# Patient Record
Sex: MALE | Race: WHITE | Employment: FULL TIME | URBAN - METROPOLITAN AREA
[De-identification: names, ages, dates, MRNs, and addresses within clinical notes are randomized per-mention and may not be internally consistent; named-entity substitution may affect disease eponyms.]

---

## 2019-06-12 ENCOUNTER — HOSPITAL ENCOUNTER (INPATIENT)
Age: 64
LOS: 2 days | Discharge: HOME | DRG: 310 | End: 2019-06-14
Attending: EMERGENCY MEDICINE | Admitting: INTERNAL MEDICINE
Payer: COMMERCIAL

## 2019-06-12 ENCOUNTER — APPOINTMENT (OUTPATIENT)
Dept: GENERAL RADIOLOGY | Age: 64
DRG: 310 | End: 2019-06-12
Payer: COMMERCIAL

## 2019-06-12 DIAGNOSIS — I48.91 ATRIAL FIBRILLATION WITH RAPID VENTRICULAR RESPONSE (HCC): Primary | ICD-10-CM

## 2019-06-12 DIAGNOSIS — I10 HYPERTENSION, UNSPECIFIED TYPE: ICD-10-CM

## 2019-06-12 LAB
A/G RATIO: 1.3 (ref 1.1–2.2)
ALBUMIN SERPL-MCNC: 4.1 G/DL (ref 3.4–5)
ALP BLD-CCNC: 66 U/L (ref 40–129)
ALT SERPL-CCNC: 11 U/L (ref 10–40)
ANION GAP SERPL CALCULATED.3IONS-SCNC: 14 MMOL/L (ref 3–16)
APTT: 30.3 SEC (ref 26–36)
AST SERPL-CCNC: 13 U/L (ref 15–37)
BASOPHILS ABSOLUTE: 0.1 K/UL (ref 0–0.2)
BASOPHILS RELATIVE PERCENT: 0.8 %
BILIRUB SERPL-MCNC: 0.7 MG/DL (ref 0–1)
BILIRUBIN URINE: NEGATIVE
BLOOD, URINE: NEGATIVE
BUN BLDV-MCNC: 11 MG/DL (ref 7–20)
CALCIUM SERPL-MCNC: 9.4 MG/DL (ref 8.3–10.6)
CHLORIDE BLD-SCNC: 102 MMOL/L (ref 99–110)
CLARITY: CLEAR
CO2: 23 MMOL/L (ref 21–32)
COLOR: ABNORMAL
CREAT SERPL-MCNC: 0.9 MG/DL (ref 0.8–1.3)
EOSINOPHILS ABSOLUTE: 0.2 K/UL (ref 0–0.6)
EOSINOPHILS RELATIVE PERCENT: 1.5 %
EPITHELIAL CELLS, UA: 0 /HPF (ref 0–5)
GFR AFRICAN AMERICAN: >60
GFR NON-AFRICAN AMERICAN: >60
GLOBULIN: 3.1 G/DL
GLUCOSE BLD-MCNC: 104 MG/DL (ref 70–99)
GLUCOSE URINE: NEGATIVE MG/DL
HCT VFR BLD CALC: 53.5 % (ref 40.5–52.5)
HEMOGLOBIN: 17.8 G/DL (ref 13.5–17.5)
HYALINE CASTS: 1 /LPF (ref 0–8)
INR BLD: 0.94 (ref 0.86–1.14)
KETONES, URINE: NEGATIVE MG/DL
LEUKOCYTE ESTERASE, URINE: NEGATIVE
LYMPHOCYTES ABSOLUTE: 2.3 K/UL (ref 1–5.1)
LYMPHOCYTES RELATIVE PERCENT: 20.9 %
MAGNESIUM: 1.6 MG/DL (ref 1.8–2.4)
MCH RBC QN AUTO: 33.1 PG (ref 26–34)
MCHC RBC AUTO-ENTMCNC: 33.3 G/DL (ref 31–36)
MCV RBC AUTO: 99.3 FL (ref 80–100)
MICROSCOPIC EXAMINATION: YES
MONOCYTES ABSOLUTE: 0.8 K/UL (ref 0–1.3)
MONOCYTES RELATIVE PERCENT: 7.2 %
NEUTROPHILS ABSOLUTE: 7.6 K/UL (ref 1.7–7.7)
NEUTROPHILS RELATIVE PERCENT: 69.6 %
NITRITE, URINE: NEGATIVE
PDW BLD-RTO: 14.3 % (ref 12.4–15.4)
PH UA: 6 (ref 5–8)
PLATELET # BLD: 253 K/UL (ref 135–450)
PMV BLD AUTO: 10.3 FL (ref 5–10.5)
POTASSIUM REFLEX MAGNESIUM: 3.4 MMOL/L (ref 3.5–5.1)
POTASSIUM SERPL-SCNC: 3.4 MMOL/L (ref 3.5–5.1)
PRO-BNP: 1772 PG/ML (ref 0–124)
PROTEIN UA: >=300 MG/DL
PROTHROMBIN TIME: 10.7 SEC (ref 9.8–13)
RBC # BLD: 5.39 M/UL (ref 4.2–5.9)
RBC UA: 2 /HPF (ref 0–4)
REJECTED TEST: NORMAL
SODIUM BLD-SCNC: 139 MMOL/L (ref 136–145)
SPECIFIC GRAVITY UA: 1.02 (ref 1–1.03)
TOTAL PROTEIN: 7.2 G/DL (ref 6.4–8.2)
TROPONIN: <0.01 NG/ML
URINE REFLEX TO CULTURE: ABNORMAL
URINE TYPE: ABNORMAL
UROBILINOGEN, URINE: 1 E.U./DL
WBC # BLD: 10.9 K/UL (ref 4–11)
WBC UA: 1 /HPF (ref 0–5)

## 2019-06-12 PROCEDURE — 71045 X-RAY EXAM CHEST 1 VIEW: CPT

## 2019-06-12 PROCEDURE — 36415 COLL VENOUS BLD VENIPUNCTURE: CPT

## 2019-06-12 PROCEDURE — 85610 PROTHROMBIN TIME: CPT

## 2019-06-12 PROCEDURE — 85025 COMPLETE CBC W/AUTO DIFF WBC: CPT

## 2019-06-12 PROCEDURE — 96376 TX/PRO/DX INJ SAME DRUG ADON: CPT

## 2019-06-12 PROCEDURE — 96366 THER/PROPH/DIAG IV INF ADDON: CPT

## 2019-06-12 PROCEDURE — 6370000000 HC RX 637 (ALT 250 FOR IP): Performed by: INTERNAL MEDICINE

## 2019-06-12 PROCEDURE — 81001 URINALYSIS AUTO W/SCOPE: CPT

## 2019-06-12 PROCEDURE — 94760 N-INVAS EAR/PLS OXIMETRY 1: CPT

## 2019-06-12 PROCEDURE — 83880 ASSAY OF NATRIURETIC PEPTIDE: CPT

## 2019-06-12 PROCEDURE — 2580000003 HC RX 258: Performed by: INTERNAL MEDICINE

## 2019-06-12 PROCEDURE — 99285 EMERGENCY DEPT VISIT HI MDM: CPT

## 2019-06-12 PROCEDURE — 83735 ASSAY OF MAGNESIUM: CPT

## 2019-06-12 PROCEDURE — 93005 ELECTROCARDIOGRAM TRACING: CPT | Performed by: EMERGENCY MEDICINE

## 2019-06-12 PROCEDURE — 96365 THER/PROPH/DIAG IV INF INIT: CPT

## 2019-06-12 PROCEDURE — 2060000000 HC ICU INTERMEDIATE R&B

## 2019-06-12 PROCEDURE — 2580000003 HC RX 258: Performed by: EMERGENCY MEDICINE

## 2019-06-12 PROCEDURE — 84484 ASSAY OF TROPONIN QUANT: CPT

## 2019-06-12 PROCEDURE — 2500000003 HC RX 250 WO HCPCS: Performed by: EMERGENCY MEDICINE

## 2019-06-12 PROCEDURE — 80053 COMPREHEN METABOLIC PANEL: CPT

## 2019-06-12 PROCEDURE — 94150 VITAL CAPACITY TEST: CPT

## 2019-06-12 PROCEDURE — 85730 THROMBOPLASTIN TIME PARTIAL: CPT

## 2019-06-12 PROCEDURE — 6370000000 HC RX 637 (ALT 250 FOR IP): Performed by: EMERGENCY MEDICINE

## 2019-06-12 RX ORDER — HYDROCODONE BITARTRATE AND ACETAMINOPHEN 5; 325 MG/1; MG/1
1 TABLET ORAL EVERY 6 HOURS PRN
Status: DISCONTINUED | OUTPATIENT
Start: 2019-06-12 | End: 2019-06-14 | Stop reason: HOSPADM

## 2019-06-12 RX ORDER — ONDANSETRON 2 MG/ML
4 INJECTION INTRAMUSCULAR; INTRAVENOUS EVERY 6 HOURS PRN
Status: DISCONTINUED | OUTPATIENT
Start: 2019-06-12 | End: 2019-06-14 | Stop reason: HOSPADM

## 2019-06-12 RX ORDER — HYDROCODONE BITARTRATE AND ACETAMINOPHEN 5; 325 MG/1; MG/1
1 TABLET ORAL ONCE
Status: COMPLETED | OUTPATIENT
Start: 2019-06-12 | End: 2019-06-12

## 2019-06-12 RX ORDER — PANTOPRAZOLE SODIUM 40 MG/1
40 TABLET, DELAYED RELEASE ORAL
Status: DISCONTINUED | OUTPATIENT
Start: 2019-06-13 | End: 2019-06-14 | Stop reason: HOSPADM

## 2019-06-12 RX ORDER — DILTIAZEM HYDROCHLORIDE 5 MG/ML
10 INJECTION INTRAVENOUS ONCE
Status: COMPLETED | OUTPATIENT
Start: 2019-06-12 | End: 2019-06-12

## 2019-06-12 RX ORDER — ATENOLOL 50 MG/1
50 TABLET ORAL DAILY
Status: DISCONTINUED | OUTPATIENT
Start: 2019-06-12 | End: 2019-06-14

## 2019-06-12 RX ORDER — SODIUM CHLORIDE 0.9 % (FLUSH) 0.9 %
10 SYRINGE (ML) INJECTION EVERY 12 HOURS SCHEDULED
Status: DISCONTINUED | OUTPATIENT
Start: 2019-06-12 | End: 2019-06-14 | Stop reason: HOSPADM

## 2019-06-12 RX ORDER — HYDROCHLOROTHIAZIDE 25 MG/1
25 TABLET ORAL DAILY
Status: DISCONTINUED | OUTPATIENT
Start: 2019-06-12 | End: 2019-06-14 | Stop reason: HOSPADM

## 2019-06-12 RX ORDER — LISINOPRIL 20 MG/1
20 TABLET ORAL ONCE
Status: COMPLETED | OUTPATIENT
Start: 2019-06-12 | End: 2019-06-12

## 2019-06-12 RX ORDER — SODIUM CHLORIDE 0.9 % (FLUSH) 0.9 %
10 SYRINGE (ML) INJECTION PRN
Status: DISCONTINUED | OUTPATIENT
Start: 2019-06-12 | End: 2019-06-14 | Stop reason: HOSPADM

## 2019-06-12 RX ORDER — FLUTICASONE PROPIONATE 50 MCG
1 SPRAY, SUSPENSION (ML) NASAL DAILY PRN
Status: DISCONTINUED | OUTPATIENT
Start: 2019-06-12 | End: 2019-06-14 | Stop reason: HOSPADM

## 2019-06-12 RX ADMIN — APIXABAN 5 MG: 5 TABLET, FILM COATED ORAL at 18:25

## 2019-06-12 RX ADMIN — Medication 10 ML: at 20:27

## 2019-06-12 RX ADMIN — ATENOLOL 50 MG: 50 TABLET ORAL at 18:21

## 2019-06-12 RX ADMIN — HYDROCODONE BITARTRATE AND ACETAMINOPHEN 1 TABLET: 5; 325 TABLET ORAL at 13:11

## 2019-06-12 RX ADMIN — DILTIAZEM HYDROCHLORIDE 5 MG/HR: 5 INJECTION INTRAVENOUS at 12:39

## 2019-06-12 RX ADMIN — MOMETASONE FUROATE AND FORMOTEROL FUMARATE DIHYDRATE 2 PUFF: 100; 5 AEROSOL RESPIRATORY (INHALATION) at 21:25

## 2019-06-12 RX ADMIN — HYDROCHLOROTHIAZIDE 25 MG: 25 TABLET ORAL at 18:20

## 2019-06-12 RX ADMIN — DILTIAZEM HYDROCHLORIDE 10 MG: 5 INJECTION INTRAVENOUS at 12:23

## 2019-06-12 RX ADMIN — HYDROCODONE BITARTRATE AND ACETAMINOPHEN 1 TABLET: 5; 325 TABLET ORAL at 18:22

## 2019-06-12 RX ADMIN — LISINOPRIL 20 MG: 20 TABLET ORAL at 21:21

## 2019-06-12 ASSESSMENT — PAIN SCALES - GENERAL
PAINLEVEL_OUTOF10: 7
PAINLEVEL_OUTOF10: 2
PAINLEVEL_OUTOF10: 7
PAINLEVEL_OUTOF10: 3
PAINLEVEL_OUTOF10: 4
PAINLEVEL_OUTOF10: 4
PAINLEVEL_OUTOF10: 3

## 2019-06-12 ASSESSMENT — PAIN DESCRIPTION - ONSET: ONSET: ON-GOING

## 2019-06-12 ASSESSMENT — PAIN DESCRIPTION - FREQUENCY: FREQUENCY: INTERMITTENT

## 2019-06-12 ASSESSMENT — PAIN DESCRIPTION - DESCRIPTORS: DESCRIPTORS: ACHING

## 2019-06-12 ASSESSMENT — PAIN - FUNCTIONAL ASSESSMENT: PAIN_FUNCTIONAL_ASSESSMENT: PREVENTS OR INTERFERES SOME ACTIVE ACTIVITIES AND ADLS

## 2019-06-12 ASSESSMENT — PAIN DESCRIPTION - PROGRESSION: CLINICAL_PROGRESSION: NOT CHANGED

## 2019-06-12 ASSESSMENT — PAIN DESCRIPTION - ORIENTATION: ORIENTATION: ANTERIOR

## 2019-06-12 ASSESSMENT — PAIN DESCRIPTION - LOCATION: LOCATION: HEAD

## 2019-06-12 ASSESSMENT — PAIN DESCRIPTION - PAIN TYPE: TYPE: ACUTE PAIN

## 2019-06-12 NOTE — ED NOTES
Return phone call received from cardiology. Order obtained per Dr. Gerardo Mauricio to stop Cardiazem gtt and stated that someone will be in to see pt tomorrow.       Kimber Titus RN  06/12/19 1660

## 2019-06-12 NOTE — ED TRIAGE NOTES
Pt sent to ED by his PCP Dr. Cristina Muro d/t high b/p, weakness, lethargy and dizziness that started Friday. Pt states he went to PCP today to get lab work done today, states Dr. Cristina Muro wanted to r/o leukemia, and Dr. Cristina Muro sent him to the ED d/t his b/p. Pt states Dr. Cristina Muro gave him b/p medication while in her office but does not know what the name of the medication was.

## 2019-06-12 NOTE — ED PROVIDER NOTES
[facility]  eMERGENCY dEPARTMENT eNCOUnter      Pt Name: Miya Sanabria  MRN: [id number]  [place] 1955  Date of evaluation: 6/12/2019  Provider: Demetri Rae DO    CHIEF COMPLAINT       Chief Complaint   Patient presents with    Hypertension     Pt sent to ED by his PCP Dr. Sumi Ko d/t high b/p, weakness, lethargy and dizziness that started Friday.  Fatigue         HISTORY OF PRESENT ILLNESS   (Location/Symptom, Timing/Onset, Context/Setting, Quality, Duration, Modifying Factors, Severity)  Note limiting factors. Miya Sanabria is a 62 y.o. male who presents to the emergency department with complaint of fatigue, lightheadedness for the last 2 to 3 days. He went to see his primary care physician Dr. Sumi Ko today in the office and was noted to have elevated blood pressure and was sent to the hospital to be admitted. He reports that his blood tests in the past have shown an elevated white blood cell count of 18 and he was to have this checked today. He is known to have a history of hypertension. He reports that his medication was changed to a generic medication approximately 6 months ago but he has not been taking his medication. He denies any chest pain heaviness pressure tightness. He denies any syncope. He denies any palpitations. He does have some mild shortness of breath which is consistent with his COPD but is unchanged from baseline. He denies any dyspnea on exertion. He denies any focal weakness or numbness. He denies any vomiting or diarrhea. No melena or hematochezia. No abdominal pain. No headache. HPI    Nursing Notes were reviewed. REVIEW OF SYSTEMS    (2-9 systems for level 4, 10 or more for level 5)       Constitutional: Negative for fever or chills. HENT: Negative for rhinorrhea and sore throat. Eyes: Negative for redness or drainage. Respiratory: Negative for shortness of breath or dyspnea on exertion. (HYDRODIURIL) 25 MG TABLET    Take 1 tablet by mouth daily    HYDROCODONE-ACETAMINOPHEN (NORCO) 5-325 MG PER TABLET    Take 1 tablet by mouth every 8 hours as needed for Pain for up to 30 days. LOSARTAN (COZAAR) 100 MG TABLET    Take 1 tablet by mouth daily    OMEPRAZOLE (PRILOSEC) 40 MG DELAYED RELEASE CAPSULE    TAKE 1 CAPSULE BY MOUTH  DAILY       ALLERGIES     Lodine [etodolac]; Neurontin [gabapentin]; Pregabalin; Vioxx; and Wellbutrin [bupropion hcl]    FAMILY HISTORY       Family History   Problem Relation Age of Onset    Arthritis Father     Other Father         GERD    Heart Disease Father         had heart attack          SOCIAL HISTORY       Social History     Socioeconomic History    Marital status:      Spouse name: Viviane Montague Number of children: 2    Years of education: None    Highest education level: None   Occupational History    Occupation: Maintenance     Employer: [employer]   Social Needs    Financial resource strain: None    Food insecurity:     Worry: None     Inability: None    Transportation needs:     Medical: None     Non-medical: None   Tobacco Use    Smoking status: Current Every Day Smoker     Packs/day: 2.00     Years: 50.00     Pack years: 100.00     Types: Cigarettes    Smokeless tobacco: Never Used    Tobacco comment: pt has tried to quit in the past to quit with use of chantix and didn't work. has tried other means of quitting and has been unsuccsessful.    Substance and Sexual Activity    Alcohol use: No     Comment: no longer drinks    Drug use: Yes     Types: Marijuana     Comment: daily use, \"like a cocktail\"    Sexual activity: Yes     Partners: Female   Lifestyle    Physical activity:     Days per week: None     Minutes per session: None    Stress: None   Relationships    Social connections:     Talks on phone: None     Gets together: None     Attends Jewish service: None     Active member of club or organization: None     Attends meetings of clubs or organizations: None     Relationship status: None    Intimate partner violence:     Fear of current or ex partner: None     Emotionally abused: None     Physically abused: None     Forced sexual activity: None   Other Topics Concern    None   Social History Narrative    None       SCREENINGS             PHYSICAL EXAM    (up to 7 for level 4, 8 or more for level 5)     ED Triage Vitals [06/12/19 1106]   BP Temp Temp Source Pulse Resp SpO2 Height Weight   (!) 163/103 97.9 °F (36.6 °C) Oral 62 17 98 % 5' 8\" (1.727 m) 183 lb 6.8 oz (83.2 kg)       Physical Exam   Constitutional: Awake and alert and oriented to person place and time. No apparent distress. Head: No visible evidence of trauma. Normocephalic. Eyes: Pupils equal and reactive. No photophobia. Conjunctiva normal.    HENT: Oral mucosa moist.  Airway patent. Neck:  Soft and supple. Heart: Rapid and irregular. No murmur. Occasional PVCs on the monitor. Atrial fibrillation on the monitor. Lungs:  Clear to auscultation. No wheezes, rales, or ronchi. No conversational dyspnea or accessory muscle use. Chest: Chest wall non-tender. No evidence of trauma. Abdomen:  Soft, nondistended, bowel sounds present. Nontender. No guarding rigidity or rebound. No masses. Musculoskeletal: Extremities non-tender with full range of motion. Radial and dorsalis pedis pulses are equal bilaterally. No calf tenderness erythema or edema. Neurological: Alert and oriented x 3. Speech clear. Cranial nerves II-XII intact. No facial droop. No acute focal motor or sensory deficits. Skin: Skin is warm and dry. No rash. Lymphatic:  No lympadenopathy. Psychiatric: Normal mood and affect. Behavior is normal.         DIAGNOSTIC RESULTS     EKG: All EKG's are interpreted by the Emergency Department Physician who either signs or Co-signs this chart in the absence of a cardiologist.    Atrial fibrillation with rapid ventricular response.   Rate 122.  QRS duration 98 ms. QTc 513 ms. R axis -30 7 degrees. There is no ST elevation. Occasional PVCs were noted. RADIOLOGY:   Non-plain film images such as CT, Ultrasound and MRI are read by the radiologist. Plain radiographic images are visualized and preliminarily interpreted by the emergency physician with the below findings:        Interpretation per the Radiologist below, if available at the time of this note:    XR CHEST PORTABLE   Final Result   Borderline cardiomegaly. No evidence for acute cardiopulmonary pathology.                ED BEDSIDE ULTRASOUND:   Performed by ED Physician - none    LABS:  Labs Reviewed   COMPREHENSIVE METABOLIC PANEL W/ REFLEX TO MG FOR LOW K - Abnormal; Notable for the following components:       Result Value    Glucose 104 (*)     AST 13 (*)     All other components within normal limits    Narrative:     Performed at:  [address] [place] [address]   Phone [phone]   URINE RT REFLEX TO CULTURE - Abnormal; Notable for the following components:    Protein, UA >=300 (*)     All other components within normal limits    Narrative:     Performed at:  [address] [place] [address]   Phone [phone]   BRAIN NATRIURETIC PEPTIDE - Abnormal; Notable for the following components:    Pro-BNP 1,772 (*)     All other components within normal limits    Narrative:     Performed at:  [address] [place] [address]   Phone [phone]   CBC WITH AUTO DIFFERENTIAL - Abnormal; Notable for the following components:    Hemoglobin 17.8 (*)     Hematocrit 53.5 (*)     All other components within normal limits    Narrative:     Performed at:  [address] [place] [address]   Phone [phone]   BASIC METABOLIC PANEL - Abnormal; Notable for the following components:    Potassium 3.4 (*)     All other components within normal limits    Narrative:     Performed at:  [facility]  [address] [facility] [place] [address]   Phone [phone]   TROPONIN    Narrative:     Performed at:  [facility]  [address] [facility] [place] [address]   Phone [phone]   PROTIME-INR    Narrative:     Performed at:  [facility]  [address] [facility] [place] [address]   Phone [phone]   APTT    Narrative:     Performed at:  [facility]  [address] [facility] [place] [address]   Phone [phone]   MICROSCOPIC URINALYSIS    Narrative:     Performed at:  [facility]  [address] [facility] [place] [address]   Phone [phone]   SPECIMEN REJECTION    Narrative:     Performed at:  [facility]  [address] [facility] [place] [address]   Phone [phone]       All other labs were within normal range or not returned as of this dictation. EMERGENCY DEPARTMENT COURSE and DIFFERENTIAL DIAGNOSIS/MDM:   Vitals:    Vitals:    06/12/19 1326 06/12/19 1347 06/12/19 1402 06/12/19 1417   BP:  (!) 155/97 (!) 129/95 (!) 147/93   Pulse: 81 68 74 80   Resp: 16 16 13 15   Temp:       TempSrc:       SpO2: 95% 98% 94% 96%   Weight:       Height:           Patient presented with fatigue, lethargy, no energy, and increased blood pressure noted at the physician's office. Patient is noted to have atrial fibrillation with rapid ventricular response. Heart rate is 122 on the monitor. It does appear that there occasionally some periods where he converts briefly to sinus rhythm but returns to a rapid atrial fib. There is also some occasional ectopy with PVCs. No associated chest pain. No evidence of hypoxia.   He was given a bolus of Cardizem 10 mg IV followed by Cardizem drip at 10 mg/h.  We will titrate to a decreased heart rate of less than 184 and systolic blood pressure of less than 150. MDM      REASSESSMENT          The patient was reexamined at the time of disposition. Heart rate is much better. Rate is in the 90s. He still has atrial fibrillation with controlled ventricular response. He has had some intermittent periods where it appears that he is trying to convert to sinus rhythm but overall is still in atrial fib. Blood pressure is much better controlled. He will be admitted for further treatment and evaluation. A call was placed to Dr. Sheba Morrell, his primary care physician. She agrees to admit the patient and is in agreement with the treatment plan. CRITICAL CARE TIME   Total Critical Care time was 30 minutes, excluding separately reportable procedures. There was a high probability of clinically significant/life threatening deterioration in the patient's condition which required my urgent intervention. CONSULTS:  IP CONSULT TO PRIMARY CARE PROVIDER    PROCEDURES:  Unless otherwise noted below, none     Procedures        FINAL IMPRESSION      1. Atrial fibrillation with rapid ventricular response ([address]    2. Hypertension, unspecified type          DISPOSITION/PLAN   DISPOSITION        PATIENT REFERRED TO:  No follow-up provider specified. DISCHARGE MEDICATIONS:  New Prescriptions    No medications on file     Controlled Substances Monitoring:     RX Monitoring 5/22/2019   Attestation The Prescription Monitoring Report for this patient was reviewed today. Periodic Controlled Substance Monitoring Possible medication side effects, risk of tolerance/dependence & alternative treatments discussed. Chronic Pain > 80 MEDD -       (Please note that portions of this note were completed with a voice recognition program.  Efforts were made to edit the dictations but occasionally words are mis-transcribed. )    Nelsy Chin DO (electronically signed)  Attending Emergency Physician          Rufino Duff DO  06/12/19 8289

## 2019-06-12 NOTE — ED NOTES
Pharmacy Medication Reconciliation Note     List of medications patient is currently taking is complete. Allergies:  Lodine [etodolac]; Neurontin [gabapentin]; Pregabalin; Vioxx; and Wellbutrin [bupropion hcl]    Source of information:   1. EMR  2. patient    Notes regarding home medications:   1. Reports he took his AM medications today  2. States one of his BP medications was recalled, but he states he just stopped taking \"all of them. \"  3. Currently has a Trelegy sample inhaler he is using in place of Symbicort.      Denies taking any other OTC or herbal medications    Regan Montoya PharmD, BCPS  6/12/2019  4:24 PM

## 2019-06-12 NOTE — ED NOTES
ED SBAR report provider to [facility]. Patient to be transported to Room 5120 via stretcher by transport tech. Patient transported with bedside cardiac monitor and with IV medications infusing. IV site clean, dry, and intact. MEWS score and pain assessed and documented. Updated patient on plan of care.      Emilie Medina RN  06/12/19 9949

## 2019-06-12 NOTE — ED NOTES
Pt requesting pain medication for his chronic back pain. States he takes Norco 5/325mg at home prn. Dr. Yon Higgins made aware, new orders placed.       Og Evans RN  06/12/19 6627

## 2019-06-13 ENCOUNTER — APPOINTMENT (OUTPATIENT)
Dept: CT IMAGING | Age: 64
DRG: 310 | End: 2019-06-13
Payer: COMMERCIAL

## 2019-06-13 LAB
BASOPHILS ABSOLUTE: 0.1 K/UL (ref 0–0.2)
BASOPHILS RELATIVE PERCENT: 0.9 %
EKG ATRIAL RATE: 53 BPM
EKG DIAGNOSIS: NORMAL
EKG DIAGNOSIS: NORMAL
EKG P AXIS: 34 DEGREES
EKG P-R INTERVAL: 134 MS
EKG Q-T INTERVAL: 360 MS
EKG Q-T INTERVAL: 444 MS
EKG QRS DURATION: 84 MS
EKG QRS DURATION: 98 MS
EKG QTC CALCULATION (BAZETT): 416 MS
EKG QTC CALCULATION (BAZETT): 513 MS
EKG R AXIS: -25 DEGREES
EKG R AXIS: -37 DEGREES
EKG T AXIS: 19 DEGREES
EKG T AXIS: 5 DEGREES
EKG VENTRICULAR RATE: 122 BPM
EKG VENTRICULAR RATE: 53 BPM
EOSINOPHILS ABSOLUTE: 0.2 K/UL (ref 0–0.6)
EOSINOPHILS RELATIVE PERCENT: 2.1 %
HCT VFR BLD CALC: 52.8 % (ref 40.5–52.5)
HEMOGLOBIN: 18 G/DL (ref 13.5–17.5)
LYMPHOCYTES ABSOLUTE: 2.3 K/UL (ref 1–5.1)
LYMPHOCYTES RELATIVE PERCENT: 21.3 %
MCH RBC QN AUTO: 34 PG (ref 26–34)
MCHC RBC AUTO-ENTMCNC: 34.1 G/DL (ref 31–36)
MCV RBC AUTO: 99.6 FL (ref 80–100)
MONOCYTES ABSOLUTE: 0.8 K/UL (ref 0–1.3)
MONOCYTES RELATIVE PERCENT: 7.2 %
NEUTROPHILS ABSOLUTE: 7.5 K/UL (ref 1.7–7.7)
NEUTROPHILS RELATIVE PERCENT: 68.5 %
PDW BLD-RTO: 13.9 % (ref 12.4–15.4)
PLATELET # BLD: 240 K/UL (ref 135–450)
PMV BLD AUTO: 10.3 FL (ref 5–10.5)
RBC # BLD: 5.31 M/UL (ref 4.2–5.9)
WBC # BLD: 10.9 K/UL (ref 4–11)

## 2019-06-13 PROCEDURE — 2580000003 HC RX 258: Performed by: INTERNAL MEDICINE

## 2019-06-13 PROCEDURE — 94640 AIRWAY INHALATION TREATMENT: CPT

## 2019-06-13 PROCEDURE — 2060000000 HC ICU INTERMEDIATE R&B

## 2019-06-13 PROCEDURE — 94761 N-INVAS EAR/PLS OXIMETRY MLT: CPT

## 2019-06-13 PROCEDURE — 6370000000 HC RX 637 (ALT 250 FOR IP): Performed by: INTERNAL MEDICINE

## 2019-06-13 PROCEDURE — 85025 COMPLETE CBC W/AUTO DIFF WBC: CPT

## 2019-06-13 PROCEDURE — 99223 1ST HOSP IP/OBS HIGH 75: CPT | Performed by: INTERNAL MEDICINE

## 2019-06-13 PROCEDURE — 36415 COLL VENOUS BLD VENIPUNCTURE: CPT

## 2019-06-13 PROCEDURE — 93010 ELECTROCARDIOGRAM REPORT: CPT | Performed by: INTERNAL MEDICINE

## 2019-06-13 PROCEDURE — 71250 CT THORAX DX C-: CPT

## 2019-06-13 RX ORDER — NICOTINE 21 MG/24HR
1 PATCH, TRANSDERMAL 24 HOURS TRANSDERMAL DAILY
Status: DISCONTINUED | OUTPATIENT
Start: 2019-06-13 | End: 2019-06-14 | Stop reason: HOSPADM

## 2019-06-13 RX ADMIN — Medication 10 ML: at 21:43

## 2019-06-13 RX ADMIN — APIXABAN 5 MG: 5 TABLET, FILM COATED ORAL at 08:17

## 2019-06-13 RX ADMIN — MOMETASONE FUROATE AND FORMOTEROL FUMARATE DIHYDRATE 2 PUFF: 100; 5 AEROSOL RESPIRATORY (INHALATION) at 21:28

## 2019-06-13 RX ADMIN — HYDROCODONE BITARTRATE AND ACETAMINOPHEN 1 TABLET: 5; 325 TABLET ORAL at 10:45

## 2019-06-13 RX ADMIN — HYDROCHLOROTHIAZIDE 25 MG: 25 TABLET ORAL at 08:17

## 2019-06-13 RX ADMIN — ATENOLOL 50 MG: 50 TABLET ORAL at 08:18

## 2019-06-13 RX ADMIN — PANTOPRAZOLE SODIUM 40 MG: 40 TABLET, DELAYED RELEASE ORAL at 07:44

## 2019-06-13 RX ADMIN — Medication 10 ML: at 08:18

## 2019-06-13 RX ADMIN — MOMETASONE FUROATE AND FORMOTEROL FUMARATE DIHYDRATE 2 PUFF: 100; 5 AEROSOL RESPIRATORY (INHALATION) at 08:36

## 2019-06-13 RX ADMIN — APIXABAN 5 MG: 5 TABLET, FILM COATED ORAL at 21:43

## 2019-06-13 ASSESSMENT — PAIN SCALES - WONG BAKER
WONGBAKER_NUMERICALRESPONSE: 0
WONGBAKER_NUMERICALRESPONSE: 0

## 2019-06-13 ASSESSMENT — PAIN - FUNCTIONAL ASSESSMENT: PAIN_FUNCTIONAL_ASSESSMENT: ACTIVITIES ARE NOT PREVENTED

## 2019-06-13 ASSESSMENT — PAIN DESCRIPTION - LOCATION: LOCATION: BACK

## 2019-06-13 ASSESSMENT — PAIN SCALES - GENERAL
PAINLEVEL_OUTOF10: 0
PAINLEVEL_OUTOF10: 0
PAINLEVEL_OUTOF10: 7

## 2019-06-13 ASSESSMENT — PAIN DESCRIPTION - PAIN TYPE: TYPE: CHRONIC PAIN

## 2019-06-13 ASSESSMENT — PAIN DESCRIPTION - DESCRIPTORS: DESCRIPTORS: ACHING

## 2019-06-13 ASSESSMENT — PAIN DESCRIPTION - PROGRESSION: CLINICAL_PROGRESSION: GRADUALLY WORSENING

## 2019-06-13 ASSESSMENT — PAIN DESCRIPTION - FREQUENCY: FREQUENCY: INTERMITTENT

## 2019-06-13 ASSESSMENT — PAIN DESCRIPTION - ONSET: ONSET: GRADUAL

## 2019-06-13 ASSESSMENT — PAIN DESCRIPTION - ORIENTATION: ORIENTATION: MID;LOWER

## 2019-06-13 NOTE — PLAN OF CARE
Problem: Pain:  Goal: Pain level will decrease  Description  Pain level will decrease  Outcome: Ongoing  Goal: Control of acute pain  Description  Control of acute pain  Outcome: Ongoing  Goal: Control of chronic pain  Description  Control of chronic pain  Outcome: Ongoing     Problem:  Activity:  Goal: Ability to tolerate increased activity will improve  Description  Ability to tolerate increased activity will improve  Outcome: Ongoing  Goal: Expression of feelings of increased energy will increase  Description  Expression of feelings of increased energy will increase  Outcome: Ongoing     Problem: Cardiac:  Goal: Ability to maintain an adequate cardiac output will improve  Description  Ability to maintain an adequate cardiac output will improve  Outcome: Ongoing  Goal: Complications related to the disease process, condition or treatment will be avoided or minimized  Description  Complications related to the disease process, condition or treatment will be avoided or minimized  Outcome: Ongoing  Goal: Ability to maintain vital signs within normal range will improve  Description  Ability to maintain vital signs within normal range will improve  Outcome: Ongoing  Goal: Cardiovascular alteration will improve  Description  Cardiovascular alteration will improve  Outcome: Ongoing     Problem: Coping:  Goal: Level of anxiety will decrease  Description  Level of anxiety will decrease  Outcome: Ongoing  Goal: General experience of comfort will improve  Description  General experience of comfort will improve  Outcome: Ongoing     Problem: Health Behavior:  Goal: Ability to manage health-related needs will improve  Description  Ability to manage health-related needs will improve  Outcome: Ongoing  Goal: Will modify at least one risk factor affecting health status  Description  Will modify at least one risk factor affecting health status  Outcome: Ongoing  Goal: Identification of resources available to assist in meeting health care needs will improve  Description  Identification of resources available to assist in meeting health care needs will improve  Outcome: Ongoing     Problem: Safety:  Goal: Ability to remain free from injury will improve  Description  Ability to remain free from injury will improve  Outcome: Ongoing  Goal: Will show no signs and symptoms of excessive bleeding  Description  Will show no signs and symptoms of excessive bleeding  Outcome: Ongoing     Problem: Physical Regulation:  Goal: Complications related to the disease process, condition or treatment will be avoided or minimized  Description  Complications related to the disease process, condition or treatment will be avoided or minimized  Outcome: Ongoing   Patient has converted to NSR and remains in NSR at this time. Pain/discomfort being managed with PRN analgesics per MD orders. Pt able to express presence and absence of pain and rate pain appropriately using numerical scale. Patient's ability assessed to perform self care and independent activity encouraged according to that ability. Assisted with ADL's as needed. Risk for skin breakdown assessed. Potential discharge needs assessed. Patient and family included in daily care decisions. Fall risk precautions in place. Bed in lowest position with wheels locked,bed alarm in place and activated,non-skid socks on pt, fall risk ID on pt, call light in reach, will continue to monitor.

## 2019-06-13 NOTE — CARE COORDINATION
INITIAL CASE MANAGEMENT ASSESSMENT    Reviewed chart, met with patient to assess possible discharge needs. Explained Case Management role/services. Living Situation:  Lives in own home with wife . ADLs:  Independent with all aspect of care. DME:  N/A     PT/OT Recs:  Not ordered     Active Services:  N/A      Transportation: Self per care or wife      Medications:  No issues getting , taking or affording medication. PCP:  Dr. Hassan Copping       HD/PD:  N/A     PLAN/COMMENTS:  Plans to return home with wife with no anticipated needs. SW/CM provided contact information for patient or family to call with any questions. SW/CM will follow and assist as needed.

## 2019-06-13 NOTE — H&P
Hospital Medicine History & Physical    Patient:  Zelda Moser  YOB: 1955  Date of Service: 6/13/19  MRN: [id number]    PCP: Kell Devries MD    Date of Admission: 6/12/2019      Chief Complaint:    Chief Complaint   Patient presents with    Hypertension     Pt sent to ED by his PCP Dr. Cisco Nyhan d/t high b/p, weakness, lethargy and dizziness that started Friday.  Fatigue         History Of Present Illness: The patient is a 62 y.o. male who presents to [facility] with uncontrolled HTN  Stopped bp meds  Has dizzyness, headaches and not feeling well  Looks weak  Feels unsteady  meds are not helping  In er he was diagnosed with afib  On cardizem drip  Now is better    Past Medical History:        Diagnosis Date    Chronic pain syndrome     Chronic pain syndrome     COPD (chronic obstructive pulmonary disease) ([facility] [address] 6/5/2012    DDD (degenerative disc disease), lumbar     Depression 6/5/2012    Diverticulitis 6/5/2012    DJD (degenerative joint disease) of knee     Foraminal stenosis of lumbar region     LORIE (generalized anxiety disorder) 6/5/2012    GERD (gastroesophageal reflux disease) 6/5/2012    HTN (hypertension) 6/5/2012    Incidental lung nodule     Insomnia     Lumbar radiculitis     Neuropathy 6/5/2012    Nontraumatic rupture of tendons of biceps (long head) 6/5/2012    Numbness and tingling of both legs     Pernicious anemia 6/5/2012    Sciatica     Sprain and strain of unspecified site of elbow and forearm 6/5/2012       Past Surgical History:        Procedure Laterality Date    MUSCLE REPAIR Bilateral 8-10 years ago    Pulled bilateral biceps off bone from lifting weights.        Family History:  Family History   Problem Relation Age of Onset    Arthritis Father     Other Father         GERD    Heart Disease Father         had heart attack hearing difficulty, no tinnitus  Mouth/throat: no ulceration, dental caries, dysphagia  Lungs: wheeze  CVS: no palpitation, no chest pain,  shortness of breath  GI: no abdominal pain, no nausea , no vomiting, no constipation  ANTONIO: no dysuria, frequency and urgency, no hematuria, no kidney stones  Musculoskeletal:back pain  Endocrine: no polyuria, polydypsia, no cold or heat intolerence  Hematology: no anemia, no easy brusing or bleeding, no hx of clotting disorder  Dermatology: no skin rash, no eczema, no prurities,  Psychiatry: no depression, no anxiety,no panic attacks, no suicide ideation  Neurology: no syncope, no seizures, no numbness or tingling of hands, no numbness or tingling of feet, no paresis      PHYSICAL EXAM:  General:  Awake, alert, not in distress. Appears to be stated age. HEENT: Atraumatic, normocephalic. PERRLA. EOM intact. Pink conjunctiva, anicteric sclera. Pink and moist oral mucosa. No lymphadenopathy. Trachea midline. Thyroid non palpable. Neck supple. No carotid bruit. No JVD. Chest: Bilateal air entry, Clear to auscultation, no wheezing, rhonchi or rales. Cardiovascular: RRR, U2J6, no murmur, click, rub or gallop. No lower extremity edema. Pedal and posterior tibialis 2+. Abdomen: Soft, non tender to palpation. Active bowel sounds x 4 quadrants. Musculoskeletal: Limitation of the rom of the joints and LSS  SLR is positive on the affected side  No gross weak ness appreciated  CNS: Oriented to person, place and time. CXR:   XR CHEST PORTABLE   Final Result   Borderline cardiomegaly. No evidence for acute cardiopulmonary pathology. EKG:  I have reviewed the EKG.     CBC   Recent Labs     06/12/19  1249 06/13/19  0531   WBC 10.9 10.9   HGB 17.8* 18.0*   HCT 53.5* 52.8*    240      RENAL  Recent Labs     06/12/19  1249      K 3.4*  3.4*      CO2 23   BUN 11   CREATININE 0.9     LFT'S  Recent Labs     06/12/19  1249   AST 13*   ALT 11   BILITOT 0.7 ALKPHOS 66     COAG  Recent Labs     06/12/19  1249   INR 0.94     CARDIAC ENZYMES  Recent Labs     06/12/19  1249   TROPONINI <0.01       U/A:    Lab Results   Component Value Date    COLORU DK YELLOW 06/12/2019    WBCUA 1 06/12/2019    RBCUA 2 06/12/2019    MUCUS Trace 03/07/2011    CLARITYU Clear 06/12/2019    SPECGRAV 1.021 06/12/2019    LEUKOCYTESUR Negative 06/12/2019    BLOODU Negative 06/12/2019    GLUCOSEU Negative 06/12/2019    GLUCOSEU NEGATIVE 01/20/2012       ABG  No results found for: AJS8RSA, BEART, L8GJGDDG, PHART, THGBART, TBM6LYC, PO2ART, TLB4JHN        Active Hospital Problems    Diagnosis Date Noted   [facility] [I48.91] 06/12/2019           ASSESSMENT/PLAN:  Paroxysmal Afib-to be seen by the cardiology  Uncontrolled HTN=monitor bp  Erythrocytosis=oncology consult  Smoking cessation- counselled   Pt is stable. Discussed with staff and pt about the plan. See the orders for further plan. Will proceed further acc to pt's progress.     Electronically signed by Charlie Damian MD on 6/13/2019 at 12:00 PM

## 2019-06-13 NOTE — CONSULTS
[address] [place] [address]                                  CONSULTATION    PATIENT NAME: Tamera Williamson                :        1955  MED REC NO:   [id number]                          ROOM:       [id number]  ACCOUNT NO:   [de-identified]                           ADMIT DATE: 2019  PROVIDER:     Cristina Mauro MD    CONSULT DATE:  2019    REASON FOR CONSULTATION:  Polycythemia. CONSULTING PHYSICIAN:  Nohemy Mcdonald MD    HISTORY OF PRESENT ILLNESS:  The patient is a 71-year-old gentleman with  a history of COPD and hypertension who presented to the hospital with  severe hypertension and dizziness and dyspnea on exertion. His blood  pressure was greater than 160/100 and he was sent to the ER. His blood  pressures have been brought down and he does feel better. His  hemoglobin and hematocrit are elevated at 18 and 52.8 respectively. Oncology was then consulted for further workup and management. He has  had an intermittently high hemoglobin and hematocrit in the past as well  as an intermittent elevated white blood cell count. PAST MEDICAL HISTORY:  1. Hypertension. 2.  COPD. 3.  AFib. ALLERGIES:  He has no known drug allergies. MEDICINES:  1.  Eliquis 5 mg p.o. b.i.d.  2.  Atenolol 50 mg p.o. daily. 3.  Diltiazem. 4.  Hydrochlorothiazide 25 mg p.o. daily. 5.  Lisinopril 20 mg p.o. daily. SOCIAL HISTORY:  He is . He smokes two packs per day and has  done so for 40 to 50 years. He drinks occasionally. He is retired. FAMILY HISTORY:  Noncontributory.     REVIEW OF SYSTEMS:  He denies any recent fever, chills, sweats, nausea,  vomiting, abdominal pain, chest pain, shortness of breath, headaches,  any new bone aches, dysphagia, odynophagia, diarrhea, constipation,  hemoptysis, hematemesis, change in vision/hearing/smell/taste, weakness,  neuropathy, skin rashes, productive cough, urinary or bowel prolapse or  incontinence, pruritus, hallucinations, nasal congestion or drainage,  depression, anxiety, suicidal ideations, melena, or hematochezia. He  has mild to moderate fatigue and mild to moderate dyspnea on exertion. His 10-system review of systems is otherwise negative. PHYSICAL EXAMINATION:  VITAL SIGNS:  He is afebrile with normal vital signs. GENERAL:  He is in no acute distress. HEENT:  His pupils are round and reactive to light and accommodation. Extraocular muscles are intact. NECK:  He has no jugular venous distention. No thyromegaly. His  oropharynx is clear. He has no carotid bruits. He has no palpable  lymphadenopathy. HEART:  Regular rate and rhythm. LUNGS:  Clear to auscultation bilaterally. ABDOMEN:  Nondistended, nontender with bowel sounds x4. No  hepatosplenomegaly. EXTREMITIES:  He has no peripheral clubbing, cyanosis, or edema. NEUROLOGIC:  Exam is nonfocal.    LABORATORY DATA:  His white blood cell count is 10.9, hemoglobin 18,  platelets of 295. ASSESSMENT AND PLAN:  Erythrocytosis. I will go ahead and send a JAK2  mutation to help rule in or rule out polycythemia vera, which is  unlikely. It is greater than 90% _____ specific for that disorder. I  will also check an erythropoietin level which is usually low in  polycythemia vera and elevated in secondary polycythemia as well as  erythropoietin-secreting tumors. If he does have polycythemia vera, I  will initiate a phlebotomy program with a goal hematocrit of less than  45. I went into great detail about the natural history, prognosis, and  treatment options for polycythemia vera in case he has it. 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 For  reasons that we do not understands, patients with secondary polycythemia  are not at any increased risk of strokes, heart attacks, blood clots and  do not need to be treated.   It will take one to two weeks for this blood  work to come back and he should see me as an outpatient. I have  stressed the importance of compliance with followups. I will get a  baseline chest CT for his lung cancer screening, especially since he has  dyspnea on exertion. He needs this yearly. He will try to cut down on  smoking. He declined any pharmacologic help from me. Thank you for the consultation. I will follow closely.         Bonnie Martines MD    D: 06/13/2019 16:27:40       T: 06/13/2019 17:25:05     [email or web address]  Job#: [id number]     Doc#: [id number]    CC:  Justin Holliday MD

## 2019-06-14 VITALS
OXYGEN SATURATION: 95 % | SYSTOLIC BLOOD PRESSURE: 148 MMHG | RESPIRATION RATE: 16 BRPM | DIASTOLIC BLOOD PRESSURE: 89 MMHG | BODY MASS INDEX: 27.33 KG/M2 | HEART RATE: 61 BPM | TEMPERATURE: 97.7 F | HEIGHT: 68 IN | WEIGHT: 180.34 LBS

## 2019-06-14 LAB
ANION GAP SERPL CALCULATED.3IONS-SCNC: 15 MMOL/L (ref 3–16)
BASOPHILS ABSOLUTE: 0.1 K/UL (ref 0–0.2)
BASOPHILS RELATIVE PERCENT: 0.7 %
BUN BLDV-MCNC: 19 MG/DL (ref 7–20)
CALCIUM SERPL-MCNC: 9.9 MG/DL (ref 8.3–10.6)
CHLORIDE BLD-SCNC: 98 MMOL/L (ref 99–110)
CO2: 23 MMOL/L (ref 21–32)
CREAT SERPL-MCNC: 1.2 MG/DL (ref 0.8–1.3)
EOSINOPHILS ABSOLUTE: 0.2 K/UL (ref 0–0.6)
EOSINOPHILS RELATIVE PERCENT: 1.6 %
GFR AFRICAN AMERICAN: >60
GFR NON-AFRICAN AMERICAN: >60
GLUCOSE BLD-MCNC: 128 MG/DL (ref 70–99)
HCT VFR BLD CALC: 51.1 % (ref 40.5–52.5)
HEMOGLOBIN: 17.2 G/DL (ref 13.5–17.5)
LYMPHOCYTES ABSOLUTE: 2.5 K/UL (ref 1–5.1)
LYMPHOCYTES RELATIVE PERCENT: 20.2 %
MAGNESIUM: 2.1 MG/DL (ref 1.8–2.4)
MCH RBC QN AUTO: 33.3 PG (ref 26–34)
MCHC RBC AUTO-ENTMCNC: 33.6 G/DL (ref 31–36)
MCV RBC AUTO: 98.9 FL (ref 80–100)
MONOCYTES ABSOLUTE: 1 K/UL (ref 0–1.3)
MONOCYTES RELATIVE PERCENT: 8.4 %
NEUTROPHILS ABSOLUTE: 8.5 K/UL (ref 1.7–7.7)
NEUTROPHILS RELATIVE PERCENT: 69.1 %
PDW BLD-RTO: 13.9 % (ref 12.4–15.4)
PLATELET # BLD: 250 K/UL (ref 135–450)
PMV BLD AUTO: 10.4 FL (ref 5–10.5)
POTASSIUM SERPL-SCNC: 3.8 MMOL/L (ref 3.5–5.1)
RBC # BLD: 5.17 M/UL (ref 4.2–5.9)
SODIUM BLD-SCNC: 136 MMOL/L (ref 136–145)
WBC # BLD: 12.4 K/UL (ref 4–11)

## 2019-06-14 PROCEDURE — 80048 BASIC METABOLIC PNL TOTAL CA: CPT

## 2019-06-14 PROCEDURE — 83735 ASSAY OF MAGNESIUM: CPT

## 2019-06-14 PROCEDURE — 82668 ASSAY OF ERYTHROPOIETIN: CPT

## 2019-06-14 PROCEDURE — 36415 COLL VENOUS BLD VENIPUNCTURE: CPT

## 2019-06-14 PROCEDURE — 6360000002 HC RX W HCPCS: Performed by: INTERNAL MEDICINE

## 2019-06-14 PROCEDURE — 99238 HOSP IP/OBS DSCHRG MGMT 30/<: CPT | Performed by: INTERNAL MEDICINE

## 2019-06-14 PROCEDURE — 81270 JAK2 GENE: CPT

## 2019-06-14 PROCEDURE — 2580000003 HC RX 258: Performed by: INTERNAL MEDICINE

## 2019-06-14 PROCEDURE — 85025 COMPLETE CBC W/AUTO DIFF WBC: CPT

## 2019-06-14 PROCEDURE — 6370000000 HC RX 637 (ALT 250 FOR IP): Performed by: INTERNAL MEDICINE

## 2019-06-14 PROCEDURE — 99253 IP/OBS CNSLTJ NEW/EST LOW 45: CPT | Performed by: NURSE PRACTITIONER

## 2019-06-14 RX ORDER — HYDROCHLOROTHIAZIDE 25 MG/1
25 TABLET ORAL EVERY MORNING
Qty: 30 TABLET | Refills: 5 | Status: SHIPPED | OUTPATIENT
Start: 2019-06-14 | End: 2019-12-13 | Stop reason: SDUPTHER

## 2019-06-14 RX ORDER — METOPROLOL SUCCINATE 50 MG/1
100 TABLET, EXTENDED RELEASE ORAL DAILY
Status: DISCONTINUED | OUTPATIENT
Start: 2019-06-15 | End: 2019-06-14 | Stop reason: HOSPADM

## 2019-06-14 RX ORDER — METOPROLOL SUCCINATE 100 MG/1
100 TABLET, EXTENDED RELEASE ORAL DAILY
Qty: 30 TABLET | Refills: 3 | Status: SHIPPED | OUTPATIENT
Start: 2019-06-15 | End: 2019-10-09 | Stop reason: SDUPTHER

## 2019-06-14 RX ORDER — NICOTINE 21 MG/24HR
1 PATCH, TRANSDERMAL 24 HOURS TRANSDERMAL DAILY
Qty: 30 PATCH | Refills: 0 | Status: SHIPPED | OUTPATIENT
Start: 2019-06-15 | End: 2020-05-06

## 2019-06-14 RX ORDER — ACETAMINOPHEN 325 MG/1
650 TABLET ORAL EVERY 4 HOURS PRN
Status: DISCONTINUED | OUTPATIENT
Start: 2019-06-14 | End: 2019-06-14 | Stop reason: HOSPADM

## 2019-06-14 RX ORDER — DILTIAZEM HYDROCHLORIDE 240 MG/1
240 CAPSULE, COATED, EXTENDED RELEASE ORAL DAILY
Qty: 30 CAPSULE | Refills: 3 | Status: SHIPPED | OUTPATIENT
Start: 2019-06-14 | End: 2019-10-09 | Stop reason: SDUPTHER

## 2019-06-14 RX ORDER — POTASSIUM CHLORIDE 20 MEQ/1
40 TABLET, EXTENDED RELEASE ORAL ONCE
Status: COMPLETED | OUTPATIENT
Start: 2019-06-14 | End: 2019-06-14

## 2019-06-14 RX ORDER — MAGNESIUM SULFATE 1 G/100ML
1 INJECTION INTRAVENOUS ONCE
Status: COMPLETED | OUTPATIENT
Start: 2019-06-14 | End: 2019-06-14

## 2019-06-14 RX ADMIN — Medication 10 ML: at 08:26

## 2019-06-14 RX ADMIN — MAGNESIUM SULFATE HEPTAHYDRATE 1 G: 1 INJECTION, SOLUTION INTRAVENOUS at 06:37

## 2019-06-14 RX ADMIN — POTASSIUM CHLORIDE 40 MEQ: 20 TABLET, EXTENDED RELEASE ORAL at 06:36

## 2019-06-14 RX ADMIN — MOMETASONE FUROATE AND FORMOTEROL FUMARATE DIHYDRATE 2 PUFF: 100; 5 AEROSOL RESPIRATORY (INHALATION) at 09:00

## 2019-06-14 RX ADMIN — HYDROCHLOROTHIAZIDE 25 MG: 25 TABLET ORAL at 08:25

## 2019-06-14 RX ADMIN — PANTOPRAZOLE SODIUM 40 MG: 40 TABLET, DELAYED RELEASE ORAL at 06:02

## 2019-06-14 RX ADMIN — ACETAMINOPHEN 650 MG: 325 TABLET ORAL at 06:37

## 2019-06-14 RX ADMIN — APIXABAN 5 MG: 5 TABLET, FILM COATED ORAL at 08:25

## 2019-06-14 RX ADMIN — ATENOLOL 50 MG: 50 TABLET ORAL at 08:25

## 2019-06-14 ASSESSMENT — PAIN DESCRIPTION - ONSET: ONSET: GRADUAL

## 2019-06-14 ASSESSMENT — PAIN SCALES - GENERAL
PAINLEVEL_OUTOF10: 6
PAINLEVEL_OUTOF10: 0
PAINLEVEL_OUTOF10: 0

## 2019-06-14 ASSESSMENT — PAIN DESCRIPTION - FREQUENCY: FREQUENCY: CONTINUOUS

## 2019-06-14 ASSESSMENT — PAIN DESCRIPTION - PROGRESSION: CLINICAL_PROGRESSION: GRADUALLY WORSENING

## 2019-06-14 ASSESSMENT — PAIN - FUNCTIONAL ASSESSMENT: PAIN_FUNCTIONAL_ASSESSMENT: ACTIVITIES ARE NOT PREVENTED

## 2019-06-14 ASSESSMENT — PAIN DESCRIPTION - LOCATION: LOCATION: HEAD

## 2019-06-14 ASSESSMENT — PAIN DESCRIPTION - PAIN TYPE: TYPE: ACUTE PAIN

## 2019-06-14 ASSESSMENT — PAIN DESCRIPTION - DESCRIPTORS: DESCRIPTORS: HEADACHE

## 2019-06-14 NOTE — PLAN OF CARE
Problem: Pain:  Description  Pain management should include both nonpharmacologic and pharmacologic interventions. Goal: Pain level will decrease  Description  Pain level will decrease  6/14/2019 1058 by Robson Pham RN  Outcome: Ongoing  6/14/2019 0439 by Keagan Moody RN  Outcome: Ongoing  Goal: Control of acute pain  Description  Control of acute pain  6/14/2019 1058 by Robson Pham RN  Outcome: Ongoing  6/14/2019 0439 by Keagan Moody RN  Outcome: Ongoing  Goal: Control of chronic pain  Description  Control of chronic pain  6/14/2019 1058 by Robson Pham RN  Outcome: Ongoing  6/14/2019 0439 by Keagan Moody RN  Outcome: Ongoing     Problem:  Activity:  Goal: Ability to tolerate increased activity will improve  Description  Ability to tolerate increased activity will improve  6/14/2019 1058 by Robson Pham RN  Outcome: Ongoing  6/14/2019 0439 by Keagan Moody RN  Outcome: Ongoing  Goal: Expression of feelings of increased energy will increase  Description  Expression of feelings of increased energy will increase  6/14/2019 1058 by Robson Pham RN  Outcome: Ongoing  6/14/2019 0439 by Keagan Moody RN  Outcome: Ongoing     Problem: Cardiac:  Goal: Ability to maintain an adequate cardiac output will improve  Description  Ability to maintain an adequate cardiac output will improve  6/14/2019 1058 by Robson Pham RN  Outcome: Ongoing  6/14/2019 0439 by Keagan Moody RN  Outcome: Ongoing  Goal: Complications related to the disease process, condition or treatment will be avoided or minimized  Description  Complications related to the disease process, condition or treatment will be avoided or minimized  6/14/2019 1058 by Robson Pham RN  Outcome: Ongoing  6/14/2019 0439 by Keagan Moody RN  Outcome: Ongoing  Goal: Ability to maintain vital signs within normal range will improve  Description  Ability to maintain vital signs within normal range will improve  6/14/2019 1058 by Noemy Weinberg RN  Outcome: Ongoing  6/14/2019 0439 by Mariah Cunningham RN  Outcome: Ongoing  Goal: Cardiovascular alteration will improve  Description  Cardiovascular alteration will improve  6/14/2019 1058 by Noemy Weinberg RN  Outcome: Ongoing  6/14/2019 0439 by Mariah Cunningham RN  Outcome: Ongoing     Problem: Coping:  Goal: Level of anxiety will decrease  Description  Level of anxiety will decrease  6/14/2019 1058 by Noemy Weinberg RN  Outcome: Ongoing  6/14/2019 0439 by Mariah Cunningham RN  Outcome: Ongoing  Goal: General experience of comfort will improve  Description  General experience of comfort will improve  6/14/2019 1058 by Noemy Weinberg RN  Outcome: Ongoing  6/14/2019 0439 by Mariah Cunningham RN  Outcome: Ongoing     Problem: Health Behavior:  Goal: Ability to manage health-related needs will improve  Description  Ability to manage health-related needs will improve  6/14/2019 1058 by Noemy Weinberg RN  Outcome: Ongoing  6/14/2019 0439 by Mariah Cunningham RN  Outcome: Ongoing  Goal: Will modify at least one risk factor affecting health status  Description  Will modify at least one risk factor affecting health status  6/14/2019 1058 by Noemy Weinberg RN  Outcome: Ongoing  6/14/2019 0439 by Mariah Cunningham RN  Outcome: Ongoing  Goal: Identification of resources available to assist in meeting health care needs will improve  Description  Identification of resources available to assist in meeting health care needs will improve  6/14/2019 1058 by Noemy Weinberg RN  Outcome: Ongoing  6/14/2019 0439 by Mariah Cunningham RN  Outcome: Ongoing     Problem: Safety:  Goal: Ability to remain free from injury will improve  Description  Ability to remain free from injury will improve  6/14/2019 1058 by Noemy Weinberg RN  Outcome: Ongoing  6/14/2019 0439 by Mariah Cunningham RN  Outcome: Ongoing  Goal: Will show no signs and symptoms of excessive bleeding  Description  Will show no signs and symptoms of excessive bleeding  6/14/2019 1058 by Lawrence Blount RN  Outcome: Ongoing  6/14/2019 0439 by Ronaldo Devlin RN  Outcome: Ongoing     Problem: Physical Regulation:  Goal: Complications related to the disease process, condition or treatment will be avoided or minimized  Description  Complications related to the disease process, condition or treatment will be avoided or minimized  6/14/2019 1058 by Lawrence Blount RN  Outcome: Ongoing  6/14/2019 0439 by Ronaldo Devlin RN  Outcome: Ongoing

## 2019-06-14 NOTE — PROGRESS NOTES
4 Eyes Skin Assessment     The patient is being assess for  Admission    I agree that 2 RN's have performed a thorough Head to Toe Skin Assessment on the patient. ALL assessment sites listed below have been assessed. Areas assessed by both nurses:   [x]   Head, Face, and Ears   [x]   Shoulders, Back, and Chest  [x]   Arms, Elbows, and Hands   [x]   Coccyx, Sacrum, and IschIum  [x]   Legs, Feet, and Heels        Does the Patient have Skin Breakdown? No         Bart Prevention initiated:  Yes   Wound Care Orders initiated:  NA      [facility] nurse consulted for Pressure Injury (Stage 3,4, Unstageable, DTI, NWPT, and Complex wounds), New and Established Ostomies:  NA      Nurse 1 eSignature: Electronically signed by Lawyer Leonard RN on 6/12/19 at 7:07 PM    **SHARE this note so that the co-signing nurse is able to place an eSignature**    Nurse 2 eSignature: JIGAR Ren
Clinical Pharmacy Note  Medication Counseling    Reviewed new medications started during hospital admission: Apixaban. Indications and side effects were emphasized during counseling. All medication-related questions addressed. Patient verbalized understanding of education. Should the patient express any additional questions or concerns regarding their medications, please do not hesitate to contact the pharmacy department. Patient/caregiver aware they may refuse medications during hospital stay.    Magaly [place], [place] 6/14/2019 12:54 PM
PCA reported that patient had been smoking in the bathroom. RN spoke with patient and told him that he cannot smoke in the bathroom and that he is not allowed to leave the floor to smoke either.   Explained safety concerns to patient and he states that he understands and will not smoke
Patient continues to be is NSR this morning- currently 52. Overnight it was reported that his heart rate drops down to the 30's at times while he asleep.
Pt caught smoking in bathroom near elevator. Confronted pt and pt denied smoking. Educated pt on safety concerns and the danger of smoking with a nicotine patch on. Asked pt if he had any cigarettes and if I could lock them in the safe to remove temptation. Pt agreeable to RN locking up cigarettes and pt turned cigarettes over to RN. Pt advised that his cigarettes would be returned when he is discharged.   Later in night, pt apologized for smoking in hospital.
RN called [facility] heart two times during shift to see if cardiology was going to see patient today (at [phone]). Called on-call cardiologist at [phone]- Dr. Xenia King said that they will see him tomorrow and to make sure that the consult was in. Will re-consult cardiology. Patient remains in NSR at this time and is on eliquis.   Electronically signed by You Hussein RN on 6/13/2019 at 6:49 PM
Active Problems:    Atrial fibrillation with rapid ventricular response (HCC)    Erythrocytosis    Encounter for smoking cessation counseling  Resolved Problems:    * No resolved hospital problems. *      Plan:     1. Erythrocytosis. A Guille 2 mutation analysis was sent. This is greater than 90% sensitive and specific for polycythemia vera. I also checked an erythropoietin level which is usually low in polycythemia vera and elevated in secondary polycythemia. 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 For reasons we do not understand, these patients are not at an increased risk of strokes, heart attacks, blood clots and do not need to be treated. He should see me in 2 weeks after discharge. 2.  Tobacco abuse. I got a yearly screening CT which was negative. I talked to him about smoking cessation. He declined pharmacologic intervention. It is okay to discharge from my standpoint.         Electronically signed by Meir Martinez MD on 6/14/2019 at 8:37 AM

## 2019-06-14 NOTE — PLAN OF CARE
Problem: Pain:  Goal: Pain level will decrease  Description  Pain level will decrease  Outcome: Ongoing  Goal: Control of acute pain  Description  Control of acute pain  Outcome: Ongoing  Goal: Control of chronic pain  Description  Control of chronic pain  Outcome: Ongoing     Problem:  Activity:  Goal: Ability to tolerate increased activity will improve  Description  Ability to tolerate increased activity will improve  Outcome: Ongoing  Goal: Expression of feelings of increased energy will increase  Description  Expression of feelings of increased energy will increase  Outcome: Ongoing     Problem: Cardiac:  Goal: Ability to maintain an adequate cardiac output will improve  Description  Ability to maintain an adequate cardiac output will improve  Outcome: Ongoing  Goal: Complications related to the disease process, condition or treatment will be avoided or minimized  Description  Complications related to the disease process, condition or treatment will be avoided or minimized  Outcome: Ongoing  Goal: Ability to maintain vital signs within normal range will improve  Description  Ability to maintain vital signs within normal range will improve  Outcome: Ongoing  Goal: Cardiovascular alteration will improve  Description  Cardiovascular alteration will improve  Outcome: Ongoing     Problem: Coping:  Goal: Level of anxiety will decrease  Description  Level of anxiety will decrease  Outcome: Ongoing  Goal: General experience of comfort will improve  Description  General experience of comfort will improve  Outcome: Ongoing     Problem: Health Behavior:  Goal: Ability to manage health-related needs will improve  Description  Ability to manage health-related needs will improve  Outcome: Ongoing  Goal: Will modify at least one risk factor affecting health status  Description  Will modify at least one risk factor affecting health status  Outcome: Ongoing  Goal: Identification of resources available to assist in meeting health care needs will improve  Description  Identification of resources available to assist in meeting health care needs will improve  Outcome: Ongoing     Problem: Safety:  Goal: Ability to remain free from injury will improve  Description  Ability to remain free from injury will improve  Outcome: Ongoing  Goal: Will show no signs and symptoms of excessive bleeding  Description  Will show no signs and symptoms of excessive bleeding  Outcome: Ongoing     Problem: Physical Regulation:  Goal: Complications related to the disease process, condition or treatment will be avoided or minimized  Description  Complications related to the disease process, condition or treatment will be avoided or minimized  Outcome: Ongoing

## 2019-06-14 NOTE — CONSULTS
[address]   Electrophysiology Nurse Practitioner      Date: 6/14/2019    Reason for Consultation/ Chief Complaint: paroxysmal AF/RVR    History Obtained From: Patient and medical record. Cardiac Hx: Josephine Cuellar is a 62 y.o. man with PMH of COPD, chronic pain syndrome, depression, polycythemia, and HTN who presents to [facility] with weakness, lethargy, dizziness, and uncontrolled HTN (160/100) at PCP visit. He was also found to be in AF/RVR and started on cardizem gtt. Today: patient has converted back to NSR on telemetry. His BP slightly elevated today. He mentions fatigue and weakness which as slightly improved. He denies CP, palpitations, syncope, SOB and tightness. This is his first time ever with Atrial fibrillation. Past Medical History:   Diagnosis Date    Chronic pain syndrome     Chronic pain syndrome     COPD (chronic obstructive pulmonary disease) ([facility] 6/5/2012    DDD (degenerative disc disease), lumbar     Depression 6/5/2012    Diverticulitis 6/5/2012    DJD (degenerative joint disease) of knee     Foraminal stenosis of lumbar region     LORIE (generalized anxiety disorder) 6/5/2012    GERD (gastroesophageal reflux disease) 6/5/2012    HTN (hypertension) 6/5/2012    Incidental lung nodule     Insomnia     Lumbar radiculitis     Neuropathy 6/5/2012    Nontraumatic rupture of tendons of biceps (long head) 6/5/2012    Numbness and tingling of both legs     Pernicious anemia 6/5/2012    Sciatica     Sprain and strain of unspecified site of elbow and forearm 6/5/2012        Past Surgical History:   Procedure Laterality Date    MUSCLE REPAIR Bilateral 8-10 years ago    Pulled bilateral biceps off bone from lifting weights.        Allergies   Allergen Reactions    Lodine [Etodolac]     Neurontin [Gabapentin]      edema    Pregabalin Swelling    Vioxx Diarrhea    Wellbutrin [Bupropion Hcl]        Current Medications:   nicotine  1 patch Transdermal Daily    atraumatic. · Mouth/Throat: Oropharynx is clear and moist.   · Eyes: Conjunctivae clear without jaunduice. PERRL. · Neck: Neck supple. No rigidity. No JVD present. · Cardiovascular: Normal rate, regular rhythm, S1&S2. · Pulmonary/Chest: Bilateral respiratory sounds clear. No wheezes, no rales, no rhonchi. · Abdominal: Soft. Bowel sounds present. No distension, No tenderness. · Musculoskeletal: No tenderness. No edema    · Lymphadenopathy: Has no cervical adenopathy. · Neurological: Alert and oriented. Cranial nerve appears intact, No Gross deficit   · Skin: Skin is warm and dry. No rash noted. · Psychiatric: Has a normal mood, affect and behavior     Labs:  Reviewed. Recent Labs     06/12/19  1249 06/14/19  0921    136   K 3.4*  3.4* 3.8    98*   CO2 23 23   BUN 11 19   CREATININE 0.9 1.2     Recent Labs     06/12/19  1249 06/13/19  0531   WBC 10.9 10.9   HGB 17.8* 18.0*   HCT 53.5* 52.8*   MCV 99.3 99.6    240     Lab Results   Component Value Date    TROPONINI <0.01 06/12/2019     No results found for: BNP  Lab Results   Component Value Date    PROTIME 10.7 06/12/2019    PROTIME 10.9 02/08/2018    PROTIME 11.7 08/15/2012    INR 0.94 06/12/2019    INR 0.96 02/08/2018    INR 1.03 08/15/2012     Lab Results   Component Value Date    CHOL 253 02/20/2019    HDL 64 02/20/2019    TRIG 182 02/20/2019       Telemetry: Personally reviewed: Sinus Tom w/ PACs and PVCs 50-60s     Radiography: Personally reviewed: 6/12/19  Borderline cardiomegaly.  No evidence for acute cardiopulmonary pathology.      ECG: Personally reviewed: atrial fibrillation; , QRS 98 QTc 513    ECHO:  2/9/2018   Summary   Left ventricular size is mildly increased.   Normal left ventricular wall thickness.   Left ventricular function is normal with ejection fraction estimated at   55-60 %.   There is reversal of E/A inflow velocities across the mitral valve.   Mild thickening of anterior leaflet of mitral valve.   Trivial mitral regurgitation is present.   Trivial tricuspid regurgitation.   Trivial pulmonic regurgitation present. Stress Test: 2/12/2015  1. Technically a satisfactory study.    2. Normal pharmacological stress portion of the study.    3. Small area of reversibility representing ischemia involving the distal    anterior wall and apex (distal LAD territory) seen    4. Gated Study shows normal LV size and Systolic function; EF is 47%     Cardiac Angiography: 5/5/2015  OVERALL IMPRESSION:    1. Patent, nondominant right coronary artery. 2.  Patent left main trunk. 3.  Patent left anterior descending artery and its branches. 4.  Patent dominant circumflex artery and its branches. 5.  Normal left ventricular systolic function. 6.  Patent right femoral, common femoral artery. Assessment/Plan:   Cardiac Hx: Welby Galeazzi is a 62 y.o. man with PMH of COPD, chronic pain syndrome, depression, polycythemia, and HTN who presents to [facility] with weakness, lethargy, dizziness, and uncontrolled HTN (160/100) at PCP visit. He was also found to be in AF/RVR and started on cardizem gtt. [id number] 1 TSH 1.9 (2/20/19)    Paroxysmal AF  -SR on tele  -likely triggered by unconctrolled HTN  -will hold off on starting AAD  -Eliquis 5mg bid  -switch to metoprolol   -f/u with Dr. Manas Delgado in clinic to discuss management of AF    HTN  -stop atenolol   -start toprol xl 100mg daily  -if still uncontrolled outpatient, would add ACE    Patient is OK to be d/c home from cardiac standpoint. Would stop atenolol and start Toprol XL 100mg QD tomorrow at home. Please call with any questions or concerns. He will follow up with Dr. Manas Delgado to discuss management of AF. EF of 55-60%  Anticoagulation for AF-Eliquis 5mg BID  No tobacco use.      [facility]  Electrophysiology

## 2019-06-15 LAB — ERYTHROPOIETIN: 4 MU/ML (ref 4–27)

## 2019-06-18 NOTE — DISCHARGE SUMMARY
Occurrences:   1    CBC Auto Differential     Standing Status:   Standing     Number of Occurrences:   1    Basic Metabolic Panel     Standing Status:   Standing     Number of Occurrences:   1    CBC auto differential     Standing Status:   Standing     Number of Occurrences:   1    Magnesium     Standing Status:   Standing     Number of Occurrences:   1    Basic Metabolic Panel     Standing Status:   Standing     Number of Occurrences:   1    Magnesium     Standing Status:   Standing     Number of Occurrences:   1    JAK2 Gene Mutation Quant     Standing Status:   Standing     Number of Occurrences:   1     Order Specific Question:   Clinical History     Answer:   erythrocytosis     Order Specific Question:   Diagnosis     Answer:   N/A     Order Specific Question:   Special Instructions/Surgery Date     Answer:   N/A    Erythropoietin     Standing Status:   Standing     Number of Occurrences:   1    CBC auto differential     Standing Status:   Standing     Number of Occurrences:   1    Inpatient consult to Primary Care Provider     Dr. Evie Diaz     Standing Status:   Standing     Number of Occurrences:   1     Order Specific Question:   Reason for Consult? Answer:   admission     Order Specific Question:   Provider Contacted? Answer:   No    Consult to Cardiology     Standing Status:   Standing     Number of Occurrences:   1     Order Specific Question:   Reason for Consult? Answer:   afib    Inpatient consult to Oncology     Standing Status:   Standing     Number of Occurrences:   1     Order Specific Question:   Reason for Consult? Answer:   erythrocytosis    Inpatient consult to Cardiology     Standing Status:   Standing     Number of Occurrences:   1     Order Specific Question:   Reason for Consult?      Answer:   afib rvr    Pulse Oximetry Spot Check     Standing Status:   Standing     Number of Occurrences:   1    EKG 12 Lead     Standing Status:   Standing     Number of Occurrences:   1     Order Specific Question:   Reason for Exam?     Answer: Tachycardia    EKG 12 Lead     Standing Status:   Standing     Number of Occurrences:   1     Order Specific Question:   Reason for Exam?     Answer:   Irregular heart rate    Saline lock IV     Standing Status:   Standing     Number of Occurrences:   1    PATIENT STATUS (FROM ED OR OR/PROCEDURAL) Inpatient     Standing Status:   Standing     Number of Occurrences:   1     Order Specific Question:   Patient Class     Answer:   Inpatient [[id number]]     Order Specific Question:   REQUIRED: Diagnosis     Answer:   A-fib [facility]) [[id number]]     Order Specific Question:   Estimated Length of Stay     Answer:   Estimated stay of more than 2 midnights     Order Specific Question:   Future Attending Provider     Answer:   Esther Dyer [[id number]]     Order Specific Question:   Telemetry Bed Required? Answer: Yes    Discharge patient     Standing Status:   Standing     Number of Occurrences:   1     Order Specific Question:   Discharge Disposition     Answer:   Home       No current facility-administered medications for this encounter. Current Outpatient Medications   Medication Sig Dispense Refill    apixaban (ELIQUIS) 5 MG TABS tablet Take 1 tablet by mouth 2 times daily 60 tablet 2    nicotine (NICODERM CQ) 21 MG/24HR Place 1 patch onto the skin daily 30 patch 0    diltiazem (CARTIA XT) 240 MG extended release capsule Take 1 capsule by mouth daily 30 capsule 3    hydrochlorothiazide (HYDRODIURIL) 25 MG tablet Take 1 tablet by mouth every morning 30 tablet 5    metoprolol succinate (TOPROL XL) 100 MG extended release tablet Take 1 tablet by mouth daily 30 tablet 3    Fluticasone-Umeclidin-Vilant (TRELEGY ELLIPTA) 100-62.5-25 MCG/INH AEPB Inhale 1 puff into the lungs daily      HYDROcodone-acetaminophen (NORCO) 5-325 MG per tablet Take 1 tablet by mouth every 8 hours as needed for Pain for up to 30 days.  90 tablet 0    omeprazole daily     Dispense:  30 capsule     Refill:  3    hydrochlorothiazide (HYDRODIURIL) 25 MG tablet     Sig: Take 1 tablet by mouth every morning     Dispense:  30 tablet     Refill:  5    DISCONTD: metoprolol succinate (TOPROL XL) extended release tablet 100 mg    metoprolol succinate (TOPROL XL) 100 MG extended release tablet     Sig: Take 1 tablet by mouth daily     Dispense:  30 tablet     Refill:  3           Objective:   Vitals: BP (!) 148/89   Pulse 61   Temp 97.7 °F (36.5 °C) (Oral)   Resp 16   Ht 5' 8\" (1.727 m)   Wt 180 lb 5.4 oz (81.8 kg)   SpO2 95%   BMI 27.42 kg/m²   General appearance: alert,awake,oriented x 3 and cooperative with exam  HEENT: Head: Normal, normocephalic, atraumatic, anicteric, pupils are reactive to light. Dry mucous membrane.   Neck: no adenopathy, no carotid bruit, supple, symmetrical, trachea midline and thyroid not enlarged, symmetric, no tenderness/mass/nodules  Lungs: clear  Heart: regular rate and rhythm, S1, S2 normal, no murmur, click, rub or gallop  Abdomen: soft, non-tender; bowel sounds normal; no masses,  no organomegaly  Extremities: extremities normal, Neurologic: Mental status: Alert, oriented, thought content appropriate    Assessment and Plan:   Uncontrolled HTN  Paroxysmal afib  Patient Active Problem List:     GERD (gastroesophageal reflux disease)     Dysthymic disorder     Neuropathy     Nontraumatic rupture of tendons of biceps (long head)     Sprain and strain of unspecified site of elbow and forearm     LORIE (generalized anxiety disorder)     Diverticulitis     Hypertension     Pernicious anemia     Chronic obstructive pulmonary disease (HCC)     Tobacco abuse     Chronic pain syndrome     Lung nodule     Sciatica     Numbness and tingling of both legs     Neuropathy, peroneal nerve     DDD (degenerative disc disease), lumbar     Lumbar radiculitis     Insomnia     DJD (degenerative joint disease) of knee     Foraminal stenosis of lumbar region     Back pain, chronic     Need for prophylactic vaccination and inoculation against influenza     Acute gouty arthropathy     Knee arthropathy     Edema     Cervical paraspinal muscle spasm     Chest pain     Pleurisy     Chest pain     HLD (hyperlipidemia)     COPD exacerbation (HCC)     Chest pain     Chest pain     Hypokalemia     Hypokalemia     Abnormal nuclear stress test     Uncontrolled hypertension     Atrial fibrillation ([address]     Erythrocytosis     Encounter for smoking cessation counseling     Paroxysmal atrial fibrillation with rapid ventricular response ([address]    Pt is stable. Discussed with staff and pt about the plan. See the orders for further plan. Will proceed further acc to pt's progress. Time spent with management of the pt is- 20 mts  Follow up in office in 1 wk. See the [address] and Med rec forms  Follow up with specialists as instructed by them. Advised the pt to call me in case of questions or worsening of symptoms. Pt voiced understanding of the instructions.   Condition and prognosis is guarded      Electronically signed by: Eva Nation MD, on 6/18/2019, at 12:10 AM

## 2019-06-19 LAB — JAK2 V617F MUTATION: 0 %

## 2019-06-21 ENCOUNTER — TELEPHONE (OUTPATIENT)
Dept: CARDIOLOGY CLINIC | Age: 64
End: 2019-06-21

## 2019-06-21 NOTE — TELEPHONE ENCOUNTER
Patient discharged 6/14, needs follow up with Dr. Manas Delgado to discuss a fib ablation. Next available appointment is okay. Please call patient to schedule.  Thanks

## 2019-10-10 RX ORDER — METOPROLOL SUCCINATE 100 MG/1
100 TABLET, EXTENDED RELEASE ORAL DAILY
Qty: 30 TABLET | Refills: 0 | OUTPATIENT
Start: 2019-10-10

## 2021-12-23 ENCOUNTER — HOSPITAL ENCOUNTER (OUTPATIENT)
Dept: CT IMAGING | Age: 66
Discharge: HOME | End: 2021-12-23
Payer: COMMERCIAL

## 2021-12-23 DIAGNOSIS — F17.200 TOBACCO USE DISORDER: ICD-10-CM

## 2021-12-23 PROCEDURE — 71271 CT THORAX LUNG CANCER SCR C-: CPT

## 2022-01-19 NOTE — PROGRESS NOTES
Cardiology Consultation     Garciashira Garcia  1955    PCP: Danial Danielle MD  Referring Physician: LONDON FLORES  Reason for Referral: coronary calcification CT scan 12/23/21      Chief Complaint:   Chief Complaint   Patient presents with   [facility] Cardiologist     no cc       Subjective:     History of Present Illness: The patient is 73 y.o. male with a past medical history significant for GERD, DDD, COPD, polycythemia, macrocytosis, mild thrombocytopenia, paroxsymal atrial fibrillation, HTN, HLD, smoking addiction, cardiac workup with abnormal stress test, normal echo and LHC 2015 revealing  normal coronary arteries, normal left ventricular systolic function with an estimated EF of 50% to 55%. Last echo 2018 LVEF 55-60%. [facility] coronary artery calcification per CT lung screrening on 12/23/21. Today, he reports that he does not currently have any new or recurrent cardiac sounding complaints today. He walks with random, occasional right upper chest discomfort, brief in nature and resolves on own with continued walking. He is a current smoker and reports DDD with severe back pain. He does not have any complaints of leg fatigue, cramping or pain with exertion. The patient admits to medical therapy compliance and tolerating. Patient denies exertional chest pain/pressure, dyspnea at rest,  PND, orthopnea, palpitations, lightheadedness, weight changes, changes in LE edema, and syncope.         Past Medical History:   Diagnosis Date    Chronic pain syndrome     Chronic pain syndrome     COPD (chronic obstructive pulmonary disease) (HCC) 6/5/2012    DDD (degenerative disc disease), lumbar     Depression 6/5/2012    Diverticulitis 6/5/2012    DJD (degenerative joint disease) of knee     Foraminal stenosis of lumbar region     LORIE (generalized anxiety disorder) 6/5/2012    GERD (gastroesophageal reflux disease) 6/5/2012    HTN (hypertension) Take 1 tablet by mouth daily (Patient not taking: Reported on 1/21/2022) 90 tablet 3     No current facility-administered medications for this visit. Review of Systems:  · Constitutional: No unanticipated weight loss. There's been no change in energy level, sleep pattern, or activity level. No fevers, chills. · Eyes: No visual changes or diplopia. No scleral icterus. · ENT: No Headaches, hearing loss or vertigo. No mouth sores or sore throat. · Cardiovascular: as reviewed in HPI  · Respiratory: No cough or wheezing, no sputum production. No hemoptysis. · Gastrointestinal: No abdominal pain, appetite loss, blood in stools. No change in bowel or bladder habits. · Genitourinary: No dysuria, trouble voiding, or hematuria. · Musculoskeletal:  No gait disturbance, no joint complaints. · Integumentary: No rash or pruritis. · Neurological: No headache, diplopia, change in muscle strength, numbness or tingling. · Psychiatric: No anxiety or depression. · Endocrine: No temperature intolerance. No excessive thirst, fluid intake, or urination. No tremor. · Hematologic/Lymphatic: No abnormal bruising or bleeding, blood clots or swollen lymph nodes. · Allergic/Immunologic: No nasal congestion or hives. Physical Exam:   /88   Pulse 72   Ht 5' 8\" (1.727 m)   Wt 196 lb (88.9 kg)   SpO2 97%   BMI 29.80 kg/m²   Wt Readings from Last 3 Encounters:   01/21/22 196 lb (88.9 kg)   11/30/21 188 lb (85.3 kg)   09/29/21 181 lb (82.1 kg)     Constitutional: He is oriented to person, place, and time. He appears well-developed and well-nourished. In no acute distress. Head: Normocephalic and atraumatic. Pupils equal and round. Neck: Neck supple. No JVP or carotid bruit appreciated. No mass and no thyromegaly present. No lymphadenopathy present. Cardiovascular: Normal rate. Normal heart sounds. Exam reveals no gallop and no friction rub. No murmur heard.   Pulmonary/Chest: Effort normal and breath sounds normal. No respiratory distress. He has no wheezes, rhonchi or rales. Abdominal: Soft, non-tender. Bowel sounds are normal. He exhibits no organomegaly, mass or bruit. Extremities: No edema. No cyanosis or clubbing. Pulses are 2+ radial/carotid bilaterally. Neurological: No gross cranial nerve deficit. Coordination normal.   Skin: Skin is warm and dry. There is no rash or diaphoresis. Psychiatric: He has a normal mood and affect. His speech is normal and behavior is normal.     Lab Review:   Lab Results   Component Value Date    TRIG 249 08/25/2021    HDL 44 08/25/2021    LDLCALC 193 08/25/2021    LABVLDL 48 08/25/2021      Lab Results   Component Value Date    BUN 18 08/25/2021    CREATININE 1.41 08/25/2021     Lab Results   Component Value Date/Time    TROPONINI <0.01 06/12/2019 12:49 PM    LABA1C 5.9 (H) 08/25/2021 12:00 AM      No results found for: CBCAUTODIF    EKG Interpretation: 1/21/22: controlled AFIB     Echo: 2/9/2018   Left ventricular size is mildly increased. Normal left ventricular wall thickness. Left ventricular function is normal with ejection fraction estimated at   55-60 %. There is reversal of E/A inflow velocities across the mitral valve. Mild thickening of anterior leaflet of mitral valve. Trivial mitral regurgitation is present. Trivial tricuspid regurgitation. Trivial pulmonic regurgitation present. Stress test: 2/9/2018   Baseline ECG makes stress test difficult to interpret but no clear ischemic   ECG changes were seen   frequent PVC developing during the exercise phase   Poor exercise capacity     Echo: 2/12/2015   Summary   Normal LV size and systolic function. Estimated ejection fraction is 55-60%. There is trivial tricuspid regurgitation with RVSP estimated at 32 mmHg. Stress Test: 2/12/2015   Summary    Pharmacological Stress/MPI Results:        1.  Technically a satisfactory study.    2. Normal pharmacological stress portion of the study.    3. Small area of reversibility representing ischemia involving the distal    anterior wall and apex (distal LAD territory) seen    4. Gated Study shows normal LV size and Systolic function; EF is 57%     Cat: 2/14/2015  LEFT VENTRICULOGRAM:  Reveals a normal left ventricular systolic function  with an estimated EF of 50% to 55%. OVERALL IMPRESSION:    1. Patent, nondominant right coronary artery. 2.  Patent left main trunk. 3.  Patent left anterior descending artery and its branches. 4.  Patent dominant circumflex artery and its branches. 5.  Normal left ventricular systolic function. 6.  Patent right femoral, common femoral artery. In view of the above findings will look for other etiologies for the patients chest pain. CT chest wo contrast: 6/13/19     FINDINGS:   Mediastinum: Coronary artery calcifications are a marker of atherosclerosis. The heart is enlarged.  There are no enlarged thoracic lymph nodes. Calcified granulomatous disease is noted.       Lungs/pleura: The tracheobronchial tree is patent. Suan Bunkers is no pneumothorax   or pleural effusion.  There is bibasilar atelectasis.  Mild emphysema   involves the bilateral upper lungs.       Upper Abdomen: Images of the upper abdomen are unremarkable.       Soft Tissues/Bones: Degenerative changes involve the thoracic spine. CT: lung screen 12/23/21  FINDINGS:   Mediastinum:  Thyroid gland is unremarkable.  Atherosclerotic change seen in   aorta.  Small calcified and noncalcified mediastinal nodes are noted. Moderate coronary artery calcification is seen.  Small hiatal hernia seen. There is nonspecific thickening at the GE junction       Lungs/Pleura:  Linear and curvilinear opacities are seen in the apices,   compatible with scarring.       Mild underlying emphysema is seen.  No large blebs or bulla.  No pneumonia.    No edema.  No pleural effusion       Punctate calcified granuloma seen in the right upper lobe.       A small noncalcified pulmonary nodule superiorly medially in the right upper   lobe measures 3 mm, similar       Upper Abdomen:  Right adrenal gland is normal.  Left adrenal gland is normal.   Atherosclerotic change seen in abdominal aorta       Soft Tissues/Bones: Spurring is seen the spine.  Spurring is seen in the   shoulder joints           Doppler:     All above diagnostic testing and laboratory data was independently visualized and reviewed by me (not simply review of report)       Assessment and Plan   1) Coronary artery calcification  -per CT lung screrening on 12/23/21.   -cardiac workup with stress test, echo and C 2015 normal coronary arteries, normal left ventricular systolic function with an estimated EF of 50% to 55%. -Last echo 2018 LVEF 55-60%. -denies s/s of angina  -will repeat Lexiscan myoview to further assess  -on ASA, did not tolerate Lipitor  -will trial on Zetia     2) Paroxsymal atrial fibrillation  -[id number] is 2 for age and HTN. -Risks and benefits discussed   -He is currently on diltiazem  -Will start him on Eliquis 5 mg  -30 day Event monitor    3) HTN, essential   -BP stable today   -Continue current medical therapy     4) HLD, unspecified  -currently not taking Lipitor 20 mg daily with nausea, uncontrollable diarrhea  -, LDLc 193, , A1c 5.9  -Zetia 10 mg daily     5) Smoking addiction  -stress importance of smoking cessation and spent 3-5 mg daily   -abdominal aorta imaging to r/o AAA    6) COPD  -Encouraged smoking cessation     7) Polycythemia, macrocytosis, mild thrombocytopenia,  -Managed per Dr. Cheng Drivers     We will plan to f/u in 7-8 months     Thank you very much for allowing me to participate in the care of your patient. Please do not hesitate to contact me if you have any questions.       Luis Thomson MD [address], Interventional Cardiology, and Peripheral Vascular Disease   [facility]   Ph: [phone]  Fax: [phone]      This note was scribed in the presence of Dr. Abdiel Gates MD by Hamida Headley RN. Physician Attestation:  The scribes documentation has been prepared under my direction and personally reviewed by me in its entirety. I confirm the note above accurately reflects all work, treatment, procedures, and medical decision making performed by me.     Electronically signed by Jeancarlos Newton MD on 2/10/2022 at 9:20 PM

## 2022-01-21 ENCOUNTER — OFFICE VISIT (OUTPATIENT)
Dept: CARDIOLOGY CLINIC | Age: 67
End: 2022-01-21
Payer: COMMERCIAL

## 2022-01-21 VITALS
BODY MASS INDEX: 29.7 KG/M2 | HEART RATE: 72 BPM | DIASTOLIC BLOOD PRESSURE: 88 MMHG | HEIGHT: 68 IN | SYSTOLIC BLOOD PRESSURE: 124 MMHG | OXYGEN SATURATION: 97 % | WEIGHT: 196 LBS

## 2022-01-21 DIAGNOSIS — I48.0 PAROXYSMAL ATRIAL FIBRILLATION (HCC): ICD-10-CM

## 2022-01-21 DIAGNOSIS — F17.200 SMOKING ADDICTION: ICD-10-CM

## 2022-01-21 DIAGNOSIS — I25.84 CORONARY ARTERY CALCIFICATION: Primary | ICD-10-CM

## 2022-01-21 DIAGNOSIS — I10 PRIMARY HYPERTENSION: ICD-10-CM

## 2022-01-21 DIAGNOSIS — E78.5 HYPERLIPIDEMIA, UNSPECIFIED HYPERLIPIDEMIA TYPE: ICD-10-CM

## 2022-01-21 DIAGNOSIS — I25.10 CORONARY ARTERY CALCIFICATION: Primary | ICD-10-CM

## 2022-01-21 PROCEDURE — 1123F ACP DISCUSS/DSCN MKR DOCD: CPT | Performed by: INTERNAL MEDICINE

## 2022-01-21 PROCEDURE — G8427 DOCREV CUR MEDS BY ELIG CLIN: HCPCS | Performed by: INTERNAL MEDICINE

## 2022-01-21 PROCEDURE — 4004F PT TOBACCO SCREEN RCVD TLK: CPT | Performed by: INTERNAL MEDICINE

## 2022-01-21 PROCEDURE — 93000 ELECTROCARDIOGRAM COMPLETE: CPT | Performed by: INTERNAL MEDICINE

## 2022-01-21 PROCEDURE — 4040F PNEUMOC VAC/ADMIN/RCVD: CPT | Performed by: INTERNAL MEDICINE

## 2022-01-21 PROCEDURE — G8484 FLU IMMUNIZE NO ADMIN: HCPCS | Performed by: INTERNAL MEDICINE

## 2022-01-21 PROCEDURE — 3017F COLORECTAL CA SCREEN DOC REV: CPT | Performed by: INTERNAL MEDICINE

## 2022-01-21 PROCEDURE — G8417 CALC BMI ABV UP PARAM F/U: HCPCS | Performed by: INTERNAL MEDICINE

## 2022-01-21 PROCEDURE — 99204 OFFICE O/P NEW MOD 45 MIN: CPT | Performed by: INTERNAL MEDICINE

## 2022-01-21 RX ORDER — ASPIRIN 81 MG/1
81 TABLET, CHEWABLE ORAL DAILY
Status: ON HOLD | COMMUNITY
End: 2022-03-21 | Stop reason: HOSPADM

## 2022-01-21 RX ORDER — EZETIMIBE 10 MG/1
10 TABLET ORAL DAILY
Qty: 30 TABLET | Refills: 6 | Status: SHIPPED | OUTPATIENT
Start: 2022-01-21 | End: 2022-06-22 | Stop reason: SDUPTHER

## 2022-01-21 NOTE — PATIENT INSTRUCTIONS
your doctor wants you to do. These medicines increase the risk of bleeding. Tell your doctor if you are taking any erection-enhancing medicines (such as Cialis, Levitra, or Viagra). You may need to take nitroglycerin during this test, which can cause a serious reaction if you have taken an erection-enhancing medicine within the previous 48 hours. Do not have any caffeine, such as coffee or tea, for 24 hours before the test.  If you are breastfeeding, you may want to pump enough breast milk before the test to get through 1 to 2 days of feeding. The radioactive tracer used in this test can get into your breast milk and is not good for the baby. How is the test done? Resting or baseline scan  · You will take your top off and be given a gown to wear. · Electrodes will be attached to your chest to keep track of your heartbeats. · You will have a tube, called an IV, going into your arm. A small amount of the radioactive tracer will be put in the IV. · You will lie on your back or your stomach on a table with a large camera positioned above your chest. The camera records the tracer's signals as it moves through your blood. · You will be asked to remain very still during each scan, which takes about 5 to 10 minutes. Several scans will be taken. Stress scan using medicine  The stress scan is done in two parts. In many hospitals, you first have the resting scan. You are then given a medicine that makes your heart work harder and you have another scan. Sometimes the stress scan is done first.  · You will have a test called an electrocardiogram (EKG or ECG), which takes about 5 to 10 minutes. You may have other EKGs during and after the stress test.  · Medicine will be put in your IV. It will make your heart work harder. You may get a headache and feel dizzy, flushed, and nauseated from the medicine, but these symptoms usually do not last long. · Your heartbeat and blood pressure may be checked.   · Your symptoms such as chest pain and shortness of breath will be checked. · A few minutes after you get the medicine, another small amount of radioactive tracer is injected. You may be given something to reverse the medicine used to make your heart work harder. · You will wait for 30 to 40 minutes and then have another resting scan. What else should you know about the test?  · Sometimes more pictures are taken 2 to 4 hours after the stress scan. · No electricity passes through your body during the test. There is no danger of getting an electrical shock. · Anytime you're exposed to radiation, there's a small chance of damage to cells or tissue. That's the case even with the low-level radioactive tracer used for this test. But the chance of damage is very low compared with the benefits of the test.  · Most of the tracer will leave your body through your urine or stool within a day. So be sure to flush the toilet right after you use it, and wash your hands well with soap and water. The amount of radiation in the tracer is very small. This means it isn't a risk for people to be around you after the test.  What happens after the test?  · You will probably be able to go home right away. · You can go back to your usual activities right away. When should you call for help? Call 911 anytime you think you may need emergency care. For example, call if:  · You passed out (lost consciousness). · You have been diagnosed with angina, and you have angina symptoms that do not go away with rest or are not getting better within 5 minutes after you take a dose of nitroglycerin. · You have symptoms of a heart attack. These may include:  ? Chest pain or pressure, or a strange feeling in the chest.  ? Sweating. ? Shortness of breath. ? Nausea or vomiting. ? Pain, pressure, or a strange feeling in the back, neck, jaw, or upper belly or in one or both shoulders or arms. ? Lightheadedness or sudden weakness.   ? A fast or irregular heartbeat. After you call 911, the  may tell you to chew 1 adult-strength or 2 to 4 low-dose aspirin. Wait for an ambulance. Do not try to drive yourself. Watch closely for changes in your health, and be sure to contact your doctor if you have any problems. Follow-up care is a key part of your treatment and safety. Be sure to make and go to all appointments, and call your doctor if you are having problems. It's also a good idea to keep a list of the medicines you take. Ask your doctor when you can expect to have your test results. Where can you learn more? Go to https://Lentigenpepiceweb.[email or web address] org and sign in to your [email or web address] account. Enter R320 in the uShare box to learn more about \"Cardiac Perfusion Scan (Medicine): About This Test.\"     If you do not have an account, please click on the \"Sign Up Now\" link. Current as of: April 29, 2021               Content Version: 13.1  © 2006-2021 [employer] Care instructions adapted under license by [facility] [place] ([facility]). If you have questions about a medical condition or this instruction, always ask your healthcare professional. [place] [address] any warranty or liability for your use of this information. Patient Education        Doppler Ultrasound: About This Test  What is it? An ultrasound uses reflected sound waves to produce a picture of organs and blood vessels. The sound waves create a picture on a video screen. Doppler ultrasound is a special kind of ultrasound. It can detect movement of blood through arteries and veins. Some ultrasound tests are called \"duplex. \" Duplex means \"two parts. \" A duplex ultrasound combines the Doppler ultrasound with the more common ultrasound. The combination can help the doctor see more clearly what's going on. There are no risks linked to an ultrasound test, and it is safe for pregnant women. It won't harm the baby (fetus). Why is this test done?   You might have a Doppler ultrasound to:  · Look for reduced blood flow in major neck arteries. Low blood flow in these arteries can cause a stroke. · Find a blood clot in leg veins, which could be a deep vein thrombosis. · Check blood flow in a fetus to check the health of the fetus. · Find a blockage or a narrowing in the arteries that go to the kidneys. How do you prepare for the test?  Depending on what the test is for, you may be asked not to eat or drink after midnight before the test. Or you may be asked to drink water right before the test so that your bladder is full. How is the test done? The doctor or ultrasound technologist will have you lie on your back, side, or stomach, depending on which part of your body is being examined. · A gel will be applied to your skin. This helps the passage of sound waves. · A hand-held device called a transducer will be moved along your skin. · You'll need to stay still during the test.  How long does the test take? The test will take 30 to 60 minutes. What happens after the test?  · The scans from the test will be read within a short time, in case a repeat test is needed. · You will probably be able to go home as soon as the test has been read. · You can go back to your usual activities right away. Follow-up care is a key part of your treatment and safety. Be sure to make and go to all appointments, and call your doctor if you are having problems. It's also a good idea to keep a list of the medicines you take. Ask your doctor when you can expect to have your test results. Where can you learn more? Go to https://Cogniimeghann.[email or web address] org and sign in to your Jianshu account. Enter B308 in the [facility] box to learn more about \"Doppler Ultrasound: About This Test.\"     If you do not have an account, please click on the \"Sign Up Now\" link. Current as of: June 17, 2021               Content Version: 13.1  © 0032-8605 Healthwise, [facility].    Care instructions adapted under license by [facility] ([facility]). If you have questions about a medical condition or this instruction, always ask your healthcare professional. [place] [address] any warranty or liability for your use of this information. Patient Education        Cardiac Event Monitoring: About This Test  What is it? A cardiac event monitor is a small device that you wear or keep with you. It records the electrical activity of your heart. It records times when your heartbeat is too fast, too slow, or irregular. These are called cardiac events. The monitor will give your doctor the same kind of information as an electrocardiogram (EKG or ECG). An EKG shows the heart's electrical activity as line tracings on paper. There are different types of monitors. Your doctor will choose the type that works best for you and is most likely to help find your heart problem. Why is this test done? This test is used to look for irregular heartbeats. It can help your doctor find out what is causing symptoms such as chest pain, fainting, or lightheadedness. It also can help the doctor see if treatment for an abnormal heartbeat is working. Many people have abnormal heartbeats from time to time. Because these kinds of heartbeats can come and go, it may be hard to record one while you are in the doctor's office. Tracking your heartbeat for a longer time and during your whole day makes it easier to record your cardiac events. How is the test done? If you are getting a monitor with electrode pads on your chest:  · Several areas on your chest may be shaved and cleaned. Then a small amount of gel will be put on those areas. The electrode pads will then be attached to the skin of your chest. Thin wires will connect the electrodes to the monitor. · You will get instructions for how and when to change the electrodes at home. Some types of monitors don't use electrode pads.  Some types are worn on your wrist like a watch. Others are stuck to your chest with a sticky patch. Or you may have a monitor that you carry with you. Your doctor will explain which type of monitor you have and how to use it. Some monitors start recording on their own when they detect an abnormal heartbeat. With others, you may have to start the recording when you have symptoms. Your doctor will explain which type of monitor you have and how to use it. How is the monitor used? · With some monitors, you may need to do something to record when you have symptoms. You might use a handheld device to start it. Or you may need to press a button on the monitor. · You may keep a diary of what you were doing when you had symptoms such as chest pain or dizziness. Your doctor will show you how to do this. · You may need to send the information from your monitor to your doctor through a phone line or online. Some monitors send it on their own. You will get instructions from your doctor. Your information will stay private and secure no matter how it's sent. · You may be able to do most of the things you usually do. But try to follow your doctor's instructions for bathing, exercise, and other activities. How long will you have the monitor? You may use the monitor for up to a month or longer. It depends on how long it takes to record irregular heartbeat episodes. It also depends on how long your doctor wants to keep monitoring your heart. What happens after the test?  · Raúl García return the monitor to your doctor's office or hospital.  · You'll meet with your doctor to talk about the information recorded during your test.  · Your doctor will check your diary of symptoms. He or she will compare the timing of your activities and symptoms with the recorded heart pattern. · Depending on your test results, your doctor may talk with you about other tests or treatment options. Follow-up care is a key part of your treatment and safety.  Be sure to make and go to all appointments, and call your doctor if you are having problems. It's also a good idea to keep a list of the medicines you take. Ask your doctor when you can expect to have your test results. Where can you learn more? Go to https://chpelaurita.FeedVisor. org and sign in to your Naehas account. Enter F132 in the FriendFeed box to learn more about \"Cardiac Event Monitoring: About This Test.\"     If you do not have an account, please click on the \"Sign Up Now\" link. Current as of: April 29, 2021               Content Version: 13.1  © 9745-9715 Healthwise, [employer]. Care instructions adapted under license by [facility] ([facility]). If you have questions about a medical condition or this instruction, always ask your healthcare professional. [address] any warranty or liability for your use of this information.

## 2022-01-26 ENCOUNTER — HOSPITAL ENCOUNTER (OUTPATIENT)
Dept: VASCULAR LAB | Age: 67
Discharge: HOME | End: 2022-01-26
Payer: COMMERCIAL

## 2022-01-26 ENCOUNTER — HOSPITAL ENCOUNTER (OUTPATIENT)
Dept: ULTRASOUND IMAGING | Age: 67
Discharge: HOME | End: 2022-01-26
Payer: COMMERCIAL

## 2022-01-26 DIAGNOSIS — I25.10 CORONARY ARTERY CALCIFICATION: ICD-10-CM

## 2022-01-26 DIAGNOSIS — I10 PRIMARY HYPERTENSION: ICD-10-CM

## 2022-01-26 DIAGNOSIS — E78.5 HYPERLIPIDEMIA, UNSPECIFIED HYPERLIPIDEMIA TYPE: ICD-10-CM

## 2022-01-26 DIAGNOSIS — F17.200 SMOKING ADDICTION: ICD-10-CM

## 2022-01-26 DIAGNOSIS — I25.84 CORONARY ARTERY CALCIFICATION: ICD-10-CM

## 2022-01-26 PROCEDURE — 76706 US ABDL AORTA SCREEN AAA: CPT

## 2022-01-26 PROCEDURE — 93880 EXTRACRANIAL BILAT STUDY: CPT

## 2022-01-28 ENCOUNTER — TELEPHONE (OUTPATIENT)
Dept: CARDIOLOGY CLINIC | Age: 67
End: 2022-01-28

## 2022-01-28 ENCOUNTER — HOSPITAL ENCOUNTER (OUTPATIENT)
Dept: NON INVASIVE DIAGNOSTICS | Age: 67
Discharge: HOME | End: 2022-01-28

## 2022-01-28 DIAGNOSIS — E78.5 HYPERLIPIDEMIA, UNSPECIFIED HYPERLIPIDEMIA TYPE: ICD-10-CM

## 2022-01-28 DIAGNOSIS — I25.84 CORONARY ARTERY CALCIFICATION: ICD-10-CM

## 2022-01-28 DIAGNOSIS — I10 PRIMARY HYPERTENSION: ICD-10-CM

## 2022-01-28 DIAGNOSIS — I25.10 CORONARY ARTERY CALCIFICATION: ICD-10-CM

## 2022-01-28 DIAGNOSIS — F17.200 SMOKING ADDICTION: ICD-10-CM

## 2022-01-28 NOTE — TELEPHONE ENCOUNTER
Received a call from stress test stating the stress test was not completed. They were unable to obtain IV access and the patient was not interested in another IV attempt and refused to have the test completed. Please call patient on Monday to see if he is willing to reschedule the stress test. If so please reschedule.

## 2022-01-31 NOTE — TELEPHONE ENCOUNTER
Discussed results with Dr. Gagnon Slider. Carotid duplex normal and abdominal US unable to visualize all due to bowel gas but no need to repeat. Spoke with Atilio regarding results and he v/u.

## 2022-01-31 NOTE — TELEPHONE ENCOUNTER
Called Aron this morning and he said he doesn't want to reschedule the stress test at this time. He asked if the results of his doppler was back?      You can reach Atilio at #[phone]

## 2022-02-01 ENCOUNTER — TELEPHONE (OUTPATIENT)
Dept: CASE MANAGEMENT | Age: 67
End: 2022-02-01

## 2022-02-25 PROCEDURE — 93272 ECG/REVIEW INTERPRET ONLY: CPT | Performed by: INTERNAL MEDICINE

## 2022-02-28 ENCOUNTER — TELEPHONE (OUTPATIENT)
Dept: CARDIOLOGY CLINIC | Age: 67
End: 2022-02-28

## 2022-02-28 NOTE — TELEPHONE ENCOUNTER
Called patient with result message. Patient verbalized and confirmed understanding. He will call back when he checks his schedule to make appointment in July with Dr. Alphonse Holloway.

## 2022-02-28 NOTE — TELEPHONE ENCOUNTER
Dr. Corbin Anderson reviewed monitor results showing predominantly atrial fibrillation but heart rate is typically controlled. Continue current medical therapy including Eliquis 5 mg and diltiazem 240 mg daily. Follow up in July as recommended, patient is not currently scheduled for follow up.

## 2022-03-02 DIAGNOSIS — I48.0 PAROXYSMAL ATRIAL FIBRILLATION (HCC): ICD-10-CM

## 2022-03-15 ENCOUNTER — APPOINTMENT (OUTPATIENT)
Dept: GENERAL RADIOLOGY | Age: 67
DRG: 291 | End: 2022-03-15
Payer: COMMERCIAL

## 2022-03-15 ENCOUNTER — HOSPITAL ENCOUNTER (INPATIENT)
Age: 67
LOS: 6 days | Discharge: HOME | DRG: 291 | End: 2022-03-21
Attending: EMERGENCY MEDICINE | Admitting: INTERNAL MEDICINE
Payer: COMMERCIAL

## 2022-03-15 DIAGNOSIS — E83.42 HYPOMAGNESEMIA: ICD-10-CM

## 2022-03-15 DIAGNOSIS — E87.6 HYPOKALEMIA: ICD-10-CM

## 2022-03-15 DIAGNOSIS — R77.8 TROPONIN LEVEL ELEVATED: ICD-10-CM

## 2022-03-15 DIAGNOSIS — I51.7 CARDIOMEGALY: ICD-10-CM

## 2022-03-15 DIAGNOSIS — I48.91 ATRIAL FIBRILLATION, UNSPECIFIED TYPE (HCC): Primary | ICD-10-CM

## 2022-03-15 LAB
ANION GAP SERPL CALCULATED.3IONS-SCNC: 18 MMOL/L (ref 3–16)
BASE EXCESS VENOUS: 13.4 MMOL/L
BASOPHILS ABSOLUTE: 0.1 K/UL (ref 0–0.2)
BASOPHILS RELATIVE PERCENT: 0.7 %
BUN BLDV-MCNC: 12 MG/DL (ref 7–20)
CALCIUM SERPL-MCNC: 9 MG/DL (ref 8.3–10.6)
CARBOXYHEMOGLOBIN: 3.3 %
CHLORIDE BLD-SCNC: 92 MMOL/L (ref 99–110)
CO2: 30 MMOL/L (ref 21–32)
CREAT SERPL-MCNC: 1.1 MG/DL (ref 0.8–1.3)
EOSINOPHILS ABSOLUTE: 0.1 K/UL (ref 0–0.6)
EOSINOPHILS RELATIVE PERCENT: 0.6 %
GFR AFRICAN AMERICAN: >60
GFR NON-AFRICAN AMERICAN: >60
GLUCOSE BLD-MCNC: 92 MG/DL (ref 70–99)
HCO3 VENOUS: 39 MMOL/L (ref 23–29)
HCT VFR BLD CALC: 48.4 % (ref 40.5–52.5)
HEMOGLOBIN: 16.7 G/DL (ref 13.5–17.5)
LACTIC ACID: 1.2 MMOL/L (ref 0.4–2)
LYMPHOCYTES ABSOLUTE: 1.1 K/UL (ref 1–5.1)
LYMPHOCYTES RELATIVE PERCENT: 10.5 %
MAGNESIUM: 1.3 MG/DL (ref 1.8–2.4)
MCH RBC QN AUTO: 33.5 PG (ref 26–34)
MCHC RBC AUTO-ENTMCNC: 34.5 G/DL (ref 31–36)
MCV RBC AUTO: 97.2 FL (ref 80–100)
METHEMOGLOBIN VENOUS: 0.3 %
MONOCYTES ABSOLUTE: 0.9 K/UL (ref 0–1.3)
MONOCYTES RELATIVE PERCENT: 8.3 %
NEUTROPHILS ABSOLUTE: 8.3 K/UL (ref 1.7–7.7)
NEUTROPHILS RELATIVE PERCENT: 79.9 %
O2 SAT, VEN: 87 %
O2 THERAPY: ABNORMAL
PCO2, VEN: 49.9 MMHG (ref 40–50)
PDW BLD-RTO: 14.6 % (ref 12.4–15.4)
PH VENOUS: 7.5 (ref 7.35–7.45)
PLATELET # BLD: 351 K/UL (ref 135–450)
PMV BLD AUTO: 8.9 FL (ref 5–10.5)
PO2, VEN: 51 MMHG
POTASSIUM REFLEX MAGNESIUM: 2.8 MMOL/L (ref 3.5–5.1)
PRO-BNP: 6697 PG/ML (ref 0–124)
RBC # BLD: 4.98 M/UL (ref 4.2–5.9)
SODIUM BLD-SCNC: 140 MMOL/L (ref 136–145)
TCO2 CALC VENOUS: 41 MMOL/L
TROPONIN: 0.04 NG/ML
WBC # BLD: 10.5 K/UL (ref 4–11)

## 2022-03-15 PROCEDURE — 96376 TX/PRO/DX INJ SAME DRUG ADON: CPT

## 2022-03-15 PROCEDURE — 6360000002 HC RX W HCPCS: Performed by: INTERNAL MEDICINE

## 2022-03-15 PROCEDURE — 71046 X-RAY EXAM CHEST 2 VIEWS: CPT

## 2022-03-15 PROCEDURE — 93005 ELECTROCARDIOGRAM TRACING: CPT | Performed by: EMERGENCY MEDICINE

## 2022-03-15 PROCEDURE — 85025 COMPLETE CBC W/AUTO DIFF WBC: CPT

## 2022-03-15 PROCEDURE — 84484 ASSAY OF TROPONIN QUANT: CPT

## 2022-03-15 PROCEDURE — 6360000002 HC RX W HCPCS: Performed by: EMERGENCY MEDICINE

## 2022-03-15 PROCEDURE — 99283 EMERGENCY DEPT VISIT LOW MDM: CPT

## 2022-03-15 PROCEDURE — 83735 ASSAY OF MAGNESIUM: CPT

## 2022-03-15 PROCEDURE — 2580000003 HC RX 258: Performed by: EMERGENCY MEDICINE

## 2022-03-15 PROCEDURE — 96365 THER/PROPH/DIAG IV INF INIT: CPT

## 2022-03-15 PROCEDURE — 83605 ASSAY OF LACTIC ACID: CPT

## 2022-03-15 PROCEDURE — 2500000003 HC RX 250 WO HCPCS: Performed by: EMERGENCY MEDICINE

## 2022-03-15 PROCEDURE — 1200000000 HC SEMI PRIVATE

## 2022-03-15 PROCEDURE — 82803 BLOOD GASES ANY COMBINATION: CPT

## 2022-03-15 PROCEDURE — 80048 BASIC METABOLIC PNL TOTAL CA: CPT

## 2022-03-15 PROCEDURE — 83880 ASSAY OF NATRIURETIC PEPTIDE: CPT

## 2022-03-15 PROCEDURE — 96375 TX/PRO/DX INJ NEW DRUG ADDON: CPT

## 2022-03-15 PROCEDURE — 36415 COLL VENOUS BLD VENIPUNCTURE: CPT

## 2022-03-15 RX ORDER — MAGNESIUM SULFATE HEPTAHYDRATE 500 MG/ML
2000 INJECTION, SOLUTION INTRAMUSCULAR; INTRAVENOUS ONCE
Status: DISCONTINUED | OUTPATIENT
Start: 2022-03-15 | End: 2022-03-15 | Stop reason: SDUPTHER

## 2022-03-15 RX ORDER — FUROSEMIDE 10 MG/ML
20 INJECTION INTRAMUSCULAR; INTRAVENOUS 2 TIMES DAILY
Status: DISCONTINUED | OUTPATIENT
Start: 2022-03-16 | End: 2022-03-17

## 2022-03-15 RX ORDER — 0.9 % SODIUM CHLORIDE 0.9 %
500 INTRAVENOUS SOLUTION INTRAVENOUS ONCE
Status: DISCONTINUED | OUTPATIENT
Start: 2022-03-15 | End: 2022-03-17

## 2022-03-15 RX ORDER — POTASSIUM CHLORIDE 7.45 MG/ML
40 INJECTION INTRAVENOUS ONCE
Status: DISCONTINUED | OUTPATIENT
Start: 2022-03-15 | End: 2022-03-15

## 2022-03-15 RX ORDER — POTASSIUM CHLORIDE 7.45 MG/ML
10 INJECTION INTRAVENOUS PRN
Status: DISCONTINUED | OUTPATIENT
Start: 2022-03-15 | End: 2022-03-17

## 2022-03-15 RX ORDER — DILTIAZEM HYDROCHLORIDE 5 MG/ML
10 INJECTION INTRAVENOUS ONCE
Status: COMPLETED | OUTPATIENT
Start: 2022-03-15 | End: 2022-03-15

## 2022-03-15 RX ORDER — EZETIMIBE 10 MG/1
10 TABLET ORAL DAILY
Status: DISCONTINUED | OUTPATIENT
Start: 2022-03-16 | End: 2022-03-21 | Stop reason: HOSPADM

## 2022-03-15 RX ORDER — BUDESONIDE AND FORMOTEROL FUMARATE DIHYDRATE 160; 4.5 UG/1; UG/1
2 AEROSOL RESPIRATORY (INHALATION) 2 TIMES DAILY
Status: DISCONTINUED | OUTPATIENT
Start: 2022-03-15 | End: 2022-03-21 | Stop reason: HOSPADM

## 2022-03-15 RX ORDER — MAGNESIUM SULFATE IN WATER 40 MG/ML
2000 INJECTION, SOLUTION INTRAVENOUS ONCE
Status: COMPLETED | OUTPATIENT
Start: 2022-03-15 | End: 2022-03-15

## 2022-03-15 RX ORDER — ATORVASTATIN CALCIUM 20 MG/1
20 TABLET, FILM COATED ORAL DAILY
Status: DISCONTINUED | OUTPATIENT
Start: 2022-03-16 | End: 2022-03-16

## 2022-03-15 RX ORDER — POTASSIUM CHLORIDE 7.45 MG/ML
10 INJECTION INTRAVENOUS
Status: COMPLETED | OUTPATIENT
Start: 2022-03-15 | End: 2022-03-15

## 2022-03-15 RX ORDER — PANTOPRAZOLE SODIUM 40 MG/1
40 TABLET, DELAYED RELEASE ORAL
Status: DISCONTINUED | OUTPATIENT
Start: 2022-03-16 | End: 2022-03-21 | Stop reason: HOSPADM

## 2022-03-15 RX ORDER — ASPIRIN 81 MG/1
81 TABLET, CHEWABLE ORAL DAILY
Status: DISCONTINUED | OUTPATIENT
Start: 2022-03-16 | End: 2022-03-19

## 2022-03-15 RX ORDER — PREDNISONE 20 MG/1
40 TABLET ORAL DAILY
Status: DISCONTINUED | OUTPATIENT
Start: 2022-03-16 | End: 2022-03-19

## 2022-03-15 RX ORDER — IPRATROPIUM BROMIDE AND ALBUTEROL SULFATE 2.5; .5 MG/3ML; MG/3ML
1 SOLUTION RESPIRATORY (INHALATION) ONCE
Status: COMPLETED | OUTPATIENT
Start: 2022-03-15 | End: 2022-03-16

## 2022-03-15 RX ADMIN — POTASSIUM CHLORIDE 10 MEQ: 7.46 INJECTION, SOLUTION INTRAVENOUS at 22:28

## 2022-03-15 RX ADMIN — DILTIAZEM HYDROCHLORIDE 5 MG/HR: 5 INJECTION INTRAVENOUS at 19:32

## 2022-03-15 RX ADMIN — POTASSIUM CHLORIDE 10 MEQ: 7.46 INJECTION, SOLUTION INTRAVENOUS at 20:08

## 2022-03-15 RX ADMIN — Medication 500 ML: at 19:01

## 2022-03-15 RX ADMIN — MAGNESIUM SULFATE HEPTAHYDRATE 2000 MG: 40 INJECTION, SOLUTION INTRAVENOUS at 20:21

## 2022-03-15 RX ADMIN — DILTIAZEM HYDROCHLORIDE 10 MG: 5 INJECTION INTRAVENOUS at 19:01

## 2022-03-15 RX ADMIN — POTASSIUM CHLORIDE 10 MEQ: 7.46 INJECTION, SOLUTION INTRAVENOUS at 21:22

## 2022-03-15 RX ADMIN — POTASSIUM CHLORIDE 10 MEQ: 7.46 INJECTION, SOLUTION INTRAVENOUS at 19:01

## 2022-03-15 ASSESSMENT — ENCOUNTER SYMPTOMS
SORE THROAT: 0
EYE REDNESS: 0
COUGH: 1
ABDOMINAL PAIN: 0
RHINORRHEA: 0
SHORTNESS OF BREATH: 1

## 2022-03-15 ASSESSMENT — PAIN SCALES - GENERAL: PAINLEVEL_OUTOF10: 0

## 2022-03-15 NOTE — ED PROVIDER NOTES
[facility]  eMERGENCY dEPARTMENT eNCOUnter      Pt Name: Ni Peterson  MRN: [id number]  [place] 1955  Date of evaluation: 3/15/2022  Provider: Melody Patel MD    CHIEF COMPLAINT       Chief Complaint   Patient presents with    Cough     pt seen at PCP today. c/o BLE edema. history of afib.  pt on eloquis. SOB for about 2 weeks. denies fever. HISTORY OF PRESENT ILLNESS   (Location/Symptom, Timing/Onset,Context/Setting, Quality, Duration, Modifying Factors, Severity)  Note limiting factors. Ni Peterson is a 73 y.o. male who presents to the emergency department with shortness of breath. This patient has a past medical history of COPD, chronic pain syndrome, tobacco use, atrial fibrillation for which he takes Eliquis and has been compliant with this. He states that for the past 2 weeks he has had progressively worsening shortness of breath with associated bilateral lower extremity edema. He denies any fever or chills. He states he occasionally coughs but not any more than normal.  No fevers. No known sick contacts. No chest pain. No belly pain. HPI    NursingNotes were reviewed. REVIEW OF SYSTEMS    (2-9 systems for level 4, 10 or more for level 5)     Review of Systems   Constitutional: Negative for fever. HENT: Negative for rhinorrhea and sore throat. Eyes: Negative for redness. Respiratory: Positive for cough and shortness of breath. Cardiovascular: Negative for chest pain. Gastrointestinal: Negative for abdominal pain. Genitourinary: Negative for flank pain. Skin: Negative for rash. Neurological: Negative for headaches. Hematological: Negative for adenopathy. Psychiatric/Behavioral: Negative for confusion. Except as noted above the remainder of the review of systems was reviewed and negative.        PAST MEDICAL HISTORY     Past Medical History:   Diagnosis Date    Chronic pain syndrome  Chronic pain syndrome     COPD (chronic obstructive pulmonary disease) ([facility]) 6/5/2012    DDD (degenerative disc disease), lumbar     Depression 6/5/2012    Diverticulitis 6/5/2012    DJD (degenerative joint disease) of knee     Foraminal stenosis of lumbar region     LORIE (generalized anxiety disorder) 6/5/2012    GERD (gastroesophageal reflux disease) 6/5/2012    HTN (hypertension) 6/5/2012    Incidental lung nodule     Insomnia     Lumbar radiculitis     Neuropathy 6/5/2012    Nontraumatic rupture of tendons of biceps (long head) 6/5/2012    Numbness and tingling of both legs     Pernicious anemia 6/5/2012    Sciatica     Sprain and strain of unspecified site of elbow and forearm 6/5/2012         SURGICALHISTORY       Past Surgical History:   Procedure Laterality Date    MUSCLE REPAIR Bilateral 8-10 years ago    Pulled bilateral biceps off bone from lifting weights.          CURRENT MEDICATIONS       Previous Medications    ALBUTEROL SULFATE HFA (VENTOLIN HFA) 108 (90 BASE) MCG/ACT INHALER    Inhale 2 puffs into the lungs 4 times daily as needed for Wheezing    APIXABAN (ELIQUIS) 5 MG TABS TABLET    Take 1 tablet by mouth 2 times daily    ASPIRIN 81 MG CHEWABLE TABLET    Take 81 mg by mouth daily    BUDESONIDE-FORMOTEROL (SYMBICORT) 160-4.5 MCG/ACT AERO    Inhale 2 puffs into the lungs 2 times daily    DILTIAZEM (TIAZAC) 240 MG EXTENDED RELEASE CAPSULE    TAKE ONE CAPSULE BY MOUTH DAILY    EZETIMIBE (ZETIA) 10 MG TABLET    Take 1 tablet by mouth daily    HYDROCHLOROTHIAZIDE (HYDRODIURIL) 25 MG TABLET    TAKE 1 TABLET BY MOUTH  DAILY    OMEPRAZOLE (PRILOSEC) 40 MG DELAYED RELEASE CAPSULE    1 tab po daily    PREDNISONE (DELTASONE) 20 MG TABLET    2 tab po daily for 4 days then 1 tab po daily for 3 days       ALLERGIES     Lodine [etodolac], Neurontin [gabapentin], Pregabalin, Vioxx, and Wellbutrin [bupropion hcl]    FAMILY HISTORY       Family History   Problem Relation Age of Onset    Arthritis Father     Other Father         GERD    Heart Disease Father         had heart attack          SOCIAL HISTORY       Social History     Socioeconomic History    Marital status:      Spouse name: Maria Teresa Bynum Number of children: 2    Years of education: Not on file    Highest education level: Not on file   Occupational History    Occupation: Maintenance     Employer: [employer]   Tobacco Use    Smoking status: Current Every Day Smoker     Packs/day: 1.50     Years: 50.00     Pack years: 75.00     Types: Cigarettes    Smokeless tobacco: Never Used    Tobacco comment: pt smokes 1 pk and a half a day    Vaping Use    Vaping Use: Never used   Substance and Sexual Activity    Alcohol use: No     Comment: no longer drinks    Drug use: Yes     Types: Marijuana Deboraha Sanes)     Comment: daily use, \"like a cocktail\"    Sexual activity: Yes     Partners: Female   Other Topics Concern    Not on file   Social History Narrative    Not on file     Social Determinants of Health     Financial Resource Strain:     Difficulty of Paying Living Expenses: Not on file   Food Insecurity:     Worried About Running Out of Food in the Last Year: Not on file    Moni of Food in the Last Year: Not on file   Transportation Needs:     Lack of Transportation (Medical): Not on file    Lack of Transportation (Non-Medical):  Not on file   Physical Activity:     Days of Exercise per Week: Not on file    Minutes of Exercise per Session: Not on file   Stress:     Feeling of Stress : Not on file   Social Connections:     Frequency of Communication with Friends and Family: Not on file    Frequency of Social Gatherings with Friends and Family: Not on file    Attends Orthodoxy Services: Not on file    Active Member of Clubs or Organizations: Not on file    Attends Club or Organization Meetings: Not on file    Marital Status: Not on file   Intimate Partner Violence:     Fear of Current or Ex-Partner: Not on file    Emotionally Abused: Not on file    Physically Abused: Not on file    Sexually Abused: Not on file   Housing Stability:     Unable to Pay for Housing in the Last Year: Not on file    Number of [place] in the Last Year: Not on file    Unstable Housing in the Last Year: Not on file       SCREENINGS    Alejandro Coma Scale  Eye Opening: Spontaneous  Best Verbal Response: Oriented  Best Motor Response: Obeys commands  Alejandro Coma Scale Score: 15        PHYSICAL EXAM    (up to 7 for level 4, 8 or more for level 5)     ED Triage Vitals   BP Temp Temp src Pulse Resp SpO2 Height Weight   -- -- -- -- -- -- -- --       Physical Exam  Vitals and nursing note reviewed. Constitutional:       Appearance: He is well-developed. He is not diaphoretic. HENT:      Head: Normocephalic and atraumatic. Mouth/Throat:      Mouth: Mucous membranes are moist.   Eyes:      General:         Right eye: No discharge. Left eye: No discharge. Conjunctiva/sclera: Conjunctivae normal.   Cardiovascular:      Rate and Rhythm: Normal rate. Rhythm irregular. Heart sounds: No murmur heard. Pulmonary:      Effort: Pulmonary effort is normal. No respiratory distress. Abdominal:      Palpations: Abdomen is soft. Tenderness: There is no abdominal tenderness. There is no guarding or rebound. Musculoskeletal:         General: Normal range of motion. Cervical back: Neck supple. Comments: Bilateral pitting edema to the knees. Skin:     General: Skin is warm and dry. Capillary Refill: Capillary refill takes less than 2 seconds. Neurological:      Mental Status: He is alert and oriented to person, place, and time.    Psychiatric:         Behavior: Behavior normal.         DIAGNOSTIC RESULTS     EKG: All EKG's are interpreted by the Emergency Department Physician who either signs or Co-signsthis chart in the absence of a cardiologist.    The Ekg interpreted by me shows  atrial fibrillation with a rate of 129  Axis is   Left axis deviation  QTc is  480 msec  Intervals and Durations are unremarkable. ST Segments: nonspecific changes  Prior to an EKG performed on June 12, 2019 sinus bradycardia has been replaced with atrial fibrillation with rapid ventricular response. RADIOLOGY:   Non-plain filmimages such as CT, Ultrasound and MRI are read by the radiologist. Plain radiographic images are visualized and preliminarily interpreted by the emergency physician with the below findings:        Interpretation per the Radiologist below, if available at the time ofthis note:    XR CHEST (2 VW)   Final Result   No acute cardiopulmonary disease. Cardiomegaly without overt failure.                ED BEDSIDE ULTRASOUND:   Performed by ED Physician - none    LABS:  Labs Reviewed   CBC WITH AUTO DIFFERENTIAL - Abnormal; Notable for the following components:       Result Value    Neutrophils Absolute 8.3 (*)     All other components within normal limits   BASIC METABOLIC PANEL W/ REFLEX TO MG FOR LOW K - Abnormal; Notable for the following components:    Potassium reflex Magnesium 2.8 (*)     Chloride 92 (*)     Anion Gap 18 (*)     All other components within normal limits    Narrative:     Yury Reed tel. [phone],  Chemistry results called to and read back by [facility], RN, 03/15/2022 18:46, by  DIANE ENGLISH   TROPONIN - Abnormal; Notable for the following components:    Troponin 0.04 (*)     All other components within normal limits    Narrative:     Yury Reed tel. [phone],  Chemistry results called to and read back by [facility], RN, 03/15/2022 18:46, by  Marylu Ferrell - Abnormal; Notable for the following components:    Pro-BNP 6,697 (*)     All other components within normal limits    Narrative:     Yury Reed tel. [phone],  Chemistry results called to and read back by [facility], RN, 03/15/2022 18:46, by  Elyssa 1, VENOUS - Abnormal; Notable for the following components:    pH, Spencer 7.501 (*)     HCO3, Venous 39 (*)     All other components within normal limits   MAGNESIUM - Abnormal; Notable for the following components:    Magnesium 1.30 (*)     All other components within normal limits    Narrative:     Anoop Nix tel. [phone],  Chemistry results called to and read back by [facility], RN, 03/15/2022 18:46, by  OQFIK   LACTIC ACID       All other labs were within normal range or not returned as of this dictation. EMERGENCY DEPARTMENT COURSE and DIFFERENTIAL DIAGNOSIS/MDM:   Vitals:    Vitals:    03/15/22 1745   BP: 134/85   Pulse: 136   Resp: 26   Temp: 98 °F (36.7 °C)   TempSrc: Tympanic   SpO2: 93%   Weight: 193 lb 8 oz (87.8 kg)           MDM  Number of Diagnoses or Management Options  Atrial fibrillation, unspecified type (HCC)  Cardiomegaly  Hypokalemia  Hypomagnesemia  Troponin level elevated  Diagnosis management comments: DDX: CHF, COPD, pneumonia, Pleural effusion, other    Work-up of the patient reveals magnesium 1.3, lactic acid is normal, proBNP of 6697, troponin 0 0.04, potassium 2.8, anion gap 18, CBC unremarkable. Chest x-ray with cardiomegaly without overt failure. At this time I feel the patient has atrial fibrillation with rapid ventricular response with hypomagnesemia and hypokalemia. I feel he has some early heart failure most likely from his A. fib with RVR and an abnormal troponin. I spoken to Dr. Gabby Zaragoza he had 79 4 PM about this patient. He has been started on diltiazem drip. She is excepting of his care at this time and will admit the patient to her service. CRITICAL CARE TIME   Total Critical Care time was 0 minutes, excluding separately reportable procedures. There was a high probability of clinically significant/life threatening deterioration in the patient's condition which required my urgent intervention.       CONSULTS:  IP CONSULT TO INTERNAL MEDICINE    PROCEDURES:  Unless otherwise noted below, none Procedures    FINAL IMPRESSION      1. Atrial fibrillation, unspecified type ([address]    2. Cardiomegaly    3. Hypomagnesemia    4. Hypokalemia    5. Troponin level elevated          DISPOSITION/PLAN   DISPOSITION Admitted 03/15/2022 07:40:38 PM      PATIENT REFERRED TO:  No follow-up provider specified.     DISCHARGE MEDICATIONS:  New Prescriptions    No medications on file          (Please note that portions of this note were completed with a voice recognition program.Efforts were made to edit the dictations but occasionally words are mis-transcribed.)    Jessica Castro MD (electronically signed)  Attending Emergency Physician          Jessica Castro MD  03/15/22 [id number]

## 2022-03-16 LAB
ALBUMIN SERPL-MCNC: 3.6 G/DL (ref 3.4–5)
ANION GAP SERPL CALCULATED.3IONS-SCNC: 15 MMOL/L (ref 3–16)
BASOPHILS ABSOLUTE: 0 K/UL (ref 0–0.2)
BASOPHILS RELATIVE PERCENT: 0.4 %
BUN BLDV-MCNC: 12 MG/DL (ref 7–20)
CALCIUM SERPL-MCNC: 9 MG/DL (ref 8.3–10.6)
CHLORIDE BLD-SCNC: 93 MMOL/L (ref 99–110)
CO2: 30 MMOL/L (ref 21–32)
CREAT SERPL-MCNC: 1 MG/DL (ref 0.8–1.3)
EOSINOPHILS ABSOLUTE: 0 K/UL (ref 0–0.6)
EOSINOPHILS RELATIVE PERCENT: 0.3 %
GFR AFRICAN AMERICAN: >60
GFR NON-AFRICAN AMERICAN: >60
GLUCOSE BLD-MCNC: 109 MG/DL (ref 70–99)
HCT VFR BLD CALC: 47.4 % (ref 40.5–52.5)
HEMOGLOBIN: 15.8 G/DL (ref 13.5–17.5)
LYMPHOCYTES ABSOLUTE: 1 K/UL (ref 1–5.1)
LYMPHOCYTES RELATIVE PERCENT: 10.6 %
MAGNESIUM: 1.9 MG/DL (ref 1.8–2.4)
MCH RBC QN AUTO: 32.7 PG (ref 26–34)
MCHC RBC AUTO-ENTMCNC: 33.4 G/DL (ref 31–36)
MCV RBC AUTO: 97.8 FL (ref 80–100)
MONOCYTES ABSOLUTE: 0.7 K/UL (ref 0–1.3)
MONOCYTES RELATIVE PERCENT: 7.1 %
NEUTROPHILS ABSOLUTE: 7.8 K/UL (ref 1.7–7.7)
NEUTROPHILS RELATIVE PERCENT: 81.6 %
PDW BLD-RTO: 14.7 % (ref 12.4–15.4)
PHOSPHORUS: 2.9 MG/DL (ref 2.5–4.9)
PLATELET # BLD: 324 K/UL (ref 135–450)
PMV BLD AUTO: 9.1 FL (ref 5–10.5)
POTASSIUM SERPL-SCNC: 2.6 MMOL/L (ref 3.5–5.1)
POTASSIUM SERPL-SCNC: 3 MMOL/L (ref 3.5–5.1)
RBC # BLD: 4.84 M/UL (ref 4.2–5.9)
SODIUM BLD-SCNC: 138 MMOL/L (ref 136–145)
TROPONIN: 0.03 NG/ML
WBC # BLD: 9.6 K/UL (ref 4–11)

## 2022-03-16 PROCEDURE — 94640 AIRWAY INHALATION TREATMENT: CPT

## 2022-03-16 PROCEDURE — 2060000000 HC ICU INTERMEDIATE R&B

## 2022-03-16 PROCEDURE — 2580000003 HC RX 258: Performed by: EMERGENCY MEDICINE

## 2022-03-16 PROCEDURE — 85025 COMPLETE CBC W/AUTO DIFF WBC: CPT

## 2022-03-16 PROCEDURE — 2500000003 HC RX 250 WO HCPCS: Performed by: NURSE PRACTITIONER

## 2022-03-16 PROCEDURE — 84484 ASSAY OF TROPONIN QUANT: CPT

## 2022-03-16 PROCEDURE — 6360000002 HC RX W HCPCS: Performed by: INTERNAL MEDICINE

## 2022-03-16 PROCEDURE — 94760 N-INVAS EAR/PLS OXIMETRY 1: CPT

## 2022-03-16 PROCEDURE — 2700000000 HC OXYGEN THERAPY PER DAY

## 2022-03-16 PROCEDURE — 83735 ASSAY OF MAGNESIUM: CPT

## 2022-03-16 PROCEDURE — 94761 N-INVAS EAR/PLS OXIMETRY MLT: CPT

## 2022-03-16 PROCEDURE — 6370000000 HC RX 637 (ALT 250 FOR IP): Performed by: INTERNAL MEDICINE

## 2022-03-16 PROCEDURE — 80069 RENAL FUNCTION PANEL: CPT

## 2022-03-16 PROCEDURE — 36415 COLL VENOUS BLD VENIPUNCTURE: CPT

## 2022-03-16 PROCEDURE — 2500000003 HC RX 250 WO HCPCS: Performed by: EMERGENCY MEDICINE

## 2022-03-16 PROCEDURE — 99222 1ST HOSP IP/OBS MODERATE 55: CPT | Performed by: INTERNAL MEDICINE

## 2022-03-16 PROCEDURE — 99223 1ST HOSP IP/OBS HIGH 75: CPT | Performed by: INTERNAL MEDICINE

## 2022-03-16 PROCEDURE — 84132 ASSAY OF SERUM POTASSIUM: CPT

## 2022-03-16 RX ORDER — FUROSEMIDE 10 MG/ML
40 INJECTION INTRAMUSCULAR; INTRAVENOUS ONCE
Status: DISCONTINUED | OUTPATIENT
Start: 2022-03-16 | End: 2022-03-16 | Stop reason: CLARIF

## 2022-03-16 RX ORDER — POTASSIUM CHLORIDE 20 MEQ/1
40 TABLET, EXTENDED RELEASE ORAL ONCE
Status: COMPLETED | OUTPATIENT
Start: 2022-03-16 | End: 2022-03-16

## 2022-03-16 RX ORDER — METOPROLOL TARTRATE 5 MG/5ML
5 INJECTION INTRAVENOUS ONCE
Status: COMPLETED | OUTPATIENT
Start: 2022-03-16 | End: 2022-03-16

## 2022-03-16 RX ORDER — DILTIAZEM HYDROCHLORIDE 240 MG/1
240 CAPSULE, COATED, EXTENDED RELEASE ORAL DAILY
Status: DISCONTINUED | OUTPATIENT
Start: 2022-03-16 | End: 2022-03-17

## 2022-03-16 RX ADMIN — FUROSEMIDE 20 MG: 10 INJECTION, SOLUTION INTRAMUSCULAR; INTRAVENOUS at 08:56

## 2022-03-16 RX ADMIN — ASPIRIN 81 MG 81 MG: 81 TABLET ORAL at 08:56

## 2022-03-16 RX ADMIN — PREDNISONE 40 MG: 20 TABLET ORAL at 08:56

## 2022-03-16 RX ADMIN — DILTIAZEM HYDROCHLORIDE 15 MG/HR: 5 INJECTION INTRAVENOUS at 04:51

## 2022-03-16 RX ADMIN — FUROSEMIDE 20 MG: 10 INJECTION, SOLUTION INTRAMUSCULAR; INTRAVENOUS at 18:13

## 2022-03-16 RX ADMIN — PANTOPRAZOLE SODIUM 40 MG: 40 TABLET, DELAYED RELEASE ORAL at 06:15

## 2022-03-16 RX ADMIN — APIXABAN 5 MG: 5 TABLET, FILM COATED ORAL at 08:56

## 2022-03-16 RX ADMIN — APIXABAN 5 MG: 5 TABLET, FILM COATED ORAL at 20:25

## 2022-03-16 RX ADMIN — BUDESONIDE AND FORMOTEROL FUMARATE DIHYDRATE 2 PUFF: 160; 4.5 AEROSOL RESPIRATORY (INHALATION) at 00:02

## 2022-03-16 RX ADMIN — METOPROLOL TARTRATE 5 MG: 5 INJECTION INTRAVENOUS at 00:32

## 2022-03-16 RX ADMIN — DILTIAZEM HYDROCHLORIDE 15 MG/HR: 5 INJECTION INTRAVENOUS at 15:25

## 2022-03-16 RX ADMIN — DILTIAZEM HYDROCHLORIDE 15 MG/HR: 5 INJECTION INTRAVENOUS at 22:47

## 2022-03-16 RX ADMIN — BUDESONIDE AND FORMOTEROL FUMARATE DIHYDRATE 2 PUFF: 160; 4.5 AEROSOL RESPIRATORY (INHALATION) at 20:21

## 2022-03-16 RX ADMIN — BUDESONIDE AND FORMOTEROL FUMARATE DIHYDRATE 2 PUFF: 160; 4.5 AEROSOL RESPIRATORY (INHALATION) at 08:05

## 2022-03-16 RX ADMIN — DILTIAZEM HYDROCHLORIDE 240 MG: 240 CAPSULE, COATED, EXTENDED RELEASE ORAL at 22:07

## 2022-03-16 RX ADMIN — APIXABAN 5 MG: 5 TABLET, FILM COATED ORAL at 00:32

## 2022-03-16 RX ADMIN — EZETIMIBE 10 MG: 10 TABLET ORAL at 08:56

## 2022-03-16 RX ADMIN — IPRATROPIUM BROMIDE AND ALBUTEROL SULFATE 1 AMPULE: 2.5; .5 SOLUTION RESPIRATORY (INHALATION) at 00:01

## 2022-03-16 RX ADMIN — POTASSIUM CHLORIDE 40 MEQ: 20 TABLET, EXTENDED RELEASE ORAL at 16:20

## 2022-03-16 ASSESSMENT — PAIN SCALES - GENERAL
PAINLEVEL_OUTOF10: 0

## 2022-03-16 NOTE — PROGRESS NOTES
Pt with afib rvr upon arrival on unit called dr Madonna Davis and Mary tucker see new orders see flowsheet pt informed

## 2022-03-16 NOTE — H&P
Hospital Medicine History & Physical    Patient:  Ni Peterson  YOB: 1955  Date of Service: 3/16/22  MRN: [id number]    PCP: Nancylee Lefort, MD    Date of Admission: 3/15/2022      Chief Complaint:    Chief Complaint   Patient presents with    Cough     pt seen at PCP today. c/o BLE edema. history of afib.  pt on eloquis. SOB for about 2 weeks. denies fever. History Of Present Illness: The patient is a 73 y.o. male who presents to [facility] with c/o as above  Still is short of breath  But better  Diagnosed with copd exacerbation, afib with RVR, fluid overload  Also has electrolyte    Past Medical History:        Diagnosis Date    Chronic pain syndrome     Chronic pain syndrome     COPD (chronic obstructive pulmonary disease) ([address] 6/5/2012    DDD (degenerative disc disease), lumbar     Depression 6/5/2012    Diverticulitis 6/5/2012    DJD (degenerative joint disease) of knee     Foraminal stenosis of lumbar region     LORIE (generalized anxiety disorder) 6/5/2012    GERD (gastroesophageal reflux disease) 6/5/2012    HTN (hypertension) 6/5/2012    Incidental lung nodule     Insomnia     Lumbar radiculitis     Neuropathy 6/5/2012    Nontraumatic rupture of tendons of biceps (long head) 6/5/2012    Numbness and tingling of both legs     Pernicious anemia 6/5/2012    Sciatica     Sprain and strain of unspecified site of elbow and forearm 6/5/2012       Past Surgical History:        Procedure Laterality Date    MUSCLE REPAIR Bilateral 8-10 years ago    Pulled bilateral biceps off bone from lifting weights.        Family History:  Family History   Problem Relation Age of Onset    Arthritis Father     Other Father         GERD    Heart Disease Father         had heart attack       Medications Prior to Admission: Prior to Admission medications    Medication Sig Start Date End Date Taking?  Authorizing Provider   dilTIAZem (TIAZAC) 240 MG extended release capsule TAKE ONE CAPSULE BY MOUTH DAILY 3/7/22  Yes Gloria Moore MD   predniSONE (DELTASONE) 20 MG tablet 2 tab po daily for 4 days then 1 tab po daily for 3 days 2/21/22  Yes Gloria Moore MD   aspirin 81 MG chewable tablet Take 81 mg by mouth daily   Yes Historical Provider, MD   apixaban (ELIQUIS) 5 MG TABS tablet Take 1 tablet by mouth 2 times daily 1/21/22  Yes Lukas Sharma MD   ezetimibe (ZETIA) 10 MG tablet Take 1 tablet by mouth daily 1/21/22  Yes Lukas Sharma MD   omeprazole (PRILOSEC) 40 MG delayed release capsule 1 tab po daily 11/30/21  Yes Gloria Moore MD   hydroCHLOROthiazide (HYDRODIURIL) 25 MG tablet TAKE 1 TABLET BY MOUTH  DAILY 11/30/21  Yes Gloria Moore MD   albuterol sulfate HFA (VENTOLIN HFA) 108 (90 Base) MCG/ACT inhaler Inhale 2 puffs into the lungs 4 times daily as needed for Wheezing 11/30/21  Yes Gloria Moore MD   budesonide-formoterol (SYMBICORT) 160-4.5 MCG/ACT AERO Inhale 2 puffs into the lungs 2 times daily 11/23/20  Yes Gloria Moore MD       Allergies:  Atorvastatin-coenzyme q10, Lodine [etodolac], Neurontin [gabapentin], Pregabalin, Vioxx, and Wellbutrin [bupropion hcl]    Social History:    Social History     Socioeconomic History    Marital status:      Spouse name: Qamar Lala Number of children: 2    Years of education: Not on file    Highest education level: Not on file   Occupational History    Occupation: Maintenance     Employer: [employer]   Tobacco Use    Smoking status: Current Every Day Smoker     Packs/day: 1.50     Years: 50.00     Pack years: 75.00     Types: Cigarettes    Smokeless tobacco: Never Used    Tobacco comment: pt smokes 1 pk and a half a day    Vaping Use    Vaping Use: Never used   Substance and Sexual Activity    Alcohol use: No     Comment: no longer drinks    Drug use: Yes     Types: Marijuana Dominik Bauer     Comment: daily use, \"like a cocktail\"    Sexual activity: Yes     Partners: Female   Other Topics Concern    Not on file   Social History Narrative    Not on file     Social Determinants of Health     Financial Resource Strain:     Difficulty of Paying Living Expenses: Not on file   Food Insecurity:     Worried About Running Out of Food in the Last Year: Not on file    Moni of Food in the Last Year: Not on file   Transportation Needs:     Lack of Transportation (Medical): Not on file    Lack of Transportation (Non-Medical): Not on file   Physical Activity:     Days of Exercise per Week: Not on file    Minutes of Exercise per Session: Not on file   Stress:     Feeling of Stress : Not on file   Social Connections:     Frequency of Communication with Friends and Family: Not on file    Frequency of Social Gatherings with Friends and Family: Not on file    Attends Sikhism Services: Not on file    Active Member of [address] [place] [address] [employer] or Organizations: Not on file    Attends Club or Organization Meetings: Not on file    Marital Status: Not on file   Intimate Partner Violence:     Fear of Current or Ex-Partner: Not on file    Emotionally Abused: Not on file    Physically Abused: Not on file    Sexually Abused: Not on file   Housing Stability:     Unable to Pay for Housing in the Last Year: Not on file    Number of [place] in the Last Year: Not on file    Unstable Housing in the Last Year: Not on file       TOBACCO:   reports that he has been smoking cigarettes. He has a 75.00 pack-year smoking history. He has never used smokeless tobacco.  ETOH:   reports no history of alcohol use.     REVIEW OF SYSTEMS:    Vital: BP (!) 152/127   Pulse 127   Temp 97.8 °F (36.6 °C) (Oral)   Resp 26   Ht 5' 8\" (1.727 m)   Wt 193 lb 12.6 oz (87.9 kg)   SpO2 94%   BMI 29.46 kg/m²   Constitutional: no fever, no night sweats,  fatigue  Head: no headache, no head injury, no migranes. Eye: no blurring of vision, no double vision. Ears: no hearing difficulty, no tinnitus  Mouth/throat: no ulceration, dental caries, dysphagia  Lungs:  cough,  shortness of breath,  wheeze  CVS: no palpitation, no chest pain,shortness of breath  GI: no abdominal pain, no nausea , no vomiting, no constipation  ANTONIO: no dysuria, frequency and urgency, no hematuria, no kidney stones  Musculoskeletal:back pain  Endocrine: no polyuria, polydypsia, no cold or heat intolerence  Hematology: no anemia, no easy brusing or bleeding, no hx of clotting disorder  Dermatology: no skin rash, no eczema, no prurities,  Psychiatry: no depression, no anxiety,no panic attacks, no suicide ideation  Neurology: no syncope,       PHYSICAL EXAM:  General:  Awake, alert, not in distress. Appears to be stated age. HEENT: Atraumatic, normocephalic. PERRLA. EOM intact. Pink conjunctiva, anicteric sclera. Pink and moist oral mucosa. No lymphadenopathy. Trachea midline. Thyroid non palpable. Neck supple. No carotid bruit. No JVD. Chest: Bilateal air entry, Clear to auscultation, no wheezing, rhonchi or rales. Cardiovascular: RRR, , no murmur, click, rub or gallop. BL lower extremity edema. Pedal and posterior tibialis 2+. Abdomen: Soft, non tender to palpation. Active bowel sounds x 4 quadrants. Musculoskeletal: Limitation of the rom of the joints and LSS  SLR is positive on the affected side  No gross weak ness appreciated  CNS: Oriented to person, place and time. CXR:   XR CHEST (2 VW)   Final Result   No acute cardiopulmonary disease. Cardiomegaly without overt failure. EKG:  I have reviewed the EKG.     CBC   Recent Labs     03/15/22  1800 22  0728   WBC 10.5 9.6   HGB 16.7 15.8   HCT 48.4 47.4    324      RENAL  Recent Labs     03/15/22  1800 22  0728    138   K 2.8* 2.6*   CL 92* 93*   CO2 30 30   PHOS  --  2.9   BUN 12 12   CREATININE 1.1 1.0     LFT'S  No results for input(s): AST, ALT, ALB, BILIDIR, BILITOT, ALKPHOS in the last 72 hours. COAG  No results for input(s): INR in the last 72 hours. CARDIAC ENZYMES  Recent Labs     03/15/22  1800 03/16/22  0728   TROPONINI 0.04* 0.03*       U/A:    Lab Results   Component Value Date    COLORU DK YELLOW 06/12/2019    WBCUA 1 06/12/2019    RBCUA 2 06/12/2019    MUCUS Trace 03/07/2011    CLARITYU Clear 06/12/2019    SPECGRAV 1.021 06/12/2019    LEUKOCYTESUR Negative 06/12/2019    BLOODU Negative 06/12/2019    GLUCOSEU Negative 06/12/2019    GLUCOSEU NEGATIVE 01/20/2012       ABG  No results found for: JEC8EAE, BEART, F5ZEZRPG, PHART, THGBART, QNU9ZMQ, PO2ART, ZNS1ZHG        Active Hospital Problems    Diagnosis Date Noted   [facility]) [I48.91] 03/15/2022           ASSESSMENT/PLAN:  afib with RVR- on cardizem drip  Copd exacerbation- on hhn rx and steroids  Hypokalemia-replace  Hypo magnesemia-replace  Fluid overload-lasix IV  Pt is stable. Discussed with staff and pt about the plan. See the orders for further plan. Will proceed further acc to pt's progress.     Electronically signed by Raine Dubose MD on 3/16/2022 at 3:09 PM

## 2022-03-16 NOTE — ACP (ADVANCE CARE PLANNING)
Advance Care Planning     Advance Care Planning Activator (Inpatient)  Conversation Note      Date of ACP Conversation: 3/16/2022     Conversation Conducted with: Patient with Decision Making Capacity    ACP Activator: Monse Jimenez RN    Health Care Decision Maker:     Current Designated Health Care Decision Maker:     Primary Decision Maker: Sree Williamson - [phone]    Secondary Decision Maker: Rebecca Brown - [phone]    Today we documented Decision Maker(s) consistent with Legal Next of Kin hierarchy. Care Preferences    Ventilation: \"If you were in your present state of health and suddenly became very ill and were unable to breathe on your own, what would your preference be about the use of a ventilator (breathing machine) if it were available to you? \"      Would the patient desire the use of ventilator (breathing machine)?: yes    \"If your health worsens and it becomes clear that your chance of recovery is unlikely, what would your preference be about the use of a ventilator (breathing machine) if it were available to you? \"     Would the patient desire the use of ventilator (breathing machine)?: No      Resuscitation  \"CPR works best to restart the heart when there is a sudden event, like a heart attack, in someone who is otherwise healthy. Unfortunately, CPR does not typically restart the heart for people who have serious health conditions or who are very sick. \"    \"In the event your heart stopped as a result of an underlying serious health condition, would you want attempts to be made to restart your heart (answer \"yes\" for attempt to resuscitate) or would you prefer a natural death (answer \"no\" for do not attempt to resuscitate)? \" yes       [] Yes   [x] No   Educated Patient / Cherelle Peppert regarding differences between Advance Directives and portable DNR orders.     Length of ACP Conversation in minutes:  5 minutes    Conversation Outcomes:  [x] ACP discussion completed  [] Existing advance directive reviewed with patient; no changes to patient's previously recorded wishes  [] New Advance Directive completed  [] Portable Do Not Rescitate prepared for Provider review and signature  [] POLST/POST/MOLST/MOST prepared for Provider review and signature      Follow-up plan:    [] Schedule follow-up conversation to continue planning  [] Referred individual to Provider for additional questions/concerns   [] Advised patient/agent/surrogate to review completed ACP document and update if needed with changes in condition, patient preferences or care setting    [x] This note routed to one or more involved healthcare providers  Electronically signed by Benancio Meckel, RN Case Management [phone] on 3/16/2022 at 2:07 PM

## 2022-03-16 NOTE — PROGRESS NOTES
Pharmacy Medication Reconciliation Note     List of medications patient is currently taking is complete. Source of information: 1. Epic records  2. Talked to the patient at bedside. Notes regarding home medications:   1. Patient took all of his medications prior to arrival at   emergency department.     Denies taking any other OTC or herbal medications    Ciera Harris PharmD  3/15/2022  10:14 PM

## 2022-03-16 NOTE — PLAN OF CARE
Problem: Falls - Risk of:  Goal: Will remain free from falls  Description: Will remain free from falls  3/16/2022 1011 by Jennifer Ocasio RN  Outcome: Ongoing  Patient is wearing nonskid socks. Bed is locked, and in lowest position. Room is free of clutter. Call light is within reach and used appropriately. Fall risk assessed per protocol. Will continue to monitor.

## 2022-03-16 NOTE — PROGRESS NOTES
4 Eyes Skin Assessment     The patient is being assess for  Admission    I agree that 2 RN's have performed a thorough Head to Toe Skin Assessment on the patient. ALL assessment sites listed below have been assessed. Areas assessed by both nurses:   [x]   Head, Face, and Ears   [x]   Shoulders, Back, and Chest  [x]   Arms, Elbows, and Hands   [x]   Coccyx, Sacrum, and IschIum  [x]   Legs, Feet, and Heels        Does the Patient have Skin Breakdown?   No         Bart Prevention initiated:  No   Wound Care Orders initiated:  NA      [facility] nurse consulted for Pressure Injury (Stage 3,4, Unstageable, DTI, NWPT, and Complex wounds), New and Established Ostomies:  NA      Nurse 1 eSignature: Electronically signed by Kaylie Alvarez RN on [birth year] at 1:34 AM EDT    **SHARE this note so that the co-signing nurse is able to place an eSignature**     Nurse 2 eSignature: Electronically signed by Tigist Manley RN on 3/16/22 at 5:55 AM EDT

## 2022-03-16 NOTE — CARE COORDINATION
INITIAL CASE MANAGEMENT ASSESSMENT    Reviewed chart, met with patient to assess possible discharge needs. Explained Case Management role/services. Living Situation: Confirmed home address, lives in a 2 level house with wife, son & 2 nieces (pt/wife have custody); 3 steps w/ railing to enter    ADLs: Independent, shares homemaking duties with wife     DME: None    PT/OT Recs: Not ordered     Active Services: None     Transportation: Active      Medications: Uses [employer] in [place]. Patient tells me he has difficulty affording medications, says he uses GoodRx & Eliquis discount cards. Discussed [facility] ([facility]), patient declines me making a referral for prescription assistance. Patient accepted pamphlet to follow up w/ [facility] if needed. PCP: Juliann Clemens MD      HD/PD: n/a    PLAN/COMMENTS: Patient will return home with family. Denies home needs. Watch for home oxygen, currently on 2L. SW/CM provided contact information for patient or family to call with any questions. SW/CM will follow and assist as needed.   Electronically signed by Tom Mon RN Case Management [phone] on 3/16/2022 at 2:04 PM

## 2022-03-17 LAB
A/G RATIO: 1.1 (ref 1.1–2.2)
ALBUMIN SERPL-MCNC: 3.7 G/DL (ref 3.4–5)
ALP BLD-CCNC: 78 U/L (ref 40–129)
ALT SERPL-CCNC: 20 U/L (ref 10–40)
ANION GAP SERPL CALCULATED.3IONS-SCNC: 12 MMOL/L (ref 3–16)
AST SERPL-CCNC: 19 U/L (ref 15–37)
BILIRUB SERPL-MCNC: 0.6 MG/DL (ref 0–1)
BUN BLDV-MCNC: 18 MG/DL (ref 7–20)
CALCIUM SERPL-MCNC: 9.7 MG/DL (ref 8.3–10.6)
CHLORIDE BLD-SCNC: 91 MMOL/L (ref 99–110)
CO2: 34 MMOL/L (ref 21–32)
CREAT SERPL-MCNC: 1.4 MG/DL (ref 0.8–1.3)
EKG ATRIAL RATE: 109 BPM
EKG DIAGNOSIS: NORMAL
EKG Q-T INTERVAL: 328 MS
EKG QRS DURATION: 92 MS
EKG QTC CALCULATION (BAZETT): 480 MS
EKG R AXIS: -41 DEGREES
EKG T AXIS: 149 DEGREES
EKG VENTRICULAR RATE: 129 BPM
GFR AFRICAN AMERICAN: >60
GFR NON-AFRICAN AMERICAN: 51
GLUCOSE BLD-MCNC: 133 MG/DL (ref 70–99)
LV EF: 20 %
LVEF MODALITY: NORMAL
MAGNESIUM: 1.8 MG/DL (ref 1.8–2.4)
POTASSIUM SERPL-SCNC: 3.2 MMOL/L (ref 3.5–5.1)
SODIUM BLD-SCNC: 137 MMOL/L (ref 136–145)
TOTAL PROTEIN: 7 G/DL (ref 6.4–8.2)

## 2022-03-17 PROCEDURE — 6360000002 HC RX W HCPCS: Performed by: INTERNAL MEDICINE

## 2022-03-17 PROCEDURE — 6370000000 HC RX 637 (ALT 250 FOR IP): Performed by: INTERNAL MEDICINE

## 2022-03-17 PROCEDURE — 94761 N-INVAS EAR/PLS OXIMETRY MLT: CPT

## 2022-03-17 PROCEDURE — 83735 ASSAY OF MAGNESIUM: CPT

## 2022-03-17 PROCEDURE — 2500000003 HC RX 250 WO HCPCS: Performed by: EMERGENCY MEDICINE

## 2022-03-17 PROCEDURE — 99232 SBSQ HOSP IP/OBS MODERATE 35: CPT | Performed by: INTERNAL MEDICINE

## 2022-03-17 PROCEDURE — 2580000003 HC RX 258: Performed by: EMERGENCY MEDICINE

## 2022-03-17 PROCEDURE — 94640 AIRWAY INHALATION TREATMENT: CPT

## 2022-03-17 PROCEDURE — 93306 TTE W/DOPPLER COMPLETE: CPT

## 2022-03-17 PROCEDURE — 36415 COLL VENOUS BLD VENIPUNCTURE: CPT

## 2022-03-17 PROCEDURE — 93010 ELECTROCARDIOGRAM REPORT: CPT | Performed by: INTERNAL MEDICINE

## 2022-03-17 PROCEDURE — 2580000003 HC RX 258: Performed by: INTERNAL MEDICINE

## 2022-03-17 PROCEDURE — 2060000000 HC ICU INTERMEDIATE R&B

## 2022-03-17 PROCEDURE — 99233 SBSQ HOSP IP/OBS HIGH 50: CPT | Performed by: INTERNAL MEDICINE

## 2022-03-17 PROCEDURE — 80053 COMPREHEN METABOLIC PANEL: CPT

## 2022-03-17 RX ORDER — SODIUM CHLORIDE 0.9 % (FLUSH) 0.9 %
5-40 SYRINGE (ML) INJECTION EVERY 12 HOURS SCHEDULED
Status: CANCELLED | OUTPATIENT
Start: 2022-03-17

## 2022-03-17 RX ORDER — SODIUM CHLORIDE 9 MG/ML
25 INJECTION, SOLUTION INTRAVENOUS PRN
Status: CANCELLED | OUTPATIENT
Start: 2022-03-17

## 2022-03-17 RX ORDER — SODIUM CHLORIDE 0.9 % (FLUSH) 0.9 %
5-40 SYRINGE (ML) INJECTION EVERY 12 HOURS SCHEDULED
Status: CANCELLED | OUTPATIENT
Start: 2022-03-18

## 2022-03-17 RX ORDER — FUROSEMIDE 10 MG/ML
40 INJECTION INTRAMUSCULAR; INTRAVENOUS 2 TIMES DAILY
Status: DISCONTINUED | OUTPATIENT
Start: 2022-03-17 | End: 2022-03-20

## 2022-03-17 RX ORDER — POTASSIUM CHLORIDE 20 MEQ/1
20 TABLET, EXTENDED RELEASE ORAL
Status: COMPLETED | OUTPATIENT
Start: 2022-03-17 | End: 2022-03-17

## 2022-03-17 RX ORDER — SODIUM CHLORIDE 9 MG/ML
25 INJECTION, SOLUTION INTRAVENOUS PRN
Status: CANCELLED | OUTPATIENT
Start: 2022-03-18

## 2022-03-17 RX ORDER — DIGOXIN 0.25 MG/ML
250 INJECTION INTRAMUSCULAR; INTRAVENOUS
Status: COMPLETED | OUTPATIENT
Start: 2022-03-17 | End: 2022-03-18

## 2022-03-17 RX ORDER — DILTIAZEM HYDROCHLORIDE 180 MG/1
360 CAPSULE, COATED, EXTENDED RELEASE ORAL DAILY
Status: DISCONTINUED | OUTPATIENT
Start: 2022-03-18 | End: 2022-03-17

## 2022-03-17 RX ORDER — SODIUM CHLORIDE 0.9 % (FLUSH) 0.9 %
5-40 SYRINGE (ML) INJECTION PRN
Status: CANCELLED | OUTPATIENT
Start: 2022-03-18

## 2022-03-17 RX ORDER — DILTIAZEM HYDROCHLORIDE 120 MG/1
120 CAPSULE, COATED, EXTENDED RELEASE ORAL ONCE
Status: COMPLETED | OUTPATIENT
Start: 2022-03-17 | End: 2022-03-17

## 2022-03-17 RX ORDER — SODIUM CHLORIDE 9 MG/ML
INJECTION, SOLUTION INTRAVENOUS CONTINUOUS
Status: CANCELLED | OUTPATIENT
Start: 2022-03-18

## 2022-03-17 RX ORDER — SODIUM CHLORIDE 0.9 % (FLUSH) 0.9 %
5-40 SYRINGE (ML) INJECTION PRN
Status: CANCELLED | OUTPATIENT
Start: 2022-03-17

## 2022-03-17 RX ADMIN — DIGOXIN 250 MCG: 0.25 INJECTION INTRAMUSCULAR; INTRAVENOUS at 23:29

## 2022-03-17 RX ADMIN — FUROSEMIDE 20 MG: 10 INJECTION, SOLUTION INTRAMUSCULAR; INTRAVENOUS at 09:39

## 2022-03-17 RX ADMIN — DILTIAZEM HYDROCHLORIDE 15 MG/HR: 5 INJECTION INTRAVENOUS at 18:40

## 2022-03-17 RX ADMIN — POTASSIUM CHLORIDE 20 MEQ: 20 TABLET, EXTENDED RELEASE ORAL at 12:09

## 2022-03-17 RX ADMIN — DIGOXIN 250 MCG: 0.25 INJECTION INTRAMUSCULAR; INTRAVENOUS at 21:09

## 2022-03-17 RX ADMIN — DEXTROSE MONOHYDRATE 150 MG: 50 INJECTION, SOLUTION INTRAVENOUS at 19:59

## 2022-03-17 RX ADMIN — BUDESONIDE AND FORMOTEROL FUMARATE DIHYDRATE 2 PUFF: 160; 4.5 AEROSOL RESPIRATORY (INHALATION) at 09:08

## 2022-03-17 RX ADMIN — FUROSEMIDE 40 MG: 10 INJECTION, SOLUTION INTRAMUSCULAR; INTRAVENOUS at 21:09

## 2022-03-17 RX ADMIN — PANTOPRAZOLE SODIUM 40 MG: 40 TABLET, DELAYED RELEASE ORAL at 06:17

## 2022-03-17 RX ADMIN — POTASSIUM CHLORIDE 20 MEQ: 20 TABLET, EXTENDED RELEASE ORAL at 01:30

## 2022-03-17 RX ADMIN — BUDESONIDE AND FORMOTEROL FUMARATE DIHYDRATE 2 PUFF: 160; 4.5 AEROSOL RESPIRATORY (INHALATION) at 20:16

## 2022-03-17 RX ADMIN — FUROSEMIDE 20 MG: 10 INJECTION, SOLUTION INTRAMUSCULAR; INTRAVENOUS at 17:42

## 2022-03-17 RX ADMIN — DILTIAZEM HYDROCHLORIDE 240 MG: 240 CAPSULE, COATED, EXTENDED RELEASE ORAL at 09:39

## 2022-03-17 RX ADMIN — ASPIRIN 81 MG 81 MG: 81 TABLET ORAL at 09:39

## 2022-03-17 RX ADMIN — PREDNISONE 40 MG: 20 TABLET ORAL at 09:39

## 2022-03-17 RX ADMIN — DILTIAZEM HYDROCHLORIDE 120 MG: 120 CAPSULE, COATED, EXTENDED RELEASE ORAL at 12:09

## 2022-03-17 RX ADMIN — DILTIAZEM HYDROCHLORIDE 15 MG/HR: 5 INJECTION INTRAVENOUS at 08:14

## 2022-03-17 RX ADMIN — AMIODARONE HYDROCHLORIDE 1 MG/MIN: 50 INJECTION, SOLUTION INTRAVENOUS at 20:19

## 2022-03-17 RX ADMIN — EZETIMIBE 10 MG: 10 TABLET ORAL at 09:38

## 2022-03-17 RX ADMIN — APIXABAN 5 MG: 5 TABLET, FILM COATED ORAL at 21:09

## 2022-03-17 RX ADMIN — POTASSIUM CHLORIDE 20 MEQ: 20 TABLET, EXTENDED RELEASE ORAL at 04:30

## 2022-03-17 RX ADMIN — APIXABAN 5 MG: 5 TABLET, FILM COATED ORAL at 09:38

## 2022-03-17 RX ADMIN — POTASSIUM CHLORIDE 20 MEQ: 20 TABLET, EXTENDED RELEASE ORAL at 16:06

## 2022-03-17 ASSESSMENT — PAIN SCALES - GENERAL
PAINLEVEL_OUTOF10: 0

## 2022-03-17 NOTE — CONSULTS
Cardiology Consultation     Cait Neely  1955    PCP: Zeinab Cardenas MD  Referring Physician: Dr. Pelon Perla   Reason for Referral: afib   Chief Complaint:   Chief Complaint   Patient presents with    Cough     pt seen at PCP today. c/o BLE edema. history of afib.  pt on eloquis. SOB for about 2 weeks. denies fever. Subjective:     History of Present Illness: The patient is 73 y.o. male with a past medical history significant for COPD, HTN, HL, pafib, who presented with the above complaint. Admits to several days of increasing dyspnea with minimal exertion. He additionally has worsening bilateral leg swelling. He denies chest pain, PND, orthopnea, dyspnea at rest, palpitations, or syncope. He is admitted with afib with RVR requiring IV cardizem for rate control     Past Medical History:   Diagnosis Date    Chronic pain syndrome     Chronic pain syndrome     COPD (chronic obstructive pulmonary disease) ([facility] [address] 6/5/2012    DDD (degenerative disc disease), lumbar     Depression 6/5/2012    Diverticulitis 6/5/2012    DJD (degenerative joint disease) of knee     Foraminal stenosis of lumbar region     LORIE (generalized anxiety disorder) 6/5/2012    GERD (gastroesophageal reflux disease) 6/5/2012    HTN (hypertension) 6/5/2012    Incidental lung nodule     Insomnia     Lumbar radiculitis     Neuropathy 6/5/2012    Nontraumatic rupture of tendons of biceps (long head) 6/5/2012    Numbness and tingling of both legs     Pernicious anemia 6/5/2012    Sciatica     Sprain and strain of unspecified site of elbow and forearm 6/5/2012     Past Surgical History:   Procedure Laterality Date    MUSCLE REPAIR Bilateral 8-10 years ago    Pulled bilateral biceps off bone from lifting weights.      Family History   Problem Relation Age of Onset    Arthritis Father     Other Father         GERD    Heart Disease Father         had heart attack     Social History     Tobacco Use    Smoking status: Current Every Day Smoker     Packs/day: 1.50     Years: 50.00     Pack years: 75.00     Types: Cigarettes    Smokeless tobacco: Never Used    Tobacco comment: pt smokes 1 pk and a half a day    Vaping Use    Vaping Use: Never used   Substance Use Topics    Alcohol use: No     Comment: no longer drinks    Drug use: Yes     Types: Marijuana (Weed)     Comment: daily use, \"like a cocktail\"       Allergies   Allergen Reactions    Atorvastatin-Coenzyme Q10 Diarrhea    Lodine [Etodolac]     Neurontin [Gabapentin]      edema    Pregabalin Swelling    Vioxx Diarrhea    Wellbutrin [Bupropion Hcl]      Current Facility-Administered Medications   Medication Dose Route Frequency Provider Last Rate Last Admin    dilTIAZem (CARDIZEM CD) extended release capsule 240 mg  240 mg Oral Daily Phuc Peres MD        0.9 % sodium chloride bolus  500 mL IntraVENous Once Laura Soares MD        dilTIAZem 125 mg in dextrose 5 % 125 mL infusion  2.5-15 mg/hr IntraVENous Continuous Laura Soares MD 15 mL/hr at 03/16/22 1525 15 mg/hr at 03/16/22 1525    apixaban (ELIQUIS) tablet 5 mg  5 mg Oral BID Celeste Couch MD   5 mg at 03/16/22 2025    aspirin chewable tablet 81 mg  81 mg Oral Daily Juliana Boss MD   81 mg at 03/16/22 0856    budesonide-formoterol (SYMBICORT) 160-4.5 MCG/ACT inhaler 2 puff  2 puff Inhalation BID Celeste Couch MD   2 puff at 03/16/22 2021    ezetimibe (ZETIA) tablet 10 mg  10 mg Oral Daily Juliana Boss MD   10 mg at 03/16/22 0856    predniSONE (DELTASONE) tablet 40 mg  40 mg Oral Daily Juliana Boss MD   40 mg at 03/16/22 0856    furosemide (LASIX) injection 20 mg  20 mg IntraVENous BID Celeste Couch MD   20 mg at 03/16/22 1813    pantoprazole (PROTONIX) tablet 40 mg  40 mg Oral QAM AC Juliana Boss MD   40 mg at 03/16/22 0615    potassium chloride 10 mEq/100 mL IVPB (Peripheral Line)  10 mEq IntraVENous PRN Andra Chester MD           Review of Systems:  · Constitutional: No unanticipated weight loss. There's been no change in energy level, sleep pattern, or activity level. No fevers, chills. · Eyes: No visual changes or diplopia. No scleral icterus. · ENT: No Headaches, hearing loss or vertigo. No mouth sores or sore throat. · Cardiovascular: as reviewed in HPI  · Respiratory: No cough or wheezing, no sputum production. No hemoptysis. · Gastrointestinal: No abdominal pain, appetite loss, blood in stools. No change in bowel or bladder habits. · Genitourinary: No dysuria, trouble voiding, or hematuria. · Musculoskeletal:  No gait disturbance, no joint complaints. · Integumentary: No rash or pruritis. · Neurological: No headache, diplopia, change in muscle strength, numbness or tingling. · Psychiatric: No anxiety or depression. · Endocrine: No temperature intolerance. No excessive thirst, fluid intake, or urination. No tremor. · Hematologic/Lymphatic: No abnormal bruising or bleeding, blood clots or swollen lymph nodes. · Allergic/Immunologic: No nasal congestion or hives. Physical Exam:   BP (!) 148/105   Pulse 125   Temp 97.5 °F (36.4 °C) (Oral)   Resp 23   Ht 5' 8\" (1.727 m)   Wt 193 lb 12.6 oz (87.9 kg)   SpO2 96%   BMI 29.46 kg/m²   Wt Readings from Last 3 Encounters:   03/16/22 193 lb 12.6 oz (87.9 kg)   03/15/22 195 lb (88.5 kg)   01/21/22 196 lb (88.9 kg)     Constitutional: He is oriented to person, place, and time. He appears well-developed and well-nourished. In no acute distress. Head: Normocephalic and atraumatic. Pupils equal and round. Neck: Neck supple. No JVP or carotid bruit appreciated. No mass and no thyromegaly present. No lymphadenopathy present. Cardiovascular: irregular rate. Normal heart sounds. Exam reveals no gallop and no friction rub. No murmur heard. Pulmonary/Chest: Effort normal and breath sounds normal. No respiratory distress.  He has no wheezes, rhonchi or rales. Abdominal: Soft, non-tender. Bowel sounds are normal. He exhibits no organomegaly, mass or bruit. Extremities: 4+ pitting edema. No cyanosis or clubbing. Pulses are 2+ radial/carotid bilaterally. Neurological: No gross cranial nerve deficit. Coordination normal.   Skin: Skin is warm and dry. There is no rash or diaphoresis. Psychiatric: He has a normal mood and affect.  His speech is normal and behavior is normal.     Lab Review:   FLP:    Lab Results   Component Value Date    TRIG 249 08/25/2021    HDL 44 08/25/2021    LDLCALC 193 08/25/2021    LABVLDL 48 08/25/2021     BUN/Creatinine:    Lab Results   Component Value Date    BUN 12 03/16/2022    CREATININE 1.0 03/16/2022     PT/INR, TNI, HGB A1C:   Lab Results   Component Value Date/Time    TROPONINI 0.03 (H) 03/16/2022 07:28 AM    LABA1C 5.9 (H) 08/25/2021 12:00 AM      No results found for: CBCAUTODIF    EKG: afib with RVR      Echo:      2018  Summary   Left ventricular size is mildly increased.   Normal left ventricular wall thickness.   Left ventricular function is normal with ejection fraction estimated at   55-60 %.   There is reversal of E/A inflow velocities across the mitral valve.   Mild thickening of anterior leaflet of mitral valve.   Trivial mitral regurgitation is present.   Trivial tricuspid regurgitation.   Trivial pulmonic regurgitation present.     Stress Test: 2018   Summary   Baseline ECG makes stress test difficult to interpret but no clear ischemic   ECG changes were seen   frequent PVC developing during the exercise phase   Poor exercise capacity     Cath: 2015  OVERALL IMPRESSION:    1.  Patent, nondominant right coronary artery.    2.  Patent left main trunk.    3.  Patent left anterior descending artery and its branches.    4.  Patent dominant circumflex artery and its branches.    5.  Normal left ventricular systolic function.    6.  Patent right femoral, common femoral artery    All above diagnostic testing and laboratory data was independently visualized and reviewed by me (not simply review of report)       Assessment and Plan   1) afib   - rapid rates, continue  IV cardizem and will transition to long acting PO cardizem   - DOAC   - repeat echo for LV function, concern for new low EF due to rapid rates     2) edema  - echo   - strong suspicion for new onset tachycardia induced cardiomyopathy   - IV lasix     Thank you very much for allowing me to participate in the care of your patient. Please do not hesitate to contact me if you have any questions.       Akin Hoffman MD [address] [facility], Interventional Cardiology, and Peripheral Vascular Disease   [facility]   Ph: [phone]  Fax: [phone]

## 2022-03-17 NOTE — PROGRESS NOTES
Seen and examined   Rapid afib   Volume overloaded  Rate control; will add PO CCB  C/w IV lasix   Echo pending     Full consult to follow    Molly Carter MD [facility], Interventional Cardiology, and Peripheral Vascular [address]   (O): [phone]  (F): [phone]

## 2022-03-17 NOTE — PROGRESS NOTES
Hospitalist Progress Note  3/17/2022 11:20 AM  Subjective:   Admit Date: 3/15/2022  PCP: Brenden Ambriz MD  Interval History: pt feels better  Just had echo  No complaints  Chart reviewed. Diet: ADULT DIET; Regular; Low Sodium (2 gm); 1500 ml  Medications:   Scheduled Meds:   potassium chloride  20 mEq Oral Q3H    dilTIAZem  240 mg Oral Daily    sodium chloride  500 mL IntraVENous Once    apixaban  5 mg Oral BID    aspirin  81 mg Oral Daily    budesonide-formoterol  2 puff Inhalation BID    ezetimibe  10 mg Oral Daily    predniSONE  40 mg Oral Daily    furosemide  20 mg IntraVENous BID    pantoprazole  40 mg Oral QAM AC     Continuous Infusions:   dilTIAZem 15 mg/hr (03/17/22 0814)     CBC:   Recent Labs     03/15/22  1800 03/16/22  0728   WBC 10.5 9.6   HGB 16.7 15.8    324     BMP:    Recent Labs     03/15/22  1800 03/16/22  0728 03/16/22  2250 03/17/22  0423    138  --  137   K 2.8* 2.6* 3.0* 3.2*   CL 92* 93*  --  91*   CO2 30 30  --  34*   BUN 12 12  --  18   CREATININE 1.1 1.0  --  1.4*   GLUCOSE 92 109*  --  133*     Hepatic:   Recent Labs     03/17/22  0423   AST 19   ALT 20   BILITOT 0.6   ALKPHOS 78     Troponin:   Recent Labs     03/15/22  1800 03/16/22  0728   TROPONINI 0.04* 0.03*     BNP: No results for input(s): BNP in the last 72 hours. Lipids: No results for input(s): CHOL, HDL in the last 72 hours. Invalid input(s): LDLCALCU  INR: No results for input(s): INR in the last 72 hours. Objective:   Vitals: /87   Pulse 128   Temp 98.2 °F (36.8 °C) (Oral)   Resp 19   Ht 5' 8\" (1.727 m)   Wt 189 lb 9.5 oz (86 kg)   SpO2 95%   BMI 28.83 kg/m²   General appearance: alert,awake,oriented x 3 and cooperative with exam  HEENT: Head: Normal, normocephalic, atraumatic, anicteric, pupils are reactive to light. Dry mucous membrane.   Neck: no adenopathy, no carotid bruit, supple, symmetrical, trachea midline and thyroid not enlarged, symmetric, no tenderness/mass/nodules  Lungs: clear to auscultation bilaterally  Heart: regular rate and rhythm, S1, S2 normal, no murmur, click, rub or gallop  Abdomen: soft, non-tender; bowel sounds normal; no masses,  no organomegaly  Extremities: extremities mild edemaNeurologic: Mental status: Alert, oriented, thought content appropriate    Assessment and Plan:   1. afib with rvr- on cardizem drip  2. Hypokalemia-replace  3. Copd exacerbation-stable    Patient Active Problem List:     GERD (gastroesophageal reflux disease)     Dysthymic disorder     Neuropathy     Nontraumatic rupture of tendons of biceps (long head)     Sprain and strain of unspecified site of elbow and forearm     LORIE (generalized anxiety disorder)     Diverticulitis     Pernicious anemia     Chronic obstructive pulmonary disease (HCC)     Tobacco abuse     Chronic pain syndrome     Lung nodule     Sciatica     Numbness and tingling of both legs     Neuropathy, peroneal nerve     DDD (degenerative disc disease), lumbar     Lumbar radiculitis     Insomnia     DJD (degenerative joint disease) of knee     Foraminal stenosis of lumbar region     Back pain, chronic     Need for prophylactic vaccination and inoculation against influenza     Acute gouty arthropathy     Knee arthropathy     Edema     Cervical paraspinal muscle spasm     Pleurisy     HLD (hyperlipidemia)     COPD exacerbation (HCC)     Chest pain     Chest pain     Hypokalemia     Abnormal nuclear stress test     Uncontrolled hypertension     Atrial fibrillation ([address]     Erythrocytosis     Encounter for smoking cessation counseling     Paroxysmal atrial fibrillation with rapid ventricular response ([address]     A-fib ([address]     Cardiomegaly     Hypomagnesemia    Pt is stable. Discussed with staff and pt about the plan. See the orders for further plan. Will proceed further acc to pt's progress.   Time spent with management of the pt is-30 minutes      Electronically signed by: Rosanna Moore MD, on 3/17/2022, at 11:20 AM

## 2022-03-18 ENCOUNTER — HOSPITAL ENCOUNTER (INPATIENT)
Dept: CARDIAC CATH/INVASIVE PROCEDURES | Age: 67
Discharge: HOME | DRG: 291 | End: 2022-03-18
Payer: COMMERCIAL

## 2022-03-18 LAB
A/G RATIO: 1.2 (ref 1.1–2.2)
ALBUMIN SERPL-MCNC: 3.8 G/DL (ref 3.4–5)
ALP BLD-CCNC: 76 U/L (ref 40–129)
ALT SERPL-CCNC: 49 U/L (ref 10–40)
ANION GAP SERPL CALCULATED.3IONS-SCNC: 13 MMOL/L (ref 3–16)
AST SERPL-CCNC: 56 U/L (ref 15–37)
BILIRUB SERPL-MCNC: 0.5 MG/DL (ref 0–1)
BUN BLDV-MCNC: 24 MG/DL (ref 7–20)
CALCIUM SERPL-MCNC: 9.4 MG/DL (ref 8.3–10.6)
CHLORIDE BLD-SCNC: 93 MMOL/L (ref 99–110)
CO2: 33 MMOL/L (ref 21–32)
CREAT SERPL-MCNC: 1.7 MG/DL (ref 0.8–1.3)
GFR AFRICAN AMERICAN: 49
GFR NON-AFRICAN AMERICAN: 41
GLUCOSE BLD-MCNC: 116 MG/DL (ref 70–99)
LV EF: 20 %
LVEF MODALITY: NORMAL
MAGNESIUM: 1.8 MG/DL (ref 1.8–2.4)
POTASSIUM SERPL-SCNC: 3.2 MMOL/L (ref 3.5–5.1)
SARS-COV-2, NAAT: NOT DETECTED
SODIUM BLD-SCNC: 139 MMOL/L (ref 136–145)
TOTAL PROTEIN: 6.9 G/DL (ref 6.4–8.2)

## 2022-03-18 PROCEDURE — 2700000000 HC OXYGEN THERAPY PER DAY

## 2022-03-18 PROCEDURE — 94640 AIRWAY INHALATION TREATMENT: CPT

## 2022-03-18 PROCEDURE — 93325 DOPPLER ECHO COLOR FLOW MAPG: CPT

## 2022-03-18 PROCEDURE — 2580000003 HC RX 258: Performed by: INTERNAL MEDICINE

## 2022-03-18 PROCEDURE — 80053 COMPREHEN METABOLIC PANEL: CPT

## 2022-03-18 PROCEDURE — 6360000002 HC RX W HCPCS: Performed by: INTERNAL MEDICINE

## 2022-03-18 PROCEDURE — 6370000000 HC RX 637 (ALT 250 FOR IP): Performed by: INTERNAL MEDICINE

## 2022-03-18 PROCEDURE — 2500000003 HC RX 250 WO HCPCS

## 2022-03-18 PROCEDURE — 2580000003 HC RX 258

## 2022-03-18 PROCEDURE — 94761 N-INVAS EAR/PLS OXIMETRY MLT: CPT

## 2022-03-18 PROCEDURE — 2060000000 HC ICU INTERMEDIATE R&B

## 2022-03-18 PROCEDURE — 83735 ASSAY OF MAGNESIUM: CPT

## 2022-03-18 PROCEDURE — 99152 MOD SED SAME PHYS/QHP 5/>YRS: CPT | Performed by: INTERNAL MEDICINE

## 2022-03-18 PROCEDURE — 6360000002 HC RX W HCPCS

## 2022-03-18 PROCEDURE — 92960 CARDIOVERSION ELECTRIC EXT: CPT | Performed by: INTERNAL MEDICINE

## 2022-03-18 PROCEDURE — 87635 SARS-COV-2 COVID-19 AMP PRB: CPT

## 2022-03-18 PROCEDURE — 6370000000 HC RX 637 (ALT 250 FOR IP)

## 2022-03-18 PROCEDURE — 93312 ECHO TRANSESOPHAGEAL: CPT

## 2022-03-18 PROCEDURE — 99232 SBSQ HOSP IP/OBS MODERATE 35: CPT | Performed by: INTERNAL MEDICINE

## 2022-03-18 PROCEDURE — B245ZZ4 ULTRASONOGRAPHY OF LEFT HEART, TRANSESOPHAGEAL: ICD-10-PCS | Performed by: INTERNAL MEDICINE

## 2022-03-18 PROCEDURE — 36415 COLL VENOUS BLD VENIPUNCTURE: CPT

## 2022-03-18 RX ORDER — BUDESONIDE AND FORMOTEROL FUMARATE DIHYDRATE 160; 4.5 UG/1; UG/1
2 AEROSOL RESPIRATORY (INHALATION) 2 TIMES DAILY
Status: DISCONTINUED | OUTPATIENT
Start: 2022-03-18 | End: 2022-03-18 | Stop reason: SDUPTHER

## 2022-03-18 RX ORDER — HYDROCHLOROTHIAZIDE 25 MG/1
25 TABLET ORAL DAILY
Status: DISCONTINUED | OUTPATIENT
Start: 2022-03-18 | End: 2022-03-20

## 2022-03-18 RX ORDER — SODIUM CHLORIDE 0.9 % (FLUSH) 0.9 %
5-40 SYRINGE (ML) INJECTION EVERY 12 HOURS SCHEDULED
Status: DISCONTINUED | OUTPATIENT
Start: 2022-03-18 | End: 2022-03-18 | Stop reason: SDUPTHER

## 2022-03-18 RX ORDER — SODIUM CHLORIDE 9 MG/ML
INJECTION, SOLUTION INTRAVENOUS CONTINUOUS
Status: DISCONTINUED | OUTPATIENT
Start: 2022-03-18 | End: 2022-03-19 | Stop reason: HOSPADM

## 2022-03-18 RX ORDER — SODIUM CHLORIDE 0.9 % (FLUSH) 0.9 %
5-40 SYRINGE (ML) INJECTION PRN
Status: DISCONTINUED | OUTPATIENT
Start: 2022-03-18 | End: 2022-03-18 | Stop reason: SDUPTHER

## 2022-03-18 RX ORDER — PANTOPRAZOLE SODIUM 40 MG/1
40 TABLET, DELAYED RELEASE ORAL
Status: DISCONTINUED | OUTPATIENT
Start: 2022-03-19 | End: 2022-03-18 | Stop reason: SDUPTHER

## 2022-03-18 RX ORDER — SODIUM CHLORIDE 9 MG/ML
25 INJECTION, SOLUTION INTRAVENOUS PRN
Status: DISCONTINUED | OUTPATIENT
Start: 2022-03-18 | End: 2022-03-19 | Stop reason: HOSPADM

## 2022-03-18 RX ORDER — SODIUM CHLORIDE 9 MG/ML
25 INJECTION, SOLUTION INTRAVENOUS PRN
Status: DISCONTINUED | OUTPATIENT
Start: 2022-03-18 | End: 2022-03-18 | Stop reason: SDUPTHER

## 2022-03-18 RX ORDER — SODIUM CHLORIDE 0.9 % (FLUSH) 0.9 %
5-40 SYRINGE (ML) INJECTION EVERY 12 HOURS SCHEDULED
Status: DISCONTINUED | OUTPATIENT
Start: 2022-03-18 | End: 2022-03-19 | Stop reason: HOSPADM

## 2022-03-18 RX ORDER — HYDROCHLOROTHIAZIDE 25 MG/1
25 TABLET ORAL DAILY
Status: DISCONTINUED | OUTPATIENT
Start: 2022-03-18 | End: 2022-03-18

## 2022-03-18 RX ORDER — ASPIRIN 81 MG/1
81 TABLET, CHEWABLE ORAL DAILY
Status: DISCONTINUED | OUTPATIENT
Start: 2022-03-18 | End: 2022-03-18 | Stop reason: SDUPTHER

## 2022-03-18 RX ORDER — ALBUTEROL SULFATE 90 UG/1
2 AEROSOL, METERED RESPIRATORY (INHALATION) 4 TIMES DAILY PRN
Status: DISCONTINUED | OUTPATIENT
Start: 2022-03-18 | End: 2022-03-19 | Stop reason: HOSPADM

## 2022-03-18 RX ORDER — SODIUM CHLORIDE 0.9 % (FLUSH) 0.9 %
5-40 SYRINGE (ML) INJECTION PRN
Status: DISCONTINUED | OUTPATIENT
Start: 2022-03-18 | End: 2022-03-19 | Stop reason: HOSPADM

## 2022-03-18 RX ORDER — DILTIAZEM HYDROCHLORIDE 240 MG/1
240 CAPSULE, COATED, EXTENDED RELEASE ORAL DAILY
Status: DISCONTINUED | OUTPATIENT
Start: 2022-03-18 | End: 2022-03-19

## 2022-03-18 RX ORDER — ALBUTEROL SULFATE 90 UG/1
2 AEROSOL, METERED RESPIRATORY (INHALATION) 4 TIMES DAILY PRN
Status: DISCONTINUED | OUTPATIENT
Start: 2022-03-18 | End: 2022-03-18 | Stop reason: SDUPTHER

## 2022-03-18 RX ORDER — DILTIAZEM HYDROCHLORIDE 240 MG/1
240 CAPSULE, EXTENDED RELEASE ORAL DAILY
Status: DISCONTINUED | OUTPATIENT
Start: 2022-03-18 | End: 2022-03-18

## 2022-03-18 RX ORDER — EZETIMIBE 10 MG/1
10 TABLET ORAL DAILY
Status: DISCONTINUED | OUTPATIENT
Start: 2022-03-18 | End: 2022-03-18 | Stop reason: SDUPTHER

## 2022-03-18 RX ADMIN — AMIODARONE HYDROCHLORIDE 0.5 MG/MIN: 50 INJECTION, SOLUTION INTRAVENOUS at 06:03

## 2022-03-18 RX ADMIN — EZETIMIBE 10 MG: 10 TABLET ORAL at 09:01

## 2022-03-18 RX ADMIN — PANTOPRAZOLE SODIUM 40 MG: 40 TABLET, DELAYED RELEASE ORAL at 10:12

## 2022-03-18 RX ADMIN — DIGOXIN 250 MCG: 0.25 INJECTION INTRAMUSCULAR; INTRAVENOUS at 03:00

## 2022-03-18 RX ADMIN — PREDNISONE 40 MG: 20 TABLET ORAL at 09:01

## 2022-03-18 RX ADMIN — ASPIRIN 81 MG 81 MG: 81 TABLET ORAL at 09:01

## 2022-03-18 RX ADMIN — FUROSEMIDE 40 MG: 10 INJECTION, SOLUTION INTRAMUSCULAR; INTRAVENOUS at 09:00

## 2022-03-18 RX ADMIN — BUDESONIDE AND FORMOTEROL FUMARATE DIHYDRATE 2 PUFF: 160; 4.5 AEROSOL RESPIRATORY (INHALATION) at 20:04

## 2022-03-18 RX ADMIN — APIXABAN 5 MG: 5 TABLET, FILM COATED ORAL at 21:05

## 2022-03-18 RX ADMIN — DEXTROSE MONOHYDRATE 300 MG: 50 INJECTION, SOLUTION INTRAVENOUS at 21:52

## 2022-03-18 RX ADMIN — BUDESONIDE AND FORMOTEROL FUMARATE DIHYDRATE 2 PUFF: 160; 4.5 AEROSOL RESPIRATORY (INHALATION) at 08:27

## 2022-03-18 RX ADMIN — HYDROCHLOROTHIAZIDE 25 MG: 25 TABLET ORAL at 22:54

## 2022-03-18 RX ADMIN — DIGOXIN 250 MCG: 0.25 INJECTION INTRAMUSCULAR; INTRAVENOUS at 01:07

## 2022-03-18 RX ADMIN — DILTIAZEM HYDROCHLORIDE 240 MG: 240 CAPSULE, COATED, EXTENDED RELEASE ORAL at 22:54

## 2022-03-18 RX ADMIN — APIXABAN 5 MG: 5 TABLET, FILM COATED ORAL at 09:01

## 2022-03-18 RX ADMIN — FUROSEMIDE 40 MG: 10 INJECTION, SOLUTION INTRAMUSCULAR; INTRAVENOUS at 18:02

## 2022-03-18 ASSESSMENT — PAIN SCALES - GENERAL
PAINLEVEL_OUTOF10: 0
PAINLEVEL_OUTOF10: 0

## 2022-03-18 NOTE — ANESTHESIA PRE-OP
H&P Update    I have reviewed the history and physical and examined the patient and find no relevant changes. I have reviewed with the patient and/or family the risks, benefits, and alternatives to the procedure. Pre-sedation Assessment    Patient:  Star Mcmahon   :   1955  Intended Procedure: DIANA with cardioversion    [facility] nurse's notes reviewed and agreed. Medications reviewed  Allergies: Allergies   Allergen Reactions    Atorvastatin-Coenzyme Q10 Diarrhea    Lodine [Etodolac]     Neurontin [Gabapentin]      edema    Pregabalin Swelling    Vioxx Diarrhea    Wellbutrin [Bupropion Hcl]          Pre-Procedure Assessment/Plan:  ASA 2 - Patient with mild systemic disease with no functional limitations  Mallampati 2    Level of Sedation Plan:Deep sedation    Anginal Classification w/in 2 weeks: 0    Heart Failure w/in 2 weeks:  If yes NYHA class: 0    Post Procedure plan: Return to same level of care

## 2022-03-18 NOTE — PROGRESS NOTES
fibrillation. Query created by: Jordan Wyatt on 3/18/2022 9:26 AM      QUERY TEXT:    Pt admitted with  AF RVR. Pt noted to have increasing creatinine. If possible,   please document in the progress notes and discharge summary if you are   evaluating and/or treating any of the following: The medical record reflects the following:  Risk Factors: HX- HTN, At Fib  Clinical Indicators: CRT 1.1, 1.0, 1.4, 1.7  Treatment: Monitor    Defined by Kidney Disease Improving Global Outcomes (KDIGO) clinical practice   guideline for acute kidney injury:  -Increase in SCr by greater than or equal to 0.3 mg/dl within 48 hours; or  -Increase or decrease in SCr to greater than or equal to 1.5 times baseline,   which is known or presumed to have occurred within the prior 7 days; or  -Urine volume < 0.5ml/kg/h for 6 hours    Thank You,  Morena Espinosa RN BSN CDS CRCR  [email or web address]  Options provided:  -- Acute kidney failure  -- Acute kidney failure with acute tubular necrosis  -- Acute kidney injury  -- Other - I will add my own diagnosis  -- Disagree - Not applicable / Not valid  -- Disagree - Clinically unable to determine / Unknown  -- Refer to Clinical Documentation Reviewer    PROVIDER RESPONSE TEXT:    This patient is in Acute kidney failure.     Query created by: Jordan Wyatt on 3/18/2022 9:31 AM      Electronically signed by:  Ellie Noel MD 3/18/2022 3:19 PM

## 2022-03-18 NOTE — PLAN OF CARE
Problem: FLUID AND ELECTROLYTE IMBALANCE  Goal: Fluid and electrolyte balance are achieved/maintained  Outcome: Ongoing   Assess breath sounds, oxygen requirements and saturations, and activity tolerance. Monitor intake and output and daily weights and lab values. Encourage fluid and dietary restrictions.

## 2022-03-18 NOTE — PROGRESS NOTES
Cardiology Progress Note   PCP: Raine Dubose MD  Chief Complaint:   Chief Complaint   Patient presents with    Cough     pt seen at PCP today. c/o BLE edema. history of afib.  pt on eloquis. SOB for about 2 weeks. denies fever. LOS: 2     Interval history:   Afib rates mildly improved   LV EF ~ 20%  Remains volume overloaded     Subjective:   History of Present Illness: Anabella Neumann is a 73 y.o. male patient who presents with the above chief complaint. PMhx of HTN who presents with two weeks of worsening dyspnea with minimal exertion, associated with palpitations and significant bilateral lower extremity edema. Review of Systems:   · Constitutional: no unanticipated weight loss. There's been no change in energy level, sleep pattern, or activity level. No fevers, chills. · Eyes: No visual changes or diplopia. No scleral icterus. · ENT: No Headaches, hearing loss or vertigo. No mouth sores or sore throat. · Cardiovascular: as reviewed in HPI  · Respiratory: No cough or wheezing, no sputum production. No hemoptysis. · Gastrointestinal: No abdominal pain, appetite loss, blood in stools. No change in bowel or bladder habits. · Genitourinary: No dysuria, trouble voiding, or hematuria. · Musculoskeletal:  No gait disturbance, no joint complaints. · Integumentary: No rash or pruritis. · Neurological: No headache, diplopia, change in muscle strength, numbness or tingling. · Psychiatric: No anxiety or depression. · Endocrine: No temperature intolerance. No excessive thirst, fluid intake, or urination. No tremor. · Hematologic/Lymphatic: No abnormal bruising or bleeding, blood clots or swollen lymph nodes. · Allergic/Immunologic: No nasal congestion or hives.     Objective:   PHYSICAL EXAM:    Vitals:    03/17/22 2200   BP: (!) 124/108   Pulse: 109   Resp: 15   Temp:    SpO2:     Weight: 189 lb 9.5 oz (86 kg)       General Appearance: Alert, cooperative, no distress, appears stated age. Head:  Normocephalic, without obvious abnormality, atraumatic. Eyes:  Pupils equal and round. No scleral icterus. Mouth: Moist mucosa, no pharyngeal erythema. Nose: Nares normal. No drainage or sinus tenderness. Neck: Supple, symmetrical, trachea midline. No adenopathy. No tenderness/mass/nodules. No carotid bruit or elevated JVD. Lungs:   Clear to auscultation bilaterally, respirations unlabored. No wheeze, rales, or rhonchi. Chest Wall:  No tenderness or deformity. Heart:  iiregular rate. S1/S2 normal. No murmur, rub, or gallop. Abdomen:   Soft, non-tender, bowel sounds active. Musculoskeletal: No muscle wasting or digital clubbing. Extremities: Extremities normal, atraumatic. 4+edema. Pulses: 2+ radial and carotid pulses, symmetric. Skin: No rashes or lesions. Pysch: Normal mood and affect. Alert and oriented x 4.    Neurologic: Normal gross motor and sensory exam.       Labs     CBC:   Lab Results   Component Value Date    WBC 9.6 03/16/2022    RBC 4.84 03/16/2022    HGB 15.8 03/16/2022    HCT 47.4 03/16/2022    MCV 97.8 03/16/2022    RDW 14.7 03/16/2022     03/16/2022     CMP:  Lab Results   Component Value Date     03/17/2022    K 3.2 03/17/2022    K 2.8 03/15/2022    CL 91 03/17/2022    CO2 34 03/17/2022    BUN 18 03/17/2022    CREATININE 1.4 03/17/2022    GFRAA >60 03/17/2022    GFRAA >60 06/11/2013    AGRATIO 1.1 03/17/2022    LABGLOM 51 03/17/2022    GLUCOSE 133 03/17/2022    PROT 7.0 03/17/2022    PROT 7.1 08/15/2012    CALCIUM 9.7 03/17/2022    BILITOT 0.6 03/17/2022    ALKPHOS 78 03/17/2022    AST 19 03/17/2022    ALT 20 03/17/2022     PT/INR:  No results found for: PTINR  HgBA1c:  Lab Results   Component Value Date    LABA1C 5.9 (H) 08/25/2021     Lab Results   Component Value Date    TROPONINI 0.03 (H) 03/16/2022       CURRENT Medications:  amiodarone (CORDARONE) 450 mg in dextrose 5 % 250 mL infusion, Continuous   Followed by  Funmi Liner ON 3/18/2022] amiodarone (CORDARONE) 450 mg in dextrose 5 % 250 mL infusion, Continuous  digoxin (LANOXIN) injection 250 mcg, Q2H  furosemide (LASIX) injection 40 mg, BID  0.9 % sodium chloride bolus, Once  apixaban (ELIQUIS) tablet 5 mg, BID  aspirin chewable tablet 81 mg, Daily  budesonide-formoterol (SYMBICORT) 160-4.5 MCG/ACT inhaler 2 puff, BID  ezetimibe (ZETIA) tablet 10 mg, Daily  predniSONE (DELTASONE) tablet 40 mg, Daily  pantoprazole (PROTONIX) tablet 40 mg, QAM AC        Cardiac testing     EKG: afib with RVR     Echo:   3/17/22   Summary   Left ventricular cavity size is normal.   Normal left ventricular wall thickness. Ejection fraction is severely visually estimated to be 20%. Regional wall motion abnormalities are noted. Indeterminate diastolic function. The left atrium is moderately dilated. Normal right ventricular size. Right ventricular systolic function is moderately-severely reduced. Mild tricuspid regurgitation. [facility] estimated pulmonary artery systolic pressure is normal at 32 mmHg assuming a   right atrial pressure of 8 mmHg. 2018  Summary   Left ventricular size is mildly increased. Normal left ventricular wall thickness. Left ventricular function is normal with ejection fraction estimated at   55-60 %. There is reversal of E/A inflow velocities across the mitral valve. Mild thickening of anterior leaflet of mitral valve. Trivial mitral regurgitation is present. Trivial tricuspid regurgitation. Trivial pulmonic regurgitation present. Stress Test: 2018   Summary   Baseline ECG makes stress test difficult to interpret but no clear ischemic   ECG changes were seen   frequent PVC developing during the exercise phase   Poor exercise capacity    Cath: 2015  OVERALL IMPRESSION:    1. Patent, nondominant right coronary artery. 2.  Patent left main trunk. 3.  Patent left anterior descending artery and its branches.     4.  Patent dominant circumflex artery and its branches. 5.  Normal left ventricular systolic function.     6.  Patent right femoral, common femoral artery    All above diagnostic testing was independently visualized and reviewed by me (not simply review of report)     Patient Active Problem List   Diagnosis    GERD (gastroesophageal reflux disease)    Dysthymic disorder    Neuropathy    Nontraumatic rupture of tendons of biceps (long head)    Sprain and strain of unspecified site of elbow and forearm    LORIE (generalized anxiety disorder)    Diverticulitis    Pernicious anemia    Chronic obstructive pulmonary disease (HCC)    Tobacco abuse    Chronic pain syndrome    Lung nodule    Sciatica    Numbness and tingling of both legs    Neuropathy, peroneal nerve    DDD (degenerative disc disease), lumbar    Lumbar radiculitis    Insomnia    DJD (degenerative joint disease) of knee    Foraminal stenosis of lumbar region    Back pain, chronic    Need for prophylactic vaccination and inoculation against influenza    Acute gouty arthropathy    Knee arthropathy    Edema    Cervical paraspinal muscle spasm    Pleurisy    HLD (hyperlipidemia)    COPD exacerbation (HCC)    Chest pain    Chest pain    Hypokalemia    Abnormal nuclear stress test    Uncontrolled hypertension    Atrial fibrillation ([address]    Erythrocytosis    Encounter for smoking cessation counseling    Paroxysmal atrial fibrillation with rapid ventricular response (HCC)    A-fib ([address]    Cardiomegaly    Hypomagnesemia         Assessment and Plan   1) afib with RVR   - new LV dysfunction, will d/c CCB  - start IV amio gtt   - digoxin load   - plan for DIANA/CV to NSR tomorrow afternoon, NPO after MN  - c/w eliquis     2) acute on chronic LV systolic dysfunction  - likely rate induced  - DIANA/CV to NSR  - will initiate GDMT for HF, will start toprol XL / Entresto tomorrow   - aldactone/sglt2i as outpatient   - repeat echo in 90 days for primary prevention ICD     Thank you for allowing us to participate in the care of Davina Sow.     Ada Moreno MD [id number] [facility], Interventional Cardiology and Peripheral Vascular Disease   [facility] [address]   (O): [phone]

## 2022-03-18 NOTE — PROGRESS NOTES
Hospitalist Progress Note  3/18/2022 1:23 PM  Subjective:   Admit Date: 3/15/2022  PCP: Brenden Ambriz MD  Interval History:pt is in for DIANA  Not seen  Chart reviewed. Diet: ADULT DIET; Regular; Low Sodium (2 gm); 1500 ml  Medications:   Scheduled Meds:   furosemide  40 mg IntraVENous BID    apixaban  5 mg Oral BID    aspirin  81 mg Oral Daily    budesonide-formoterol  2 puff Inhalation BID    ezetimibe  10 mg Oral Daily    predniSONE  40 mg Oral Daily    pantoprazole  40 mg Oral QAM AC     Continuous Infusions:   amiodarone 0.5 mg/min (03/18/22 0603)     CBC:   Recent Labs     03/15/22  1800 03/16/22 0728   WBC 10.5 9.6   HGB 16.7 15.8    324     BMP:    Recent Labs     03/16/22  0728 03/16/22  0728 03/16/22  2250 03/17/22 0423 03/18/22  0408     --   --  137 139   K 2.6*   < > 3.0* 3.2* 3.2*   CL 93*  --   --  91* 93*   CO2 30  --   --  34* 33*   BUN 12  --   --  18 24*   CREATININE 1.0  --   --  1.4* 1.7*   GLUCOSE 109*  --   --  133* 116*    < > = values in this interval not displayed. Hepatic:   Recent Labs     03/17/22 0423 03/18/22  0408   AST 19 56*   ALT 20 49*   BILITOT 0.6 0.5   ALKPHOS 78 76     Troponin:   Recent Labs     03/15/22  1800 03/16/22 0728   TROPONINI 0.04* 0.03*     BNP: No results for input(s): BNP in the last 72 hours. Lipids: No results for input(s): CHOL, HDL in the last 72 hours. Invalid input(s): LDLCALCU  INR: No results for input(s): INR in the last 72 hours. Objective:   Vitals: BP (!) 133/114   Pulse 94   Temp 97.8 °F (36.6 °C) (Oral)   Resp 16   Ht 5' 8\" (1.727 m)   Wt 186 lb 8.2 oz (84.6 kg)   SpO2 100%   BMI 28.36 kg/m²   General appearance:     Assessment and Plan:   1. afib with rvr- on cardizem drip,for DIANA  2. Hypokalemia-better  3.  Copd exacerbation-stable    Patient Active Problem List:     GERD (gastroesophageal reflux disease)     Dysthymic disorder     Neuropathy     Nontraumatic rupture of tendons of biceps (long head) Sprain and strain of unspecified site of elbow and forearm     LORIE (generalized anxiety disorder)     Diverticulitis     Pernicious anemia     Chronic obstructive pulmonary disease (HCC)     Tobacco abuse     Chronic pain syndrome     Lung nodule     Sciatica     Numbness and tingling of both legs     Neuropathy, peroneal nerve     DDD (degenerative disc disease), lumbar     Lumbar radiculitis     Insomnia     DJD (degenerative joint disease) of knee     Foraminal stenosis of lumbar region     Back pain, chronic     Need for prophylactic vaccination and inoculation against influenza     Acute gouty arthropathy     Knee arthropathy     Edema     Cervical paraspinal muscle spasm     Pleurisy     HLD (hyperlipidemia)     COPD exacerbation (HCC)     Chest pain     Chest pain     Hypokalemia     Abnormal nuclear stress test     Uncontrolled hypertension     Atrial fibrillation ([address]     Erythrocytosis     Encounter for smoking cessation counseling     Paroxysmal atrial fibrillation with rapid ventricular response ([address]     A-fib ([address]     Cardiomegaly     Hypomagnesemia    Pt is stable. See the orders for further plan. Will proceed further acc to pt's progress.   Time spent with management of the pt is-15 minutes      Electronically signed by: Verdia Phalen, MD, on 3/18/2022, at 1:23 PM

## 2022-03-19 LAB
A/G RATIO: 1.4 (ref 1.1–2.2)
ALBUMIN SERPL-MCNC: 4.2 G/DL (ref 3.4–5)
ALP BLD-CCNC: 101 U/L (ref 40–129)
ALT SERPL-CCNC: 92 U/L (ref 10–40)
ANION GAP SERPL CALCULATED.3IONS-SCNC: 19 MMOL/L (ref 3–16)
AST SERPL-CCNC: 69 U/L (ref 15–37)
BILIRUB SERPL-MCNC: 0.8 MG/DL (ref 0–1)
BUN BLDV-MCNC: 32 MG/DL (ref 7–20)
CALCIUM SERPL-MCNC: 9.8 MG/DL (ref 8.3–10.6)
CHLORIDE BLD-SCNC: 87 MMOL/L (ref 99–110)
CO2: 29 MMOL/L (ref 21–32)
CREAT SERPL-MCNC: 1.7 MG/DL (ref 0.8–1.3)
GFR AFRICAN AMERICAN: 49
GFR NON-AFRICAN AMERICAN: 41
GLUCOSE BLD-MCNC: 124 MG/DL (ref 70–99)
GLUCOSE BLD-MCNC: 153 MG/DL (ref 70–99)
GLUCOSE BLD-MCNC: 169 MG/DL (ref 70–99)
GLUCOSE BLD-MCNC: 180 MG/DL (ref 70–99)
GLUCOSE BLD-MCNC: 228 MG/DL (ref 70–99)
MAGNESIUM: 1.7 MG/DL (ref 1.8–2.4)
PERFORMED ON: ABNORMAL
POTASSIUM SERPL-SCNC: 3.4 MMOL/L (ref 3.5–5.1)
SODIUM BLD-SCNC: 135 MMOL/L (ref 136–145)
TOTAL PROTEIN: 7.2 G/DL (ref 6.4–8.2)

## 2022-03-19 PROCEDURE — 99223 1ST HOSP IP/OBS HIGH 75: CPT | Performed by: INTERNAL MEDICINE

## 2022-03-19 PROCEDURE — 6370000000 HC RX 637 (ALT 250 FOR IP): Performed by: INTERNAL MEDICINE

## 2022-03-19 PROCEDURE — 94760 N-INVAS EAR/PLS OXIMETRY 1: CPT

## 2022-03-19 PROCEDURE — 36415 COLL VENOUS BLD VENIPUNCTURE: CPT

## 2022-03-19 PROCEDURE — 80053 COMPREHEN METABOLIC PANEL: CPT

## 2022-03-19 PROCEDURE — 94640 AIRWAY INHALATION TREATMENT: CPT

## 2022-03-19 PROCEDURE — 6360000002 HC RX W HCPCS: Performed by: INTERNAL MEDICINE

## 2022-03-19 PROCEDURE — 83735 ASSAY OF MAGNESIUM: CPT

## 2022-03-19 PROCEDURE — 99232 SBSQ HOSP IP/OBS MODERATE 35: CPT | Performed by: INTERNAL MEDICINE

## 2022-03-19 PROCEDURE — 2580000003 HC RX 258: Performed by: INTERNAL MEDICINE

## 2022-03-19 PROCEDURE — 2060000000 HC ICU INTERMEDIATE R&B

## 2022-03-19 RX ORDER — PREDNISONE 20 MG/1
20 TABLET ORAL DAILY
Status: DISCONTINUED | OUTPATIENT
Start: 2022-03-20 | End: 2022-03-21 | Stop reason: HOSPADM

## 2022-03-19 RX ORDER — POTASSIUM CHLORIDE 20 MEQ/1
40 TABLET, EXTENDED RELEASE ORAL ONCE
Status: COMPLETED | OUTPATIENT
Start: 2022-03-19 | End: 2022-03-19

## 2022-03-19 RX ORDER — METOPROLOL SUCCINATE 50 MG/1
50 TABLET, EXTENDED RELEASE ORAL 2 TIMES DAILY
Status: DISCONTINUED | OUTPATIENT
Start: 2022-03-19 | End: 2022-03-21 | Stop reason: HOSPADM

## 2022-03-19 RX ORDER — LANOLIN ALCOHOL/MO/W.PET/CERES
400 CREAM (GRAM) TOPICAL 2 TIMES DAILY WITH MEALS
Status: DISCONTINUED | OUTPATIENT
Start: 2022-03-19 | End: 2022-03-21 | Stop reason: HOSPADM

## 2022-03-19 RX ADMIN — BUDESONIDE AND FORMOTEROL FUMARATE DIHYDRATE 2 PUFF: 160; 4.5 AEROSOL RESPIRATORY (INHALATION) at 19:35

## 2022-03-19 RX ADMIN — PANTOPRAZOLE SODIUM 40 MG: 40 TABLET, DELAYED RELEASE ORAL at 06:35

## 2022-03-19 RX ADMIN — APIXABAN 5 MG: 5 TABLET, FILM COATED ORAL at 20:20

## 2022-03-19 RX ADMIN — POTASSIUM CHLORIDE 40 MEQ: 20 TABLET, EXTENDED RELEASE ORAL at 20:20

## 2022-03-19 RX ADMIN — DILTIAZEM HYDROCHLORIDE 240 MG: 240 CAPSULE, COATED, EXTENDED RELEASE ORAL at 09:09

## 2022-03-19 RX ADMIN — METOPROLOL SUCCINATE 50 MG: 50 TABLET, EXTENDED RELEASE ORAL at 20:21

## 2022-03-19 RX ADMIN — EZETIMIBE 10 MG: 10 TABLET ORAL at 09:09

## 2022-03-19 RX ADMIN — FUROSEMIDE 40 MG: 10 INJECTION, SOLUTION INTRAMUSCULAR; INTRAVENOUS at 09:09

## 2022-03-19 RX ADMIN — BUDESONIDE AND FORMOTEROL FUMARATE DIHYDRATE 2 PUFF: 160; 4.5 AEROSOL RESPIRATORY (INHALATION) at 08:08

## 2022-03-19 RX ADMIN — APIXABAN 5 MG: 5 TABLET, FILM COATED ORAL at 09:09

## 2022-03-19 RX ADMIN — AMIODARONE HYDROCHLORIDE 0.5 MG/MIN: 50 INJECTION, SOLUTION INTRAVENOUS at 00:14

## 2022-03-19 RX ADMIN — Medication 400 MG: at 20:20

## 2022-03-19 RX ADMIN — ASPIRIN 81 MG 81 MG: 81 TABLET ORAL at 09:09

## 2022-03-19 RX ADMIN — FUROSEMIDE 40 MG: 10 INJECTION, SOLUTION INTRAMUSCULAR; INTRAVENOUS at 17:04

## 2022-03-19 RX ADMIN — PREDNISONE 40 MG: 20 TABLET ORAL at 09:09

## 2022-03-19 RX ADMIN — HYDROCHLOROTHIAZIDE 25 MG: 25 TABLET ORAL at 09:09

## 2022-03-19 RX ADMIN — AMIODARONE HYDROCHLORIDE 0.5 MG/MIN: 50 INJECTION, SOLUTION INTRAVENOUS at 18:39

## 2022-03-19 ASSESSMENT — PAIN SCALES - GENERAL
PAINLEVEL_OUTOF10: 0
PAINLEVEL_OUTOF10: 0

## 2022-03-19 NOTE — PLAN OF CARE
Fall risk assessment completed every shift. All precautions in place. Pt has call light within reach at all times. Room clear of clutter. Pt aware to call for assistance when getting up. Skin assessment completed every shift. Pt assessed for incontinence, appropriate barrier cream applied prn. Pt encouraged to turn/rotate every 2 hours. Assistance provided if pt unable to do so themselves. Pain/discomfort being managed with PRN analgesics per MD orders. Pt able to express presence and absence of pain and rate pain appropriately using numerical scale. Intake/Output Summary (Last 24 hours) at 3/19/2022 0958  Last data filed at 3/19/2022 0426  Gross per 24 hour   Intake 1604.18 ml   Output 2350 ml   Net -745.82 ml     Vitals:    03/19/22 0906   BP: (!) 142/82   Pulse:    Resp:    Temp: 97.8 °F (36.6 °C)   SpO2: 90%     Patient assessed for fall risk; fall precautions initiated. Patient and family instructed about safety devices. Environment kept free of clutter and adequate lighting provided. Bed locked and in lowest position. Call light within reach. Will continue to monitor.

## 2022-03-19 NOTE — PROGRESS NOTES
Cardiac Electrophysiology Progress Note     Admit Date: 3/15/2022     Reason for follow up: atrial fibrillation, heart failure    HPI and Interval History:   Patient seen and examined. Clinical notes reviewed. Telemetry reviewed. No new complaint today. No major events overnight. Denies having angina, shortness of breath, dyspnea on exertion, Orthopnea, PND at the time of this visit. Review of System:  Pertinent negatives and positives are mentioned in the HPI and interval history above. The rest are negative. Physical Examination:  Vitals:    22 0906   BP: (!) 142/82   Pulse:    Resp:    Temp: 97.8 °F (36.6 °C)   SpO2: 90%        Intake/Output Summary (Last 24 hours) at 3/19/2022 1441  Last data filed at 3/19/2022 1320  Gross per 24 hour   Intake 1730 ml   Output 1400 ml   Net 330 ml     In: 2324.2 [P.O.:1780; I.V.:544.2]  Out: 2650    Wt Readings from Last 3 Encounters:   22 185 lb 3 oz (84 kg)   03/15/22 195 lb (88.5 kg)   22 196 lb (88.9 kg)     Temp  Av.8 °F (36.6 °C)  Min: 97.7 °F (36.5 °C)  Max: 97.9 °F (36.6 °C)  Pulse  Av.3  Min: 104  Max: 121  BP  Min: 124/88  Max: 148/96  SpO2  Av.2 %  Min: 90 %  Max: 92 %    · Telemetry: Atrial fibrillation with RVR  · Constitutional: Alert. Oriented to person, place, and time. No distress. · HEENT: Normocephalic atraumatic. PEERLA, mucosa moist.   · Neck: supple. · Cardiovascular: Irregularly irregular, tachycardic. No s3.      · Pulmonary/Chest: Bilateral respiratory sounds present. No respiratory accessory muscle use. No wheezes, No rhonchi. No rales. · Abdominal: Soft. Normal bowel sounds present. · Musculoskeletal: No tenderness. Has pitting edema. · Neurological: Alert and oriented. Grossly intact with no obvious focal neurological deficit. · Skin: Skin is warm and dry. · Psychiatric: No anxiety nor agitation. Labs, diagnostic and imaging results reviewed. Reviewed.    Recent Labs 03/17/22  0423 03/18/22  0408 03/19/22  0953    139 135*   K 3.2* 3.2* 3.4*   CL 91* 93* 87*   CO2 34* 33* 29   BUN 18 24* 32*   CREATININE 1.4* 1.7* 1.7*     No results for input(s): WBC, HGB, HCT, MCV, PLT in the last 72 hours. Lab Results   Component Value Date    TROPONINI 0.03 03/16/2022    TROPONINI 0.04 03/15/2022    TROPONINI <0.01 06/12/2019     Estimated Creatinine Clearance: 46 mL/min (A) (based on SCr of 1.7 mg/dL (H)). No results found for: BNP  Lab Results   Component Value Date    PROTIME 10.7 06/12/2019    PROTIME 10.9 02/08/2018    PROTIME 11.7 08/15/2012    INR 0.94 06/12/2019    INR 0.96 02/08/2018    INR 1.03 08/15/2012     Lab Results   Component Value Date    CHOL 285 08/25/2021    HDL 44 08/25/2021    TRIG 249 08/25/2021       I independently reviewed the ECG, telemetry, and serology results.     Scheduled Meds:   [START ON 3/20/2022] predniSONE  20 mg Oral Daily    dilTIAZem  240 mg Oral Daily    hydroCHLOROthiazide  25 mg Oral Daily    furosemide  40 mg IntraVENous BID    apixaban  5 mg Oral BID    aspirin  81 mg Oral Daily    budesonide-formoterol  2 puff Inhalation BID    ezetimibe  10 mg Oral Daily    pantoprazole  40 mg Oral QAM AC     Continuous Infusions:   amiodarone 0.5 mg/min (03/19/22 0014)     PRN Meds:     Patient Active Problem List    Diagnosis Date Noted    Cardiomegaly     Hypomagnesemia     Atrial fibrillation with RVR ([facility] 03/15/2022    Erythrocytosis     Encounter for smoking cessation counseling     Paroxysmal atrial fibrillation with rapid ventricular response ([email or web address]     Uncontrolled hypertension 06/12/2019    Atrial fibrillation ([facility] 06/12/2019    Abnormal nuclear stress test 02/12/2015    Hypokalemia 02/11/2015    Chest pain 02/10/2015    COPD exacerbation ([facility] 09/10/2014    Chest pain 09/10/2014    HLD (hyperlipidemia) 09/04/2014    Pleurisy 09/03/2014    Cervical paraspinal muscle spasm 07/30/2014    Knee arthropathy 12/04/2013  Edema 12/04/2013    Acute gouty arthropathy 12/01/2013    Need for prophylactic vaccination and inoculation against influenza 10/02/2013    Back pain, chronic 05/01/2013    DDD (degenerative disc disease), lumbar     Lumbar radiculitis     Insomnia     DJD (degenerative joint disease) of knee     Foraminal stenosis of lumbar region     Neuropathy, peroneal nerve 10/08/2012    Sciatica     Numbness and tingling of both legs     Tobacco abuse 06/06/2012    Chronic pain syndrome 06/06/2012    Lung nodule 06/06/2012    GERD (gastroesophageal reflux disease) 06/05/2012    Dysthymic disorder 06/05/2012    Neuropathy 06/05/2012    Nontraumatic rupture of tendons of biceps (long head) 06/05/2012    Sprain and strain of unspecified site of elbow and forearm 06/05/2012    LORIE (generalized anxiety disorder) 06/05/2012    Diverticulitis 06/05/2012    Pernicious anemia 06/05/2012    Chronic obstructive pulmonary disease ([facility] 06/05/2012      Active Hospital Problems    Diagnosis Date Noted    Cardiomegaly [I51.7]     Hypomagnesemia [E83.42]     Atrial fibrillation with RVR ([facility] [I48.91] 03/15/2022       Assessment and Plan:   HFrEF - severely depressed LV systolic function. - feeling much better with diuresis. Hold diuresis today, Cr up at 1.7.  - strict I/O and daily standing weights.   - depressed systolic function presumed to be rate related. - started on GDMT (toprolol). - one Cr improves then ACEi  - SGLT-2i/aldactone as outpatient. - repeat TTE in 90 days for consideration of ICD for primary prevention. Atrial fibrillation with RVR  - s/p DIANA and DCCV x3 (unsuccessfully). - amiodarone loading this weekend. - if rate <110 consistently, would continue amiodarone loading for 3 weeks then repeat DCCV. If rates still elevated, repeat cardioversion early next week after.   - continue anticoagulation with apixaban 5 mg bid. - start metoprolol succinate 50 mg bid.  Will discontinue diltiazem given depressed LV systolic function. Thank you for allowing me to participate in the care of this patient. If you have any questions, please do not hesitate to contact me.     Electronically signed by Bird Whitley MD on 3/15/2022 at 2:41 PM.    Bird Whitley MD  Cardiac Electrophysiology  [address]

## 2022-03-19 NOTE — PROGRESS NOTES
Hospitalist Progress Note  3/19/2022 9:36 AM  Subjective:   Admit Date: 3/15/2022  PCP: Samia Rivera MD  Interval History: pt feels better  Cardioversion was unsuccessful  No complaints  Chart reviewed. Diet: ADULT DIET; Regular; Low Sodium (2 gm); 1500 ml  Medications:   Scheduled Meds:   [START ON 3/20/2022] predniSONE  20 mg Oral Daily    dilTIAZem  240 mg Oral Daily    hydroCHLOROthiazide  25 mg Oral Daily    furosemide  40 mg IntraVENous BID    apixaban  5 mg Oral BID    aspirin  81 mg Oral Daily    budesonide-formoterol  2 puff Inhalation BID    ezetimibe  10 mg Oral Daily    pantoprazole  40 mg Oral QAM AC     Continuous Infusions:   amiodarone 0.5 mg/min (03/19/22 0014)     CBC:   No results for input(s): WBC, HGB, PLT in the last 72 hours. BMP:    Recent Labs     03/16/22  2250 03/17/22  0423 03/18/22  0408   NA  --  137 139   K 3.0* 3.2* 3.2*   CL  --  91* 93*   CO2  --  34* 33*   BUN  --  18 24*   CREATININE  --  1.4* 1.7*   GLUCOSE  --  133* 116*     Hepatic:   Recent Labs     03/17/22  0423 03/18/22  0408   AST 19 56*   ALT 20 49*   BILITOT 0.6 0.5   ALKPHOS 78 76     Troponin:   No results for input(s): TROPONINI in the last 72 hours. BNP: No results for input(s): BNP in the last 72 hours. Lipids: No results for input(s): CHOL, HDL in the last 72 hours. Invalid input(s): LDLCALCU  INR: No results for input(s): INR in the last 72 hours. Objective:   Vitals: BP (!) 142/82   Pulse 121   Temp 97.8 °F (36.6 °C) (Oral)   Resp 25   Ht 5' 8\" (1.727 m)   Wt 185 lb 3 oz (84 kg)   SpO2 90%   BMI 28.16 kg/m²   General appearance: alert,awake,oriented x 3 and cooperative with exam  HEENT: Head: Normal, normocephalic, atraumatic, anicteric, pupils are reactive to light. Dry mucous membrane.   Neck: no adenopathy, no carotid bruit, supple, symmetrical, trachea midline and thyroid not enlarged, symmetric, no tenderness/mass/nodules  Lungs: clear to auscultation bilaterally  Heart: regular rate and rhythm, S1, S2 normal, no murmur, click, rub or gallop  Abdomen: soft, non-tender; bowel sounds normal; no masses,  no organomegaly  Extremities: extremities mild edema  Neurologic: Mental status: Alert, oriented, thought content appropriate    Assessment and Plan:   1. afib with rvr- slightly better  2. Hypokalemia-replace  3. Copd exacerbation-stable    Patient Active Problem List:     GERD (gastroesophageal reflux disease)     Dysthymic disorder     Neuropathy     Nontraumatic rupture of tendons of biceps (long head)     Sprain and strain of unspecified site of elbow and forearm     LORIE (generalized anxiety disorder)     Diverticulitis     Pernicious anemia     Chronic obstructive pulmonary disease (HCC)     Tobacco abuse     Chronic pain syndrome     Lung nodule     Sciatica     Numbness and tingling of both legs     Neuropathy, peroneal nerve     DDD (degenerative disc disease), lumbar     Lumbar radiculitis     Insomnia     DJD (degenerative joint disease) of knee     Foraminal stenosis of lumbar region     Back pain, chronic     Need for prophylactic vaccination and inoculation against influenza     Acute gouty arthropathy     Knee arthropathy     Edema     Cervical paraspinal muscle spasm     Pleurisy     HLD (hyperlipidemia)     COPD exacerbation (HCC)     Chest pain     Chest pain     Hypokalemia     Abnormal nuclear stress test     Uncontrolled hypertension     Atrial fibrillation ([address]     Erythrocytosis     Encounter for smoking cessation counseling     Paroxysmal atrial fibrillation with rapid ventricular response ([address]     A-fib ([address]     Cardiomegaly     Hypomagnesemia    Pt is stable. Discussed with staff and pt about the plan. See the orders for further plan. Will proceed further acc to pt's progress.   Time spent with management of the pt is-30 minutes      Electronically signed by: Mark Norton MD, on 3/19/2022, at 9:36 AM

## 2022-03-19 NOTE — PROCEDURES
[facility]           [address]                                 PROCEDURE NOTE    PATIENT NAME: Fredo Suh                :        1955  MED REC NO:   [id number]                          ROOM:       2524  ACCOUNT NO:   [de-identified]                           ADMIT DATE: 03/15/2022  PROVIDER:     Silvia Vargas MD      DATE OF PROCEDURE:  2022    INDICATION FOR PROCEDURE:  Atrial fibrillation with rapid ventricular  response. PROCEDURE:  The risks and benefits of the procedure were explained to  the patient and informed consent was obtained. He was brought to the  cardiac catheterization lab recovery area and placed in supine position. Emergency equipment was in place. The patient had an ASA grade of III  and Mallampati score of II. He received deep sedation with 3 mg of IV  Versed and 150 mcg of fentanyl. Vital signs were monitored throughout  the sedation period and remained stable. There were no complications  from sedation which was administered by an independent agent at my  direction and supervision. I was present the entire time. The transesophageal echocardiography probe was passed without difficulty  in the patient's posterior oropharynx and into the distal esophagus. Image acquisition was performed. The transesophageal echocardiography  probe was then withdrawn. The patient was then given deep sedation with  a total 60 mg of IV Brevital administered by me. Deep sedation was  achieved. He received a total of three 200 J synchronized biphasic  cardioversion shocks. All of these were unsuccessful in converting the  patient to a normal sinus rhythm. Further attempts were abandoned. The  patient recovered from anesthesia with no complications. There was no  blood loss with either of these procedures. The patient went to recovery with no neurologic deficits. FINDINGS:  1. Successful transesophageal echocardiography with no evidence of  thrombus in left atrium, left atrial appendage or left ventricle. 2.  Unsuccessful attempt x3 at synchronized cardioversion from atrial  fibrillation with rapid ventricular response to a normal sinus rhythm. We will load the patient with 300 mg additional IV amiodarone.         Grace Thompson MD    D: 03/18/2022 14:24:40       T: 03/18/2022 14:29:23     [email or web address]  Job#: [id number]     Doc#: [id number]    CC:

## 2022-03-20 LAB
ANION GAP SERPL CALCULATED.3IONS-SCNC: 14 MMOL/L (ref 3–16)
BUN BLDV-MCNC: 47 MG/DL (ref 7–20)
CALCIUM SERPL-MCNC: 9.7 MG/DL (ref 8.3–10.6)
CHLORIDE BLD-SCNC: 87 MMOL/L (ref 99–110)
CO2: 32 MMOL/L (ref 21–32)
CREAT SERPL-MCNC: 1.7 MG/DL (ref 0.8–1.3)
GFR AFRICAN AMERICAN: 49
GFR NON-AFRICAN AMERICAN: 41
GLUCOSE BLD-MCNC: 116 MG/DL (ref 70–99)
GLUCOSE BLD-MCNC: 93 MG/DL (ref 70–99)
PERFORMED ON: NORMAL
POTASSIUM SERPL-SCNC: 3.8 MMOL/L (ref 3.5–5.1)
SODIUM BLD-SCNC: 133 MMOL/L (ref 136–145)

## 2022-03-20 PROCEDURE — 2060000000 HC ICU INTERMEDIATE R&B

## 2022-03-20 PROCEDURE — 2580000003 HC RX 258: Performed by: INTERNAL MEDICINE

## 2022-03-20 PROCEDURE — 6370000000 HC RX 637 (ALT 250 FOR IP): Performed by: INTERNAL MEDICINE

## 2022-03-20 PROCEDURE — 6360000002 HC RX W HCPCS: Performed by: INTERNAL MEDICINE

## 2022-03-20 PROCEDURE — 80048 BASIC METABOLIC PNL TOTAL CA: CPT

## 2022-03-20 PROCEDURE — 99233 SBSQ HOSP IP/OBS HIGH 50: CPT | Performed by: INTERNAL MEDICINE

## 2022-03-20 PROCEDURE — 94640 AIRWAY INHALATION TREATMENT: CPT

## 2022-03-20 PROCEDURE — 99232 SBSQ HOSP IP/OBS MODERATE 35: CPT | Performed by: INTERNAL MEDICINE

## 2022-03-20 PROCEDURE — 94760 N-INVAS EAR/PLS OXIMETRY 1: CPT

## 2022-03-20 PROCEDURE — 36415 COLL VENOUS BLD VENIPUNCTURE: CPT

## 2022-03-20 RX ORDER — AMIODARONE HYDROCHLORIDE 200 MG/1
400 TABLET ORAL 2 TIMES DAILY
Status: DISCONTINUED | OUTPATIENT
Start: 2022-03-20 | End: 2022-03-21 | Stop reason: HOSPADM

## 2022-03-20 RX ORDER — LISINOPRIL 5 MG/1
5 TABLET ORAL DAILY
Status: DISCONTINUED | OUTPATIENT
Start: 2022-03-20 | End: 2022-03-21 | Stop reason: HOSPADM

## 2022-03-20 RX ORDER — AMIODARONE HYDROCHLORIDE 200 MG/1
200 TABLET ORAL DAILY
Status: DISCONTINUED | OUTPATIENT
Start: 2022-03-30 | End: 2022-03-21 | Stop reason: HOSPADM

## 2022-03-20 RX ORDER — TORSEMIDE 20 MG/1
20 TABLET ORAL DAILY
Status: DISCONTINUED | OUTPATIENT
Start: 2022-03-20 | End: 2022-03-21 | Stop reason: HOSPADM

## 2022-03-20 RX ORDER — POTASSIUM CHLORIDE 20 MEQ/1
20 TABLET, EXTENDED RELEASE ORAL 2 TIMES DAILY WITH MEALS
Status: DISCONTINUED | OUTPATIENT
Start: 2022-03-20 | End: 2022-03-21 | Stop reason: HOSPADM

## 2022-03-20 RX ADMIN — Medication 400 MG: at 17:55

## 2022-03-20 RX ADMIN — AMIODARONE HYDROCHLORIDE 0.5 MG/MIN: 50 INJECTION, SOLUTION INTRAVENOUS at 09:05

## 2022-03-20 RX ADMIN — AMIODARONE HYDROCHLORIDE 400 MG: 200 TABLET ORAL at 12:12

## 2022-03-20 RX ADMIN — BUDESONIDE AND FORMOTEROL FUMARATE DIHYDRATE 2 PUFF: 160; 4.5 AEROSOL RESPIRATORY (INHALATION) at 08:23

## 2022-03-20 RX ADMIN — Medication 400 MG: at 08:45

## 2022-03-20 RX ADMIN — LISINOPRIL 5 MG: 5 TABLET ORAL at 12:12

## 2022-03-20 RX ADMIN — METOPROLOL SUCCINATE 50 MG: 50 TABLET, EXTENDED RELEASE ORAL at 08:45

## 2022-03-20 RX ADMIN — HYDROCHLOROTHIAZIDE 25 MG: 25 TABLET ORAL at 08:45

## 2022-03-20 RX ADMIN — PANTOPRAZOLE SODIUM 40 MG: 40 TABLET, DELAYED RELEASE ORAL at 05:06

## 2022-03-20 RX ADMIN — AMIODARONE HYDROCHLORIDE 400 MG: 200 TABLET ORAL at 20:29

## 2022-03-20 RX ADMIN — METOPROLOL SUCCINATE 50 MG: 50 TABLET, EXTENDED RELEASE ORAL at 20:28

## 2022-03-20 RX ADMIN — POTASSIUM CHLORIDE 20 MEQ: 20 TABLET, EXTENDED RELEASE ORAL at 17:55

## 2022-03-20 RX ADMIN — TORSEMIDE 20 MG: 20 TABLET ORAL at 12:12

## 2022-03-20 RX ADMIN — EZETIMIBE 10 MG: 10 TABLET ORAL at 08:45

## 2022-03-20 RX ADMIN — APIXABAN 5 MG: 5 TABLET, FILM COATED ORAL at 08:45

## 2022-03-20 RX ADMIN — APIXABAN 5 MG: 5 TABLET, FILM COATED ORAL at 20:28

## 2022-03-20 RX ADMIN — PREDNISONE 20 MG: 20 TABLET ORAL at 08:45

## 2022-03-20 RX ADMIN — POTASSIUM CHLORIDE 20 MEQ: 20 TABLET, EXTENDED RELEASE ORAL at 09:53

## 2022-03-20 RX ADMIN — FUROSEMIDE 40 MG: 10 INJECTION, SOLUTION INTRAMUSCULAR; INTRAVENOUS at 08:45

## 2022-03-20 RX ADMIN — BUDESONIDE AND FORMOTEROL FUMARATE DIHYDRATE 2 PUFF: 160; 4.5 AEROSOL RESPIRATORY (INHALATION) at 19:55

## 2022-03-20 ASSESSMENT — PAIN SCALES - GENERAL
PAINLEVEL_OUTOF10: 0

## 2022-03-20 NOTE — PLAN OF CARE
Problem: Falls - Risk of:  Goal: Will remain free from falls  Description: Will remain free from falls  3/19/2022 2252 by Elvia Diallo RN  Outcome: Ongoing     Problem: OXYGENATION/RESPIRATORY FUNCTION  Goal: Patient will maintain patent airway  3/19/2022 2252 by Elvia Diallo RN  Outcome: Ongoing     Problem: HEMODYNAMIC STATUS  Goal: Patient has stable vital signs and fluid balance  3/19/2022 2252 by Elvia Diallo RN  Outcome: Ongoing     Problem: FLUID AND ELECTROLYTE IMBALANCE  Goal: Fluid and electrolyte balance are achieved/maintained  3/19/2022 2252 by Elvia Diallo RN  Outcome: Ongoing     Problem: ACTIVITY INTOLERANCE/IMPAIRED MOBILITY  Goal: Mobility/activity is maintained at optimum level for patient  3/19/2022 2252 by Elvia Diallo RN  Outcome: Ongoing

## 2022-03-20 NOTE — PROGRESS NOTES
Hospitalist Progress Note  3/20/2022 9:15 AM  Subjective:   Admit Date: 3/15/2022  PCP: Brenden Ambriz MD  Interval History: pt feels better  No complaints  Chart reviewed. Diet: ADULT DIET; Regular; Low Sodium (2 gm); 1500 ml  Medications:   Scheduled Meds:   potassium chloride  20 mEq Oral BID WC    predniSONE  20 mg Oral Daily    metoprolol succinate  50 mg Oral BID    magnesium oxide  400 mg Oral BID WC    hydroCHLOROthiazide  25 mg Oral Daily    furosemide  40 mg IntraVENous BID    apixaban  5 mg Oral BID    budesonide-formoterol  2 puff Inhalation BID    ezetimibe  10 mg Oral Daily    pantoprazole  40 mg Oral QAM AC     Continuous Infusions:   amiodarone 0.5 mg/min (03/20/22 0905)     CBC:   No results for input(s): WBC, HGB, PLT in the last 72 hours. BMP:    Recent Labs     03/18/22  0408 03/19/22  0953    135*   K 3.2* 3.4*   CL 93* 87*   CO2 33* 29   BUN 24* 32*   CREATININE 1.7* 1.7*   GLUCOSE 116* 153*     Hepatic:   Recent Labs     03/18/22  0408 03/19/22  0953   AST 56* 69*   ALT 49* 92*   BILITOT 0.5 0.8   ALKPHOS 76 101     Troponin:   No results for input(s): TROPONINI in the last 72 hours. BNP: No results for input(s): BNP in the last 72 hours. Lipids: No results for input(s): CHOL, HDL in the last 72 hours. Invalid input(s): LDLCALCU  INR: No results for input(s): INR in the last 72 hours. Objective:   Vitals: /79   Pulse 96   Temp 97.7 °F (36.5 °C) (Oral)   Resp 13   Ht 5' 8\" (1.727 m)   Wt 186 lb 4.6 oz (84.5 kg)   SpO2 91%   BMI 28.33 kg/m²   General appearance: alert,awake,oriented x 3 and cooperative with exam  HEENT: Head: Normal, normocephalic, atraumatic, anicteric, pupils are reactive to light. Dry mucous membrane.   Neck: no adenopathy, no carotid bruit, supple, symmetrical, trachea midline and thyroid not enlarged, symmetric, no tenderness/mass/nodules  Lungs: clear to auscultation bilaterally  Heart: regular rate and rhythm, S1, S2 normal, no

## 2022-03-20 NOTE — PROGRESS NOTES
Cardiac Electrophysiology Progress Note     Admit Date: 3/15/2022     Reason for follow up: atrial fibrillation, heart failure    HPI and Interval History:   Patient seen and examined. Clinical notes reviewed. Telemetry reviewed. No new complaint today. No major events overnight. SOB has significantly improved. Review of System:  Pertinent negatives and positives are mentioned in the HPI and interval history above. The rest are negative. Physical Examination:  Vitals:    22 0805   BP: 133/79   Pulse: 96   Resp: 13   Temp: 97.7 °F (36.5 °C)   SpO2: 91%        Intake/Output Summary (Last 24 hours) at 3/20/2022 1047  Last data filed at 3/20/2022 0237  Gross per 24 hour   Intake 1750 ml   Output 1250 ml   Net 500 ml     In: 0 [P.O.:]  Out: 1250    Wt Readings from Last 3 Encounters:   22 186 lb 4.6 oz (84.5 kg)   03/15/22 195 lb (88.5 kg)   22 196 lb (88.9 kg)     Temp  Av.7 °F (36.5 °C)  Min: 97.6 °F (36.4 °C)  Max: 97.7 °F (36.5 °C)  Pulse  Av.3  Min: 95  Max: 124  BP  Min: 118/88  Max: 133/79  SpO2  Av %  Min: 90 %  Max: 98 %    · Telemetry: Atrial fibrillation with RVR  · Constitutional: Alert. Oriented to person, place, and time. No distress. · HEENT: Normocephalic atraumatic. PEERLA, mucosa moist.   · Neck: supple. · Cardiovascular: Irregularly irregular, controlled rate. No S3.      · Pulmonary/Chest: Bilateral respiratory sounds present. No respiratory accessory muscle use. No wheezes, No rhonchi. No rales. · Abdominal: Soft. Normal bowel sounds present. · Musculoskeletal: No tenderness. Has pitting edema. · Neurological: Alert and oriented. Grossly intact with no obvious focal neurological deficit. · Skin: Skin is warm and dry. · Psychiatric: No anxiety nor agitation. Labs, diagnostic and imaging results reviewed. Reviewed.    Recent Labs     22  0408 22  0953    135*   K 3.2* 3.4*   CL 93* 87*   CO2 33* 29   BUN 24* 32*   CREATININE 1.7* 1.7*     No results for input(s): WBC, HGB, HCT, MCV, PLT in the last 72 hours. Lab Results   Component Value Date    TROPONINI 0.03 03/16/2022    TROPONINI 0.04 03/15/2022    TROPONINI <0.01 06/12/2019     Estimated Creatinine Clearance: 46 mL/min (A) (based on SCr of 1.7 mg/dL (H)). No results found for: BNP  Lab Results   Component Value Date    PROTIME 10.7 06/12/2019    PROTIME 10.9 02/08/2018    PROTIME 11.7 08/15/2012    INR 0.94 06/12/2019    INR 0.96 02/08/2018    INR 1.03 08/15/2012     Lab Results   Component Value Date    CHOL 285 08/25/2021    HDL 44 08/25/2021    TRIG 249 08/25/2021       I independently reviewed the ECG, telemetry, and serology results.     Scheduled Meds:   potassium chloride  20 mEq Oral BID WC    predniSONE  20 mg Oral Daily    metoprolol succinate  50 mg Oral BID    magnesium oxide  400 mg Oral BID WC    hydroCHLOROthiazide  25 mg Oral Daily    furosemide  40 mg IntraVENous BID    apixaban  5 mg Oral BID    budesonide-formoterol  2 puff Inhalation BID    ezetimibe  10 mg Oral Daily    pantoprazole  40 mg Oral QAM AC     Continuous Infusions:   amiodarone 0.5 mg/min (03/20/22 0905)     PRN Meds:     Patient Active Problem List    Diagnosis Date Noted    Cardiomegaly     Hypomagnesemia     Atrial fibrillation with RVR ([facility] [address] 03/15/2022    Erythrocytosis     Encounter for smoking cessation counseling     Paroxysmal atrial fibrillation with rapid ventricular response (HCC)     Uncontrolled hypertension 06/12/2019    Atrial fibrillation ([facility] [address] 06/12/2019    Abnormal nuclear stress test 02/12/2015    Hypokalemia 02/11/2015    Chest pain 02/10/2015    COPD exacerbation ([facility] [address] 09/10/2014    Chest pain 09/10/2014    HLD (hyperlipidemia) 09/04/2014    Pleurisy 09/03/2014    Cervical paraspinal muscle spasm 07/30/2014    Knee arthropathy 12/04/2013    Edema 12/04/2013    Acute gouty arthropathy 12/01/2013    Need for prophylactic vaccination and inoculation against influenza 10/02/2013    Back pain, chronic 05/01/2013    DDD (degenerative disc disease), lumbar     Lumbar radiculitis     Insomnia     DJD (degenerative joint disease) of knee     Foraminal stenosis of lumbar region     Neuropathy, peroneal nerve 10/08/2012    Sciatica     Numbness and tingling of both legs     Tobacco abuse 06/06/2012    Chronic pain syndrome 06/06/2012    Lung nodule 06/06/2012    GERD (gastroesophageal reflux disease) 06/05/2012    Dysthymic disorder 06/05/2012    Neuropathy 06/05/2012    Nontraumatic rupture of tendons of biceps (long head) 06/05/2012    Sprain and strain of unspecified site of elbow and forearm 06/05/2012    LORIE (generalized anxiety disorder) 06/05/2012    Diverticulitis 06/05/2012    Pernicious anemia 06/05/2012    Chronic obstructive pulmonary disease ([facility] [address] 06/05/2012      Active Hospital Problems    Diagnosis Date Noted    Cardiomegaly [I51.7]     Hypomagnesemia [E83.42]     Atrial fibrillation with RVR ([facility] [address] [I48.91] 03/15/2022       Assessment and Plan:   HFrEF - severely depressed LV systolic function. - depressed systolic function presumed to be rate related. Last normal LV function 2/2018. Last normal angiogram 2/2015.     - Continue metoprolol succinate 50 mg bid. - Discontinue amlodipine and start lisinopril 5 mg daily. - check BMP today.    - SGLT-2i/aldactone as outpatient. - repeat TTE in 90 days, if LVEF still <35% then Cor angiogram (last normal was 7 years ago). If NICM then ICD for primary prevention. Atrial fibrillation with RVR  - s/p DIANA and DCCV x3 (unsuccessfully). - HR much better today. Discontinue IV amiodarone, start amiodarone 400 mg bid for 10 days (till 3/29/22) then 200 mg daily. - Cardioversion with Dr. Molly Carter in 3 weeks on uninterrupted anticoagulation and EP follow up in 6-8 weeks. - continue anticoagulation with apixaban 5 mg bid.    - continue metoprolol succinate. We discussed treatment options including antiarrhythmics, rate control with anticoagulation, and ablation. We discussed progressive nature of atrial fibrillation. Treatment success decreases when AF becomes persistent and last more than 6 months. Antiarrhythmic therapy, side effects, benefits and alternative discussed. Atrial fibrillation ablation procedure was discussed. We discussed the need for repeat procedure. On average patients may need more than one ablation procedure. Risks associated with ablation include but not limited to allergic reaction to the medications, pain, bleeding, infection, nerve injury, injury to diaphragm(breathing muscle), pulmonary embolus(blood clot in lungs), deep vein blood clot, pneumothorax, hemothorax, acute renal failure, cardiac perforation,  tamponade, need for emergent surgery (open heart), permanent pacemaker, pulmonary vein stenosis, left atrial to esophageal fistula, stroke, myocardial infarction and death. Difference between atrial fibrillation and atrial flutter discussed and treatment discussed. I will see the patient and his wife in clinic where we can further discuss treatment of atrial fibrillation. Amiodarone is temporary and should be discontinued in a few months. Cardiology/EP will sign off. Thank you for allowing me to participate in the care of this patient. If you have any questions, please do not hesitate to contact me.     Electronically signed by Lorena Cline MD on 3/15/2022 at 10:47 AM.    Lorena Cline MD  Cardiac Electrophysiology  [place] [id number]

## 2022-03-21 ENCOUNTER — TELEPHONE (OUTPATIENT)
Dept: CARDIOLOGY CLINIC | Age: 67
End: 2022-03-21

## 2022-03-21 VITALS
WEIGHT: 182.54 LBS | OXYGEN SATURATION: 93 % | DIASTOLIC BLOOD PRESSURE: 90 MMHG | HEIGHT: 68 IN | SYSTOLIC BLOOD PRESSURE: 133 MMHG | BODY MASS INDEX: 27.67 KG/M2 | TEMPERATURE: 97.7 F | HEART RATE: 87 BPM | RESPIRATION RATE: 21 BRPM

## 2022-03-21 DIAGNOSIS — I48.91 ATRIAL FIBRILLATION WITH RVR (HCC): Primary | ICD-10-CM

## 2022-03-21 LAB
A/G RATIO: 1.5 (ref 1.1–2.2)
ALBUMIN SERPL-MCNC: 3.9 G/DL (ref 3.4–5)
ALP BLD-CCNC: 93 U/L (ref 40–129)
ALT SERPL-CCNC: 74 U/L (ref 10–40)
ANION GAP SERPL CALCULATED.3IONS-SCNC: 16 MMOL/L (ref 3–16)
AST SERPL-CCNC: 34 U/L (ref 15–37)
BILIRUB SERPL-MCNC: 0.3 MG/DL (ref 0–1)
BUN BLDV-MCNC: 48 MG/DL (ref 7–20)
CALCIUM SERPL-MCNC: 9.8 MG/DL (ref 8.3–10.6)
CHLORIDE BLD-SCNC: 91 MMOL/L (ref 99–110)
CO2: 31 MMOL/L (ref 21–32)
CREAT SERPL-MCNC: 1.7 MG/DL (ref 0.8–1.3)
GFR AFRICAN AMERICAN: 49
GFR NON-AFRICAN AMERICAN: 41
GLUCOSE BLD-MCNC: 100 MG/DL (ref 70–99)
HCT VFR BLD CALC: 54.8 % (ref 40.5–52.5)
HEMOGLOBIN: 18.4 G/DL (ref 13.5–17.5)
MAGNESIUM: 1.9 MG/DL (ref 1.8–2.4)
MCH RBC QN AUTO: 32.8 PG (ref 26–34)
MCHC RBC AUTO-ENTMCNC: 33.6 G/DL (ref 31–36)
MCV RBC AUTO: 97.7 FL (ref 80–100)
PDW BLD-RTO: 14.7 % (ref 12.4–15.4)
PLATELET # BLD: 337 K/UL (ref 135–450)
PMV BLD AUTO: 9.8 FL (ref 5–10.5)
POTASSIUM SERPL-SCNC: 3.6 MMOL/L (ref 3.5–5.1)
RBC # BLD: 5.61 M/UL (ref 4.2–5.9)
SODIUM BLD-SCNC: 138 MMOL/L (ref 136–145)
TOTAL PROTEIN: 6.5 G/DL (ref 6.4–8.2)
WBC # BLD: 17 K/UL (ref 4–11)

## 2022-03-21 PROCEDURE — 99238 HOSP IP/OBS DSCHRG MGMT 30/<: CPT | Performed by: INTERNAL MEDICINE

## 2022-03-21 PROCEDURE — 94761 N-INVAS EAR/PLS OXIMETRY MLT: CPT

## 2022-03-21 PROCEDURE — 6370000000 HC RX 637 (ALT 250 FOR IP): Performed by: INTERNAL MEDICINE

## 2022-03-21 PROCEDURE — 80053 COMPREHEN METABOLIC PANEL: CPT

## 2022-03-21 PROCEDURE — 94640 AIRWAY INHALATION TREATMENT: CPT

## 2022-03-21 PROCEDURE — 85027 COMPLETE CBC AUTOMATED: CPT

## 2022-03-21 PROCEDURE — 36415 COLL VENOUS BLD VENIPUNCTURE: CPT

## 2022-03-21 PROCEDURE — 83735 ASSAY OF MAGNESIUM: CPT

## 2022-03-21 RX ORDER — TORSEMIDE 20 MG/1
20 TABLET ORAL DAILY
Qty: 30 TABLET | Refills: 3 | Status: SHIPPED | OUTPATIENT
Start: 2022-03-22 | End: 2022-06-22 | Stop reason: SDUPTHER

## 2022-03-21 RX ORDER — AMIODARONE HYDROCHLORIDE 400 MG/1
400 TABLET ORAL 2 TIMES DAILY
Qty: 17 TABLET | Refills: 0 | Status: SHIPPED
Start: 2022-03-21 | End: 2022-03-25 | Stop reason: DRUGHIGH

## 2022-03-21 RX ORDER — AMIODARONE HYDROCHLORIDE 200 MG/1
200 TABLET ORAL DAILY
Qty: 30 TABLET | Refills: 2 | Status: SHIPPED | OUTPATIENT
Start: 2022-03-30 | End: 2022-05-04 | Stop reason: SDUPTHER

## 2022-03-21 RX ORDER — METOPROLOL SUCCINATE 50 MG/1
50 TABLET, EXTENDED RELEASE ORAL 2 TIMES DAILY
Qty: 30 TABLET | Refills: 3 | Status: SHIPPED | OUTPATIENT
Start: 2022-03-21 | End: 2022-06-01

## 2022-03-21 RX ORDER — LISINOPRIL 5 MG/1
5 TABLET ORAL DAILY
Qty: 30 TABLET | Refills: 3 | Status: SHIPPED
Start: 2022-03-22 | End: 2022-04-14 | Stop reason: HOSPADM

## 2022-03-21 RX ORDER — PREDNISONE 20 MG/1
20 TABLET ORAL DAILY
Qty: 3 TABLET | Refills: 0 | Status: SHIPPED | OUTPATIENT
Start: 2022-03-21 | End: 2022-03-24

## 2022-03-21 RX ORDER — AMOXICILLIN 500 MG/1
500 CAPSULE ORAL 3 TIMES DAILY
Qty: 21 CAPSULE | Refills: 0 | Status: SHIPPED | OUTPATIENT
Start: 2022-03-21 | End: 2022-03-28

## 2022-03-21 RX ORDER — POTASSIUM CHLORIDE 20 MEQ/1
20 TABLET, EXTENDED RELEASE ORAL DAILY
Qty: 15 TABLET | Refills: 0 | Status: SHIPPED | OUTPATIENT
Start: 2022-03-21 | End: 2022-03-25

## 2022-03-21 RX ORDER — LANOLIN ALCOHOL/MO/W.PET/CERES
400 CREAM (GRAM) TOPICAL 2 TIMES DAILY
Qty: 30 TABLET | Refills: 0 | Status: SHIPPED | OUTPATIENT
Start: 2022-03-21 | End: 2022-03-25

## 2022-03-21 RX ADMIN — EZETIMIBE 10 MG: 10 TABLET ORAL at 08:50

## 2022-03-21 RX ADMIN — APIXABAN 5 MG: 5 TABLET, FILM COATED ORAL at 08:50

## 2022-03-21 RX ADMIN — METOPROLOL SUCCINATE 50 MG: 50 TABLET, EXTENDED RELEASE ORAL at 08:50

## 2022-03-21 RX ADMIN — PREDNISONE 20 MG: 20 TABLET ORAL at 08:50

## 2022-03-21 RX ADMIN — BUDESONIDE AND FORMOTEROL FUMARATE DIHYDRATE 2 PUFF: 160; 4.5 AEROSOL RESPIRATORY (INHALATION) at 08:36

## 2022-03-21 RX ADMIN — Medication 400 MG: at 08:50

## 2022-03-21 RX ADMIN — POTASSIUM CHLORIDE 20 MEQ: 20 TABLET, EXTENDED RELEASE ORAL at 08:50

## 2022-03-21 RX ADMIN — LISINOPRIL 5 MG: 5 TABLET ORAL at 08:50

## 2022-03-21 RX ADMIN — AMIODARONE HYDROCHLORIDE 400 MG: 200 TABLET ORAL at 08:50

## 2022-03-21 RX ADMIN — PANTOPRAZOLE SODIUM 40 MG: 40 TABLET, DELAYED RELEASE ORAL at 06:28

## 2022-03-21 RX ADMIN — TORSEMIDE 20 MG: 20 TABLET ORAL at 08:50

## 2022-03-21 ASSESSMENT — PAIN SCALES - GENERAL
PAINLEVEL_OUTOF10: 0
PAINLEVEL_OUTOF10: 0

## 2022-03-21 NOTE — PATIENT INSTRUCTIONS
Scheduled for Cardioversion with Dr. Corbin Anderson. The morning of your procedure you will park in the hospital parking lot and report directly to the cath lab to check in. DATE: Thursday 4/14/22  ARRIVAL TIME: 8am  TIME: 9 am     Pre-Procedure Instructions   1. Do not eat or drink anything after midnight the night before your procedure. 2. No gum or mints the morning of procedure. 3. Hold Demadex/tosemide morning of procedure. 4. Hold all diabetic medications including, Metfomin or insulin prior to procedure. 5. If you take Lantus/Levemir (long acting) only take ½ your normal dose the evening before. 6. All other medications can be taken in the morning with sips of water. 7. Do not stop/hold your blood thinner/anticoagulation therapy-Eliquis prior to procedure. 8. Do not use any lotions, creams or perfume the morning of procedure. 9. Pre-procedure lab work will need to be completed 5-7 days prior to procedure. 10. Please have a responsible adult to drive you home after procedure. 11. It is recommended you do not drive for 24 hours after procedure and that someone stay with you for precautionary measures. 12. Cath lab will provide you with all post procedure instructions. If you have any questions regarding the procedure itself or medications please call [phone] and ask to speak with a nurse    You have also been schedule in follow up with your Electrophysiologist Dr. Mamie Alan on Friday May 13th at 1pm at our [facility] office. [address]. Resume Torsemide 20 mg tablet every other day    Continue other medications    Call office next week with an update on his breathing and swelling.  Monitor weight  [phone]

## 2022-03-21 NOTE — TELEPHONE ENCOUNTER
Patient seen by Dr. Isidra Flannery over the weekend for Atrial fibrillation with RVR, discharged this AM.    Patient will need a repeat Echocardiogram in 3 months time. This can be ordered at the follow up visit. Please schedule for cardioversion with Dr. Kylee Dodd in 3 weeks, and a follow up appointment with Dr. Isidra Flannery in 6-8 weeks.

## 2022-03-21 NOTE — TELEPHONE ENCOUNTER
Scheduled for Cardioversion with Dr. Yosvany Ramirez. The morning of your procedure you will park in the hospital parking lot and report directly to the cath lab to check in. DATE: Thursday 4/14/22  ARRIVAL TIME: 8am  TIME: 9 am     Pre-Procedure Instructions   1. Do not eat or drink anything after midnight the night before your procedure. 2. No gum or mints the morning of procedure. 3. Hold Demadex/tosemide morning of procedure. 4. Hold all diabetic medications including, Metformin or insulin prior to procedure. 5. If you take Lantus/Levemir (long acting) only take ½ your normal dose the evening before. 6. All other medications can be taken in the morning with sips of water. 7. Do not stop/hold your blood thinner/anticoagulation therapy-Eliquis prior to procedure. 8. Do not use any lotions, creams or perfume the morning of procedure. 9. Pre-procedure lab work will need to be completed 5-7 days prior to procedure. 10. Please have a responsible adult to drive you home after procedure. 11. It is recommended you do not drive for 24 hours after procedure and that someone stay with you for precautionary measures. 12. Cath lab will provide you with all post procedure instructions. If you have any questions regarding the procedure itself or medications please call [phone] and ask to speak with a nurse    You have also been schedule in follow up with your Electrophysiologist Dr. Caren Peace on Friday May 13th at 1pm at our [facility] office. [address]. Case published to [employer] and form emailed to Heron Pimentel in cath lab, Queen Hero RN. Orders placed. Placed instructions on Shellie, [address] [facility] office visit planned for 3/25/22-instructions/education and note on appt note.

## 2022-03-21 NOTE — PLAN OF CARE
Fall risk assessment completed every shift. All precautions in place. Pt has call light within reach at all times. Room clear of clutter. Pt aware to call for assistance when getting up. Skin assessment completed every shift. Pt assessed for incontinence, appropriate barrier cream applied prn. Pt encouraged to turn/rotate every 2 hours. Assistance provided if pt unable to do so themselves. Pain/discomfort being managed with PRN analgesics per MD orders. Pt able to express presence and absence of pain and rate pain appropriately using numerical scale. Intake/Output Summary (Last 24 hours) at 3/21/2022 0742  Last data filed at 3/21/2022 0560  Gross per 24 hour   Intake 1980 ml   Output 2300 ml   Net -320 ml     Vitals:    03/21/22 0700   BP: (!) 133/90   Pulse:    Resp:    Temp: 97.7 °F (36.5 °C)   SpO2: 93%     Patient assessed for fall risk; fall precautions initiated. Patient and family instructed about safety devices. Environment kept free of clutter and adequate lighting provided. Bed locked and in lowest position. Call light within reach. Will continue to monitor.

## 2022-03-22 ENCOUNTER — CARE COORDINATION (OUTPATIENT)
Dept: CASE MANAGEMENT | Age: 67
End: 2022-03-22

## 2022-03-22 NOTE — CARE COORDINATION
[facility] Transitions Initial Follow Up Call    Call within 2 business days of discharge: Yes    Patient: Luis Has Patient : 1955   MRN: [id number]  Reason for Admission: afib, CHF, COPD exacerbation  Discharge Date: 3/21/22 RARS: Readmission Risk Score: 10.6 ( )      Last Discharge [facility]       Complaint Diagnosis Description Type Department Provider    3/15/22 Cough Atrial fibrillation, unspecified type ([facility] [address] . .. ED to Hosp-Admission (Discharged) (ADMITTED) MIROSLAVA WhiteN Ameena Bonilla MD; Donis Arellano. .. Spoke with: [address] [facility]: [facility]    Non-face-to-face services provided:  Obtained and reviewed discharge summary and/or continuity of care documents    Transitions of Care Initial Call    Was this an external facility discharge? No Discharge Facility: NA    Challenges to be reviewed by the provider   Additional needs identified to be addressed with provider: No  none             Method of communication with provider : phone      Advance Care Planning:   Does patient have an Advance Directive: decision maker updated. Advance Care Planning   Healthcare Decision Maker:    Primary Decision Maker: Jimmy Mar - [phone]    Secondary Decision Maker: Yang Guzman [facility] [phone]    Was this a readmission? No  Patient stated reason for admission: SOB, BLE edema  Patients top risk factors for readmission: medical condition-afib, CHF, COPD exacerbation    Care Transition Nurse (CTN) contacted the patient by telephone to perform post hospital discharge assessment. Verified name and  with patient as identifiers. Provided introduction to self, and explanation of the CTN role. CTN reviewed discharge instructions, medical action plan and red flags with patient who verbalized understanding. Patient given an opportunity to ask questions and does not have any further questions or concerns at this time.  Were discharge instructions available to patient? Yes. Reviewed appropriate site of care based on symptoms and resources available to patient including: PCP  Specialist. The patient agrees to contact the PCP office for questions related to their healthcare. Medication reconciliation was performed with patient, who verbalizes understanding of administration of home medications. Advised obtaining a 90-day supply of all daily and as-needed medications. Covid Risk Education     Educated patient about risk for severe COVID-19 due to risk factors according to CDC guidelines. LPN CC reviewed discharge instructions, medical action plan and red flag symptoms with the patient who verbalized understanding. Discussed COVID vaccination status: Yes. Education provided on COVID-19 vaccination as appropriate. Discussed exposure protocols and quarantine with CDC Guidelines. Patient was given an opportunity to verbalize any questions and concerns and agrees to contact LPN CC or health care provider for questions related to their healthcare. Reviewed and educated patient on any new and changed medications related to discharge diagnosis. Was patient discharged with a pulse oximeter? No Discussed and confirmed pulse oximeter discharge instructions and when to notify provider or seek emergency care. Patient verified  and was pleasant and agreeable to transition calls. States he is better. Denies CP, SOB, palpitations, cough. Edema greatly reduced. Weight 183 this AM on home scale. LPN CC reviewed 3/5 rule with patient. Appetite good. States he is following low sodium diet with fluid restrictions, states he is drinking around 1500 ml fluid daily. Declined RD referral. States everyone in the house is making changes as his son is a new diabetic. LPN CC praised patient and his family for their joint efforts. Denies problems with bowels or bladder. Wearing HENRIETTA hose as isntrcuted. Full medication reconciliation and 1111f completed. States he isn't going to start Torsemide until speaking with PCP. Patient scheduled for HFU and cardiology visit. Denies any acute needs at present time. Agreeable to f/u calls. Educated on the use of urgent care or physicians 24 hr access line if assistance is needed after hours. LPN CC provided contact information. Plan for follow-up call in 5-7 days based on severity of symptoms and risk factors.   Mayra Farrell  [facility]  [phone]    Care Transitions 24 Hour Call    Do you have any ongoing symptoms?: No  Do you have a copy of your discharge instructions?: Yes  Do you have all of your prescriptions and are they filled?: Yes  Have you been contacted by a [address]?: No  Have you scheduled your follow up appointment?: Yes  How are you going to get to your appointment?: Car - family or friend to transport  Were you discharged with any Home Care or Post Acute Services: No  Do you feel like you have everything you need to keep you well at home?: Yes  Care Transitions Interventions         Follow Up  Future Appointments   Date Time Provider Bernadine Acosta   3/25/2022  9:20 AM GUICHO Fletcher - CNP [facility]   5/13/2022  1:00 PM MD VELASQUEZ Castellanos [address], LPN

## 2022-03-23 NOTE — PROGRESS NOTES
[facility]  Office Visit    Tashi Quezada  1955 March 25, 2022    CC:   Chief Complaint   Patient presents with    Follow-up     sob on exertion      HPI:  The patient is 73 y.o. male with a past medical history significant for COPD, HTN, HLP and PAF who is here for hospital follow up. He was hospitalized at [facility] from 3/15/2022-3/21/2022 with several day H/O increasing dyspnea with minimal exertion, worsening bilateral leg swelling. He was noted to be in atrial fib with RVR and required IV cardizem for rate control. He was treated with IV lasix and decreased LVEF noted on echo. It was felt he had new onset tachycardia induced cardiomyopathy. Plan is to schedule DCCV with Dr. Angel Diana in 3 weeks and Dr. Kinsey Cabrera in 6 weeks. Overall feeling okay. Home weight has been stable 193-195#. Edema in R ankle/foot. SOBOE chronically: inhaler with some improvement. Has not been taking diuretic. Chronic nonproductive cough (normal cough). Sitting on side of the bed at night d/t difficulty breathing.  + PND. Denies chest pain/discomfort,  palpitations, dizziness, syncope or claudication. Monitoring sodium and fluid intake. + snorer, daytime somnolence. Continues to smoke. K+ 6.2 yesterday and received Kaexylate. Review of Systems:  Constitutional: Denies weakness, night sweats or fever. + chronic fatigue  HEENT: Denies new visual changes, ringing in ears, nosebleeds,nasal congestion  Respiratory: + SOB, PND   Cardiovascular: see HPI  GI: Denies N/V, diarrhea, constipation, abdominal pain, change in bowel habits, melena or hematochezia  : Denies urinary frequency, urgency, incontinence, hematuria or dysuria. Skin: Denies rash, hives, or cyanosis  Musculoskeletal: Denies joint or muscle aches/pain  Neurological: Denies syncope or TIA-like symptoms.   Psychiatric: Denies anxiety, insomnia or depression     Past Medical History:   Diagnosis Date    Chronic pain syndrome     Chronic pain syndrome     COPD (chronic obstructive pulmonary disease) ([facility] [address] 6/5/2012    DDD (degenerative disc disease), lumbar     Depression 6/5/2012    Diverticulitis 6/5/2012    DJD (degenerative joint disease) of knee     Foraminal stenosis of lumbar region     LORIE (generalized anxiety disorder) 6/5/2012    GERD (gastroesophageal reflux disease) 6/5/2012    HTN (hypertension) 6/5/2012    Incidental lung nodule     Insomnia     Lumbar radiculitis     Neuropathy 6/5/2012    Nontraumatic rupture of tendons of biceps (long head) 6/5/2012    Numbness and tingling of both legs     Pernicious anemia 6/5/2012    Sciatica     Sprain and strain of unspecified site of elbow and forearm 6/5/2012     Past Surgical History:   Procedure Laterality Date    MUSCLE REPAIR Bilateral 8-10 years ago    Pulled bilateral biceps off bone from lifting weights.      Family History   Problem Relation Age of Onset    Arthritis Father     Other Father         GERD    Heart Disease Father         had heart attack     Social History     Tobacco Use    Smoking status: Current Every Day Smoker     Packs/day: 1.50     Years: 50.00     Pack years: 75.00     Types: Cigarettes    Smokeless tobacco: Never Used    Tobacco comment: pt smokes 1 pk and a half a day    Vaping Use    Vaping Use: Never used   Substance Use Topics    Alcohol use: No     Comment: no longer drinks    Drug use: Yes     Types: Marijuana (Weed)     Comment: daily use, \"like a cocktail\"       Allergies   Allergen Reactions    Atorvastatin-Coenzyme Q10 Diarrhea    Lodine [Etodolac]     Neurontin [Gabapentin]      edema    Pregabalin Swelling    Vioxx Diarrhea    Wellbutrin [Bupropion Hcl]      Current Outpatient Medications   Medication Sig Dispense Refill    sodium polystyrene (KAYEXALATE) 15 GM/60ML suspension Take 60 mLs by mouth once for 1 dose 240 mL 3    [START ON 3/30/2022] amiodarone (CORDARONE) 200 MG tablet Take 1 tablet by mouth daily (Patient taking differently: Take 200 mg by mouth Taking 400 mg twice a day and decrease to 200 mg twice a day beginning 4/2/2022) 30 tablet 2    lisinopril (PRINIVIL;ZESTRIL) 5 MG tablet Take 1 tablet by mouth daily 30 tablet 3    metoprolol succinate (TOPROL XL) 50 MG extended release tablet Take 1 tablet by mouth 2 times daily 30 tablet 3    amoxicillin (AMOXIL) 500 MG capsule Take 1 capsule by mouth 3 times daily for 7 days 21 capsule 0    apixaban (ELIQUIS) 5 MG TABS tablet Take 1 tablet by mouth 2 times daily 60 tablet 5    ezetimibe (ZETIA) 10 MG tablet Take 1 tablet by mouth daily 30 tablet 6    omeprazole (PRILOSEC) 40 MG delayed release capsule 1 tab po daily 90 capsule 3    albuterol sulfate HFA (VENTOLIN HFA) 108 (90 Base) MCG/ACT inhaler Inhale 2 puffs into the lungs 4 times daily as needed for Wheezing 1 each 2    budesonide-formoterol (SYMBICORT) 160-4.5 MCG/ACT AERO Inhale 2 puffs into the lungs 2 times daily 3 Inhaler 5    torsemide (DEMADEX) 20 MG tablet Take 1 tablet by mouth daily (Patient not taking: Reported on 3/25/2022) 30 tablet 3     No current facility-administered medications for this visit. Physical Exam:   BP (!) 144/88   Pulse 89   Ht 5' 8\" (1.727 m)   Wt 202 lb 6.4 oz (91.8 kg)   SpO2 96%   BMI 30.77 kg/m²   Wt Readings from Last 2 Encounters:   03/25/22 202 lb 6.4 oz (91.8 kg)   03/24/22 200 lb (90.7 kg)     Constitutional: He is oriented to person, place, and time. He appears well-developed and well-nourished. In no acute distress. HEENT: Normocephalic and atraumatic. Sclerae anicteric. Xanthomas noted above both eyes  Neck: Supple. No JVD present. Carotids without bruits. No thyromegaly present. Cardiovascular: irreg, irreg, normal S1 and S2; soft systolic murmur  Pulmonary/Chest: Effort normal.  Lungs with diffuse wheezes. Chest wall nontender  Abdominal: soft, nontender, nondistended. + bowel sounds; no organomegaly   Extremities: No cyanosis.  1+ bilateral ankle edema. Pulses are 2+ radial/carotidDP/PT bilaterally. Cap refill brisk. Neurological: No focal deficit. Skin: Skin is warm and dry. Psychiatric: He has a normal mood and affect. His speech is normal and behavior is normal.     Lab Review:   Lab Results   Component Value Date    TRIG 249 08/25/2021    HDL 44 08/25/2021    LDLCALC 193 08/25/2021    LABVLDL 48 08/25/2021     Lab Results   Component Value Date     03/24/2022    K 6.2 03/24/2022    K 2.8 03/15/2022     03/24/2022    CO2 20 03/24/2022    BUN 35 03/24/2022    CREATININE 1.44 03/24/2022    GLUCOSE 134 03/24/2022    CALCIUM 9.7 03/24/2022      Lab Results   Component Value Date    WBC 13.1 (H) 03/24/2022    HGB 16.0 03/24/2022    HCT 48.5 03/24/2022    MCV 99 (H) 03/24/2022     03/24/2022     3/25/2022ECG:  Atrial fib with VR 83    3/18/2022 DIANA/DCCV  1. Successful transesophageal echocardiography with no evidence of  thrombus in left atrium, left atrial appendage or left ventricle. 2.  Unsuccessful attempt x3 at synchronized cardioversion from atrial  fibrillation with rapid ventricular response to a normal sinus rhythm. We will load the patient with 300 mg additional IV amiodarone. 3/18/2022 DIANA:  Normal left ventricular size and wall thickness with severely reduced function. Ejection fraction is visually estimated to be <20%. Regional wall motion abnormalities are noted. Ascending aorta has trivial plaque noted. Normal right ventricular size and function. Left atrial appendage, left atrium and left ventricle visualized with no evidence of thrombus. Ascending aorta has mild atherosclerotic plaque noted. There are no significant valvular abnormalities appreciated ion this study. 3/17/2022 Echo:   Left ventricular cavity size is normal.   Normal left ventricular wall thickness. Ejection fraction is severely visually estimated to be 20%. Regional wall motion abnormalities are noted.    Indeterminate diastolic function. The left atrium is moderately dilated. Normal right ventricular size. Right ventricular systolic function is moderately-severely reduced. Mild tricuspid regurgitation. [place] Lever estimated pulmonary artery systolic pressure is normal at 32 mmHg assuming a   right atrial pressure of 8 mmHg. 2/9/2018 Echo:   Left ventricular size is mildly increased. Normal left ventricular wall thickness. Left ventricular function is normal with ejection fraction estimated at   55-60 %. There is reversal of E/A inflow velocities across the mitral valve. Mild thickening of anterior leaflet of mitral valve. Trivial mitral regurgitation is present. Trivial tricuspid regurgitation. Trivial pulmonic regurgitation present. 2/9/2018 Stress Myoview:   Baseline ECG makes stress test difficult to interpret but no clear ischemic   ECG changes were seen   frequent PVC developing during the exercise phase   Poor exercise capacity    2/14/2015 LHC:     LEFT VENTRICULOGRAM:  Reveals a normal left ventricular systolic function  with an estimated EF of 50% to 55%. OVERALL IMPRESSION:    1. Patent, nondominant right coronary artery. 2.  Patent left main trunk. 3.  Patent left anterior descending artery and its branches. 4.  Patent dominant circumflex artery and its branches. 5.  Normal left ventricular systolic function. 6.  Patent right femoral, common femoral artery. 2/12/2015 Echo   Normal LV size and systolic function. Estimated ejection fraction is 55-60%. There is trivial tricuspid regurgitation with RVSP estimated at 32 mmHg. Assessment:    1. Persistent atrial fibrillation (HCC)  -VR controlled  -continue Toprol  -continue Eliquis: plan for DCCV in few weeks (4/14/2022)  -continue Amiodarone    2.  [facility]  -LVEF < 20%  -suspect tachycardia induced and S/P failed DCCV x 3 attempts during recent hospital stay  -would recommend consideration of stress testing after his DCCV if restoration of SR and no improvement in LVEF  -continue Toprol, lisinopril,  Torsemide    3. Smoking addiction  -smoking cessation emphasized    4. Acute on chronic systolic HF (heart failure) (HCC)  -he has sxs of CHF and advised to start his diuretic (he has not been taking since discharge)  -resume Torsemide 20 mg every other day and monitor Cr and lytes  -NYHA class III; LVEF < 20%  -continue torsemide, lisinopril and Toprol  -will hold Aldactone and SGLT2 inhibitor given increased Cr and elevated K+  -low sodium diet and fluid restriction  -reevaluate LVEF in 90 days: if LVEF < 35% consider ICD therapy (will need ischemic eval prior)    5. Primary hypertension  -goal BP < 130/80  -continues to be elevated at times  -continue medical management    6. Hyperkalemia  -K+ 6.2 yesterday  -reevaluate after holding K+ supplement    7. Chronic kidney disease, stage IIIa ([facility] [address]  -continue to monitor Cr    Plan:  Discussed DCCV with Conor Lopez and his wife. Reviewed procedure, indications and risks and agreeable to proceed. Will arrange with Dr. Pichardo Said  Add torsemide 20 mg every other day (monitor Cr)  Continue Toprol, lisinopril, zetia, Eliquis and amiodarone  Emphasized and discussed low-fat/low sodium diet, monitoring of daily weights, fluid restriction, worsening signs and symptoms of heart failure and when to call, and the importance of regular exercise and activity. Emphasized and discussed strategies for smoking cessation (> 3-5 minutes of counseling given)  Check BMP in 5 days  Follow up with D. Enzweiler, CNP post DCCV    Return for Post procedure with D. Enzweiler, CNP. Thanks for allowing me to participate in the care of this patient.       MINDI Garcia  [address], [address] [facility] [address] [place] [address], [address] [place] [address]  Office: [phone]  Fax: [phone]      Electronically signed by GUICHO Tony CNP on 3/25/2022 at 10:39 AM

## 2022-03-25 ENCOUNTER — TELEPHONE (OUTPATIENT)
Dept: CARDIOLOGY | Age: 67
End: 2022-03-25

## 2022-03-25 ENCOUNTER — OFFICE VISIT (OUTPATIENT)
Dept: CARDIOLOGY CLINIC | Age: 67
End: 2022-03-25
Payer: COMMERCIAL

## 2022-03-25 VITALS
HEART RATE: 89 BPM | DIASTOLIC BLOOD PRESSURE: 88 MMHG | WEIGHT: 202.4 LBS | HEIGHT: 68 IN | SYSTOLIC BLOOD PRESSURE: 144 MMHG | OXYGEN SATURATION: 96 % | BODY MASS INDEX: 30.68 KG/M2

## 2022-03-25 DIAGNOSIS — I10 PRIMARY HYPERTENSION: ICD-10-CM

## 2022-03-25 DIAGNOSIS — F17.200 SMOKING ADDICTION: ICD-10-CM

## 2022-03-25 DIAGNOSIS — I42.8 OTHER CARDIOMYOPATHY (HCC): ICD-10-CM

## 2022-03-25 DIAGNOSIS — I48.19 PERSISTENT ATRIAL FIBRILLATION (HCC): Primary | ICD-10-CM

## 2022-03-25 DIAGNOSIS — I50.43 ACUTE ON CHRONIC COMBINED SYSTOLIC AND DIASTOLIC CONGESTIVE HEART FAILURE (HCC): Primary | ICD-10-CM

## 2022-03-25 DIAGNOSIS — N18.31 STAGE 3A CHRONIC KIDNEY DISEASE (HCC): ICD-10-CM

## 2022-03-25 DIAGNOSIS — I48.91 ATRIAL FIBRILLATION, UNSPECIFIED TYPE (HCC): ICD-10-CM

## 2022-03-25 DIAGNOSIS — I50.23 ACUTE ON CHRONIC SYSTOLIC HF (HEART FAILURE) (HCC): ICD-10-CM

## 2022-03-25 DIAGNOSIS — E87.5 HYPERPOTASSEMIA: ICD-10-CM

## 2022-03-25 LAB
A/G RATIO: 1.3 (ref 1.1–2.2)
ALBUMIN SERPL-MCNC: 3.7 G/DL (ref 3.4–5)
ALP BLD-CCNC: 81 U/L (ref 40–129)
ALT SERPL-CCNC: 42 U/L (ref 10–40)
ANION GAP SERPL CALCULATED.3IONS-SCNC: 11 MMOL/L (ref 3–16)
ANISOCYTOSIS: ABNORMAL
AST SERPL-CCNC: 24 U/L (ref 15–37)
BANDED NEUTROPHILS RELATIVE PERCENT: 2 % (ref 0–7)
BASOPHILS ABSOLUTE: 0 K/UL (ref 0–0.2)
BASOPHILS RELATIVE PERCENT: 0 %
BILIRUB SERPL-MCNC: <0.2 MG/DL (ref 0–1)
BUN BLDV-MCNC: 35 MG/DL (ref 7–20)
CALCIUM SERPL-MCNC: 9.4 MG/DL (ref 8.3–10.6)
CHLORIDE BLD-SCNC: 100 MMOL/L (ref 99–110)
CO2: 26 MMOL/L (ref 21–32)
CREAT SERPL-MCNC: 1.5 MG/DL (ref 0.8–1.3)
EOSINOPHILS ABSOLUTE: 0.6 K/UL (ref 0–0.6)
EOSINOPHILS RELATIVE PERCENT: 4 %
GFR AFRICAN AMERICAN: 57
GFR NON-AFRICAN AMERICAN: 47
GLUCOSE BLD-MCNC: 95 MG/DL (ref 70–99)
HCT VFR BLD CALC: 49.4 % (ref 40.5–52.5)
HEMOGLOBIN: 16.2 G/DL (ref 13.5–17.5)
LYMPHOCYTES ABSOLUTE: 1.8 K/UL (ref 1–5.1)
LYMPHOCYTES RELATIVE PERCENT: 12 %
MCH RBC QN AUTO: 32.7 PG (ref 26–34)
MCHC RBC AUTO-ENTMCNC: 32.8 G/DL (ref 31–36)
MCV RBC AUTO: 99.8 FL (ref 80–100)
MONOCYTES ABSOLUTE: 0.6 K/UL (ref 0–1.3)
MONOCYTES RELATIVE PERCENT: 4 %
NEUTROPHILS ABSOLUTE: 12 K/UL (ref 1.7–7.7)
NEUTROPHILS RELATIVE PERCENT: 78 %
PDW BLD-RTO: 15.3 % (ref 12.4–15.4)
PLATELET # BLD: 329 K/UL (ref 135–450)
PLATELET SLIDE REVIEW: ADEQUATE
PMV BLD AUTO: 9.6 FL (ref 5–10.5)
POTASSIUM SERPL-SCNC: 5.2 MMOL/L (ref 3.5–5.1)
RBC # BLD: 4.95 M/UL (ref 4.2–5.9)
SLIDE REVIEW: ABNORMAL
SODIUM BLD-SCNC: 137 MMOL/L (ref 136–145)
TOTAL PROTEIN: 6.6 G/DL (ref 6.4–8.2)
WBC # BLD: 15 K/UL (ref 4–11)

## 2022-03-25 PROCEDURE — 1123F ACP DISCUSS/DSCN MKR DOCD: CPT | Performed by: NURSE PRACTITIONER

## 2022-03-25 PROCEDURE — 3017F COLORECTAL CA SCREEN DOC REV: CPT | Performed by: NURSE PRACTITIONER

## 2022-03-25 PROCEDURE — 1111F DSCHRG MED/CURRENT MED MERGE: CPT | Performed by: NURSE PRACTITIONER

## 2022-03-25 PROCEDURE — 93000 ELECTROCARDIOGRAM COMPLETE: CPT | Performed by: NURSE PRACTITIONER

## 2022-03-25 PROCEDURE — G8417 CALC BMI ABV UP PARAM F/U: HCPCS | Performed by: NURSE PRACTITIONER

## 2022-03-25 PROCEDURE — 4004F PT TOBACCO SCREEN RCVD TLK: CPT | Performed by: NURSE PRACTITIONER

## 2022-03-25 PROCEDURE — G8427 DOCREV CUR MEDS BY ELIG CLIN: HCPCS | Performed by: NURSE PRACTITIONER

## 2022-03-25 PROCEDURE — 99214 OFFICE O/P EST MOD 30 MIN: CPT | Performed by: NURSE PRACTITIONER

## 2022-03-25 PROCEDURE — G8484 FLU IMMUNIZE NO ADMIN: HCPCS | Performed by: NURSE PRACTITIONER

## 2022-03-25 PROCEDURE — 4040F PNEUMOC VAC/ADMIN/RCVD: CPT | Performed by: NURSE PRACTITIONER

## 2022-03-25 NOTE — TELEPHONE ENCOUNTER
Reviewed labs from today:  Cr 1.5  K+ 5.2 and improved    Please call Mr. Arnold Bryant and inform him he should take the torsemide 20 mg every other day as outlined at OV this AM and have repeat BMP in 5 days (nonfasting lab).     Lab order has been placed

## 2022-03-29 ENCOUNTER — CARE COORDINATION (OUTPATIENT)
Dept: CASE MANAGEMENT | Age: 67
End: 2022-03-29

## 2022-03-29 NOTE — CARE COORDINATION
[facility] Transitions Follow Up Call    3/29/2022    Patient: Adrian Morgan  Patient : 1955   MRN: [id number]  Reason for Admission: a fib  Discharge Date: 3/21/22 RARS: Readmission Risk Score: 10.6 ( )         Spoke with: [facility] Transitions Subsequent and Final Call    Subsequent and Final Calls  Do you have any ongoing symptoms?: No  Have your medications changed?: No  Do you have any questions related to your medications?: No  Do you currently have any active services?: No  Do you have any needs or concerns that I can assist you with?: No  Care Transitions Interventions  Other Interventions:         States he's doing alright. Denies CP, palpitations, SOB, lightheadedness or dizziness. States the swelling in his feet went down a lot, but they're still a little puffy. Advised to elevate them when sitting. Pt verbalized understanding. Reports taking medications as prescribed and denies questions or concerns at this time.     Follow Up  Future Appointments   Date Time Provider Bernadine Acosta   2022  9:00 AM [facility] PRE POST 1 [facility]   2022  1:00 PM Sharyle Helling, MD [facility]   2022  9:30 AM Verdia Phalen, MD [facility]

## 2022-04-05 ENCOUNTER — CARE COORDINATION (OUTPATIENT)
Dept: CASE MANAGEMENT | Age: 67
End: 2022-04-05

## 2022-04-05 NOTE — CARE COORDINATION
Care Transitions Follow Up Call    Needs to be reviewed by the provider   Additional needs identified to be addressed with provider: No               Method of communication with provider : none      Care Transition Nurse (CTN) contacted the patient by telephone to follow up after admission on 03/15/2022. Verified name and  with patient as identifiers. CTN provided contact information for future needs. Plan for next call: weight - SOB - edema     Alessio Lucero states he is doing \"alright\". He states he received the COVID vaccine x2 doses and the additional booster. Alessio Lucero denies any SOB. He states his edema is decreasing. He states his weight this morning = 189.4lbs and his weight last evening was 190.6lbs. He states he has not worn the support stockings for 2 days. He states he is keeping his feet & legs elevated as much as possible. Alessio Lucero denies any needs at this time.     Jose Wyatt RN BSN  Care Transition Nurse  [phone]

## 2022-04-11 DIAGNOSIS — I48.91 ATRIAL FIBRILLATION WITH RVR (HCC): ICD-10-CM

## 2022-04-11 LAB
ANION GAP SERPL CALCULATED.3IONS-SCNC: 16 MMOL/L (ref 3–16)
BUN BLDV-MCNC: 43 MG/DL (ref 7–20)
CALCIUM SERPL-MCNC: 10 MG/DL (ref 8.3–10.6)
CHLORIDE BLD-SCNC: 100 MMOL/L (ref 99–110)
CO2: 22 MMOL/L (ref 21–32)
CREAT SERPL-MCNC: 2.2 MG/DL (ref 0.8–1.3)
GFR AFRICAN AMERICAN: 36
GFR NON-AFRICAN AMERICAN: 30
GLUCOSE BLD-MCNC: 94 MG/DL (ref 70–99)
HCT VFR BLD CALC: 50.1 % (ref 40.5–52.5)
HEMOGLOBIN: 16.8 G/DL (ref 13.5–17.5)
MCH RBC QN AUTO: 33.1 PG (ref 26–34)
MCHC RBC AUTO-ENTMCNC: 33.6 G/DL (ref 31–36)
MCV RBC AUTO: 98.7 FL (ref 80–100)
PDW BLD-RTO: 15.1 % (ref 12.4–15.4)
PLATELET # BLD: 305 K/UL (ref 135–450)
PMV BLD AUTO: 9.7 FL (ref 5–10.5)
POTASSIUM SERPL-SCNC: 5.4 MMOL/L (ref 3.5–5.1)
RBC # BLD: 5.08 M/UL (ref 4.2–5.9)
SODIUM BLD-SCNC: 138 MMOL/L (ref 136–145)
WBC # BLD: 7.3 K/UL (ref 4–11)

## 2022-04-12 NOTE — DISCHARGE SUMMARY
Hospitalist Discharge Summary   4/12/2022 2:50 PM  Subjective:   Admit Date: 3/15/2022  PCP: Elena Heath MD  Interval History: pt is ready for the discharge  Feels better  rxed for afib with RVR  Also for copd exacerbation  Rest of the events as in progress notes and chart  Denies any other complaints  Chart reviewed. Diet: No diet orders on file  Medications:   Scheduled Meds:  Continuous Infusions:  CBC: Recent Labs     04/11/22  1239   WBC 7.3   HGB 16.8        BMP:    Recent Labs     04/11/22  1239      K 5.4*      CO2 22   BUN 43*   CREATININE 2.2*   GLUCOSE 94     Hepatic: No results for input(s): AST, ALT, ALB, BILITOT, ALKPHOS in the last 72 hours. Troponin: No results for input(s): TROPONINI in the last 72 hours. BNP: No results for input(s): BNP in the last 72 hours. Lipids: No results for input(s): CHOL, HDL in the last 72 hours. Invalid input(s): LDLCALCU  INR: No results for input(s): INR in the last 72 hours. Orders Placed This Encounter   Procedures    COVID-19, Rapid     Standing Status:   Standing     Number of Occurrences:   1     Order Specific Question:   Is this test for diagnosis or screening? Answer:   Screening     Order Specific Question:   Symptomatic for COVID-19 as defined by CDC? Answer:   No     Order Specific Question:   Reason for Test     Answer:   Upcoming elective surgery/procedure/delivery, return to work, or discharge to another facility     Order Specific Question:   Date of Symptom Onset     Answer:   N/A     Order Specific Question:   Hospitalized for COVID-19? Answer:   No     Order Specific Question:   Admitted to ICU for COVID-19? 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 Answer:   Unknown     Order Specific Question:   Pregnant:      Answer:   No    XR CHEST (2 VW)     Standing Status:   Standing     Number of Occurrences:   1     Order Specific Question:   Reason for exam:     Answer:   SOB - progressively worsening for 2 weeks.  Occassional Non-productive cough    CBC with Auto Differential     Standing Status:   Standing     Number of Occurrences:   1    Basic Metabolic Panel w/ Reflex to MG     Standing Status:   Standing     Number of Occurrences:   1    Troponin     Standing Status:   Standing     Number of Occurrences:   1    Brain Natriuretic Peptide     Standing Status:   Standing     Number of Occurrences:   1    Blood Gas, Venous     Standing Status:   Standing     Number of Occurrences:   1    Lactic Acid     Standing Status:   Standing     Number of Occurrences:   1    Magnesium     Standing Status:   Standing     Number of Occurrences:   1    CBC with Auto Differential     Standing Status:   Standing     Number of Occurrences:   1    Troponin     Standing Status:   Standing     Number of Occurrences:   1    Renal Function Panel     Standing Status:   Standing     Number of Occurrences:   1    Magnesium     Standing Status:   Standing     Number of Occurrences:   1    Potassium     Standing Status:   Standing     Number of Occurrences:   1    Comprehensive Metabolic Panel     Standing Status:   Standing     Number of Occurrences:   1    Magnesium     Standing Status:   Standing     Number of Occurrences:   1    Comprehensive Metabolic Panel     Standing Status:   Standing     Number of Occurrences:   1    Magnesium     Standing Status:   Standing     Number of Occurrences:   1    Comprehensive Metabolic Panel     Standing Status:   Standing     Number of Occurrences:   1    Magnesium     Standing Status:   Standing     Number of Occurrences:   1    CBC     Standing Status:   Standing     Number of Occurrences:   1    Basic Metabolic Panel     Standing Status:   Standing     Number of Occurrences:   1    Initiate PAT Protocol     Standing Status:   Future Standing Expiration Date:   5/16/2022   [place] Inpatient consult to Internal Medicine     Standing Status:   Standing     Number of Occurrences:   1     Order Specific Question:   Reason for Consult? Answer:   [facility] F[facility] patient    Inpatient consult to Cardiology     Standing Status:   Standing     Number of Occurrences:   1     Order Specific Question:   Reason for Consult? Answer:   afib rvr     Order Specific Question:   Provider Contacted? Answer:    Yes    POCT Glucose     Standing Status:   Standing     Number of Occurrences:   1    POCT Glucose     Standing Status:   Standing     Number of Occurrences:   1    POCT Glucose     Standing Status:   Standing     Number of Occurrences:   1    POCT Glucose     Standing Status:   Standing     Number of Occurrences:   1    POCT Glucose     Standing Status:   Standing     Number of Occurrences:   1    EKG 12 Lead     Standing Status:   Standing     Number of Occurrences:   1     Order Specific Question:   Reason for Exam?     Answer:   Shortness of Breath    EKG Rhythm Strip     Standing Status:   Standing     Number of Occurrences:   1    EKG Rhythm Strip     Standing Status:   Standing     Number of Occurrences:   1    EKG Rhythm Strip     Standing Status:   Standing     Number of Occurrences:   1    EKG Rhythm Strip     Standing Status:   Standing     Number of Occurrences:   1    EKG Rhythm Strip     Standing Status:   Standing     Number of Occurrences:   1    EKG Rhythm Strip     Standing Status:   Standing     Number of Occurrences:   1    EKG Rhythm Strip     Standing Status:   Standing     Number of Occurrences:   1    EKG Rhythm Strip     Standing Status:   Standing     Number of Occurrences:   1    EKG Rhythm Strip     Standing Status:   Standing     Number of Occurrences:   1    EKG Rhythm Strip     Standing Status:   Standing     Number of Occurrences:   1    EKG Rhythm Strip     Standing Status:   Standing     Number of Occurrences: 1    EKG Rhythm Strip     Standing Status:   Standing     Number of Occurrences:   1    ECHO Complete 2D W Doppler W Color     Standing Status:   Standing     Number of Occurrences:   1     Order Specific Question:   Reason for exam:     Answer:   afib    ECHO Transesophageal     Standing Status:   Standing     Number of Occurrences:   1     Order Specific Question:   Reason for exam:     Answer:   A-Fib    Insert peripheral IV     Standing Status:   Standing     Number of Occurrences:   1    ADMIT TO INPATIENT     Standing Status:   Standing     Number of Occurrences:   1     Order Specific Question:   Discharge Plan:     Answer:   Home with Office Follow-up    Transfer patient     Standing Status:   Standing     Number of Occurrences:   1    Discharge patient     Standing Status:   Standing     Number of Occurrences:   1     Order Specific Question:   Discharge Disposition     Answer:   Home       No current facility-administered medications for this encounter.      Current Outpatient Medications   Medication Sig Dispense Refill    amiodarone (CORDARONE) 200 MG tablet Take 1 tablet by mouth daily (Patient taking differently: Take 200 mg by mouth Taking 400 mg twice a day and decrease to 200 mg twice a day beginning 4/2/2022) 30 tablet 2    lisinopril (PRINIVIL;ZESTRIL) 5 MG tablet Take 1 tablet by mouth daily 30 tablet 3    metoprolol succinate (TOPROL XL) 50 MG extended release tablet Take 1 tablet by mouth 2 times daily 30 tablet 3    torsemide (DEMADEX) 20 MG tablet Take 1 tablet by mouth daily (Patient not taking: Reported on 3/25/2022) 30 tablet 3    apixaban (ELIQUIS) 5 MG TABS tablet Take 1 tablet by mouth 2 times daily 60 tablet 5    ezetimibe (ZETIA) 10 MG tablet Take 1 tablet by mouth daily 30 tablet 6    omeprazole (PRILOSEC) 40 MG delayed release capsule 1 tab po daily 90 capsule 3    albuterol sulfate HFA (VENTOLIN HFA) 108 (90 Base) MCG/ACT inhaler Inhale 2 puffs into the lungs 4 times daily as needed for Wheezing 1 each 2    budesonide-formoterol (SYMBICORT) 160-4.5 MCG/ACT AERO Inhale 2 puffs into the lungs 2 times daily 3 Inhaler 5    sodium polystyrene (KAYEXALATE) 15 GM/60ML suspension Take 60 mLs by mouth once for 1 dose 240 mL 3       Orders Placed This Encounter   Medications    DISCONTD: potassium chloride 10 mEq/100 mL IVPB (Peripheral Line)    DISCONTD: 0.9 % sodium chloride bolus    dilTIAZem injection 10 mg    DISCONTD: dilTIAZem 125 mg in dextrose 5 % 125 mL infusion     Order Specific Question:   Titrate Infusion? Answer:   Yes     Order Specific Question:   Initial Infusion Dose: Answer:   5 mg/hr     Order Specific Question:   Goal of Therapy is: Answer:   HR less than 100 bpm     Order Specific Question:   Contact Provider if:     Answer:   SBP less than 90 mmHg     Order Specific Question:   Contact Provider if:     Answer:   max dose does not achieve desired response     Order Specific Question:   HOLD for     Answer:   HR less than 60 bpm     Order Specific Question:   HOLD for     Answer:   SBP less than 90 mmHg    potassium chloride 10 mEq/100 mL IVPB (Peripheral Line)    DISCONTD: apixaban (ELIQUIS) tablet 5 mg    DISCONTD: atorvastatin (LIPITOR) tablet 20 mg    DISCONTD: aspirin chewable tablet 81 mg    DISCONTD: budesonide-formoterol (SYMBICORT) 160-4.5 MCG/ACT inhaler 2 puff    DISCONTD: ezetimibe (ZETIA) tablet 10 mg    DISCONTD: predniSONE (DELTASONE) tablet 40 mg    DISCONTD: furosemide (LASIX) injection 20 mg    DISCONTD: pantoprazole (PROTONIX) tablet 40 mg    ipratropium-albuterol (DUONEB) nebulizer solution 1 ampule     Order Specific Question:   Initiate RT Bronchodilator Protocol     Answer:    Yes    DISCONTD: magnesium sulfate injection 2,000 mg    DISCONTD: potassium chloride 10 mEq/100 mL IVPB (Peripheral Line)    magnesium sulfate 2000 mg in 50 mL IVPB premix    metoprolol (LOPRESSOR) injection 5 mg    DISCONTD: furosemide (LASIX) injection 40 mg    potassium chloride (KLOR-CON M) extended release tablet 40 mEq    DISCONTD: dilTIAZem (CARDIZEM CD) extended release capsule 240 mg    potassium chloride (KLOR-CON M) extended release tablet 20 mEq    potassium chloride (KLOR-CON M) extended release tablet 20 mEq    dilTIAZem (CARDIZEM CD) extended release capsule 120 mg    DISCONTD: dilTIAZem (CARDIZEM CD) extended release capsule 360 mg    FOLLOWED BY Linked Order Group     amiodarone (CORDARONE) 150 mg in dextrose 5 % 100 mL bolus     amiodarone (CORDARONE) 450 mg in dextrose 5 % 250 mL infusion    DISCONTD: amiodarone (CORDARONE) 450 mg in dextrose 5 % 250 mL infusion    digoxin (LANOXIN) injection 250 mcg    DISCONTD: furosemide (LASIX) injection 40 mg    DISCONTD: dilTIAZem (CARDIZEM CD) extended release capsule 240 mg    DISCONTD: hydroCHLOROthiazide (HYDRODIURIL) tablet 25 mg    amiodarone (CORDARONE) 300 mg in dextrose 5 % 100 mL bolus    DISCONTD: albuterol sulfate  (90 Base) MCG/ACT inhaler 2 puff     Order Specific Question:   Initiate RT Bronchodilator Protocol     Answer:    Yes    DISCONTD: predniSONE (DELTASONE) tablet 20 mg    DISCONTD: metoprolol succinate (TOPROL XL) extended release tablet 50 mg    DISCONTD: magnesium oxide (MAG-OX) tablet 400 mg    potassium chloride (KLOR-CON M) extended release tablet 40 mEq    DISCONTD: potassium chloride (KLOR-CON M) extended release tablet 20 mEq    DISCONTD: torsemide (DEMADEX) tablet 20 mg    DISCONTD: lisinopril (PRINIVIL;ZESTRIL) tablet 5 mg    DISCONTD: amiodarone (CORDARONE) tablet 400 mg    DISCONTD: amiodarone (CORDARONE) tablet 200 mg    DISCONTD: amiodarone (PACERONE) 400 MG tablet     Sig: Take 1 tablet by mouth 2 times daily for 17 doses     Dispense:  17 tablet     Refill:  0    amiodarone (CORDARONE) 200 MG tablet     Sig: Take 1 tablet by mouth daily     Dispense:  30 tablet     Refill:  2    lisinopril (PRINIVIL;ZESTRIL) 5 MG biceps (long head)     Sprain and strain of unspecified site of elbow and forearm     LORIE (generalized anxiety disorder)     Diverticulitis     Pernicious anemia     Chronic obstructive pulmonary disease (HCC)     Tobacco abuse     Chronic pain syndrome     Lung nodule     Sciatica     Numbness and tingling of both legs     Neuropathy, peroneal nerve     DDD (degenerative disc disease), lumbar     Lumbar radiculitis     Insomnia     DJD (degenerative joint disease) of knee     Foraminal stenosis of lumbar region     Back pain, chronic     Need for prophylactic vaccination and inoculation against influenza     Acute gouty arthropathy     Knee arthropathy     Edema     Cervical paraspinal muscle spasm     Pleurisy     HLD (hyperlipidemia)     COPD exacerbation (HCC)     Chest pain     Chest pain     Hypokalemia     Abnormal nuclear stress test     Uncontrolled hypertension     Atrial fibrillation ([address]     Erythrocytosis     Encounter for smoking cessation counseling     Paroxysmal atrial fibrillation with rapid ventricular response (HCC)     Atrial fibrillation with RVR ([address]     Cardiomegaly     Hypomagnesemia    Pt is stable. Discussed with staff and pt about the plan. See the orders for further plan. Will proceed further acc to pt's progress. Time spent with management of the pt is- 20 mts  Follow up in office in 1 wk. See the [address] [place] and Med rec forms  Follow up with specialists as instructed by them. Advised the pt to call me in case of questions or worsening of symptoms. Pt voiced understanding of the instructions.   Condition and prognosis is guarded      Electronically signed by: Franc Gipson MD, on 4/12/2022, at 2:50 PM

## 2022-04-13 ENCOUNTER — CARE COORDINATION (OUTPATIENT)
Dept: CASE MANAGEMENT | Age: 67
End: 2022-04-13

## 2022-04-13 ENCOUNTER — TELEPHONE (OUTPATIENT)
Dept: CARDIOLOGY CLINIC | Age: 67
End: 2022-04-13

## 2022-04-13 DIAGNOSIS — I51.7 CARDIOMEGALY: Primary | ICD-10-CM

## 2022-04-13 NOTE — TELEPHONE ENCOUNTER
Patient scheduled for CV tomorrow with Dr. Aurea Rice. Lab work shows increase in  Cr to 2.2. Made Shellie NP aware. Per NP \"Discontnue his lisinopril and hold Torsemide tomorrow. Recheck BMP in 5-7 days\". I called patient and made him aware. States he did not take Torsemide today, but had taken his Lisinopril. Instructed we will recheck labs in about a week.

## 2022-04-13 NOTE — CARE COORDINATION
Care Transitions Follow Up Call    Needs to be reviewed by the provider   Additional needs identified to be addressed with provider: No               Method of communication with provider : none      Care Transition Nurse (CTN) contacted the patient by telephone to follow up after admission on 03/15/2022. Verified name and  with patient as identifiers. CTN provided contact information for future needs. Plan for next call: cardio version - SOB - edema     Armando Nava states he is \"hanging in there\". He denies any SOB. Armando Nava states his edema has \"gone down\". He states his weight today = 191.0lbs which is down from 191.8lbs yesterday. Armando Nava states he is scheduled for a cardioversion tomorrow. He denies any needs at this time.     Julieta Magallon RN BSN  Care Transition Nurse  [phone]

## 2022-04-14 ENCOUNTER — HOSPITAL ENCOUNTER (OUTPATIENT)
Dept: CARDIAC CATH/INVASIVE PROCEDURES | Age: 67
Discharge: HOME | End: 2022-04-14
Payer: COMMERCIAL

## 2022-04-14 VITALS
OXYGEN SATURATION: 99 % | DIASTOLIC BLOOD PRESSURE: 104 MMHG | RESPIRATION RATE: 20 BRPM | TEMPERATURE: 97.2 F | HEART RATE: 89 BPM | HEIGHT: 68 IN | WEIGHT: 202 LBS | SYSTOLIC BLOOD PRESSURE: 142 MMHG | BODY MASS INDEX: 30.62 KG/M2

## 2022-04-14 LAB
EKG ATRIAL RATE: 79 BPM
EKG DIAGNOSIS: NORMAL
EKG P AXIS: 69 DEGREES
EKG P-R INTERVAL: 194 MS
EKG Q-T INTERVAL: 424 MS
EKG QRS DURATION: 100 MS
EKG QTC CALCULATION (BAZETT): 486 MS
EKG R AXIS: -41 DEGREES
EKG T AXIS: 70 DEGREES
EKG VENTRICULAR RATE: 79 BPM

## 2022-04-14 PROCEDURE — 99152 MOD SED SAME PHYS/QHP 5/>YRS: CPT | Performed by: INTERNAL MEDICINE

## 2022-04-14 PROCEDURE — 92960 CARDIOVERSION ELECTRIC EXT: CPT | Performed by: INTERNAL MEDICINE

## 2022-04-14 PROCEDURE — 93005 ELECTROCARDIOGRAM TRACING: CPT | Performed by: INTERNAL MEDICINE

## 2022-04-14 PROCEDURE — 2500000003 HC RX 250 WO HCPCS

## 2022-04-14 PROCEDURE — 93010 ELECTROCARDIOGRAM REPORT: CPT | Performed by: INTERNAL MEDICINE

## 2022-04-14 PROCEDURE — 2580000003 HC RX 258

## 2022-04-14 PROCEDURE — 92960 CARDIOVERSION ELECTRIC EXT: CPT

## 2022-04-14 RX ORDER — SODIUM CHLORIDE 9 MG/ML
25 INJECTION, SOLUTION INTRAVENOUS PRN
Status: DISCONTINUED | OUTPATIENT
Start: 2022-04-14 | End: 2022-04-15 | Stop reason: HOSPADM

## 2022-04-14 RX ORDER — SODIUM CHLORIDE 0.9 % (FLUSH) 0.9 %
5-40 SYRINGE (ML) INJECTION PRN
Status: DISCONTINUED | OUTPATIENT
Start: 2022-04-14 | End: 2022-04-15 | Stop reason: HOSPADM

## 2022-04-14 RX ORDER — SODIUM CHLORIDE 9 MG/ML
INJECTION, SOLUTION INTRAVENOUS CONTINUOUS
Status: DISCONTINUED | OUTPATIENT
Start: 2022-04-14 | End: 2022-04-15 | Stop reason: HOSPADM

## 2022-04-14 RX ORDER — SODIUM CHLORIDE 0.9 % (FLUSH) 0.9 %
5-40 SYRINGE (ML) INJECTION EVERY 12 HOURS SCHEDULED
Status: DISCONTINUED | OUTPATIENT
Start: 2022-04-14 | End: 2022-04-15 | Stop reason: HOSPADM

## 2022-04-14 NOTE — H&P
H&P Update - see note from 3/25/22    I have reviewed the history and physical and examined the patient and find no relevant changes. I have reviewed with the patient and/or family the risks, benefits, and alternatives to the procedure. Pre-sedation Assessment    Patient:  Candido Diaz   :   1955  Intended Procedure: DIANA/CV    Vitals:    22 0852   BP: (!) 142/104   Pulse: 89   Resp: 20   Temp: 97.2 °F (36.2 °C)   SpO2: 99%       Nursing notes reviewed and agreed. Medications reviewed  Allergies:    Allergies   Allergen Reactions    Atorvastatin-Coenzyme Q10 Diarrhea    Lodine [Etodolac]     Neurontin [Gabapentin]      edema    Pregabalin Swelling    Vioxx Diarrhea    Wellbutrin [Bupropion Hcl]          Pre-Procedure Assessment/Plan:  ASA 2 - Patient with mild systemic disease with no functional limitations    Mallampati Airway Assessment:  Mallampati Class I - (soft palate, fauces, uvula & anterior/posterior tonsillar pillars are visible)    Level of Sedation Plan:Mild sedation    Post Procedure plan: Return to same level of care    Mahogany Singleton MD [address] [facility], Interventional Cardiology, and Peripheral Vascular [address] [facility]   (C): [phone]  (O): [phone]

## 2022-04-14 NOTE — PROGRESS NOTES
External Cardioversion     Procedures performed:     IV sedation. Trans-esophageal echocardiography  External Electrical cardioversion      Indication of the procedure: Atrial fibrillation     Patient atrial fibrillation      Details of procedure: The patient was brought to the cath lab area in a fasting and non-sedated state. The risks, benefits and alternatives of the procedure were discussed with the patient. The patient opted to proceed with the procedure. Written informed consent was signed and placed in the chart. A timeout protocol was completed to identify the patient and the procedure being performed. Moderate Sedation:  Start time: 0915  Stop time: 0917  4mL brevital   An independent trained observed pushed medications at my direction. We monitored the patient's level of consciousness and vital signs/physiologic status throughout the procedure duration (see start and start times above). Electrical DC cardioversion was perfomred using 200J, synchronized shock. Patient was converted to sinus rhythm. The patient tolerated the procedure well and there were no complications. Patient was transported to the holding area in stable condition. Conclusion:     Successful external DC cardioversion of atrial fibrillation. Plan:      The patient can be discharged if remains stable. Will continue with anticoagulation therapy. Follow up in the office in 2-4 weeks.      Tricia Mckeon MD [address] [facility] and Inter[facility]   [address]   (C): [phone]  Rina Conklin): [phone]

## 2022-04-21 DIAGNOSIS — I51.7 CARDIOMEGALY: ICD-10-CM

## 2022-04-21 LAB
ANION GAP SERPL CALCULATED.3IONS-SCNC: 21 MMOL/L (ref 3–16)
BUN BLDV-MCNC: 34 MG/DL (ref 7–20)
CALCIUM SERPL-MCNC: 9.4 MG/DL (ref 8.3–10.6)
CHLORIDE BLD-SCNC: 100 MMOL/L (ref 99–110)
CO2: 17 MMOL/L (ref 21–32)
CREAT SERPL-MCNC: 2 MG/DL (ref 0.8–1.3)
GFR AFRICAN AMERICAN: 41
GFR NON-AFRICAN AMERICAN: 34
GLUCOSE BLD-MCNC: 82 MG/DL (ref 70–99)
POTASSIUM SERPL-SCNC: 5.9 MMOL/L (ref 3.5–5.1)
SODIUM BLD-SCNC: 138 MMOL/L (ref 136–145)

## 2022-04-22 ENCOUNTER — CARE COORDINATION (OUTPATIENT)
Dept: CASE MANAGEMENT | Age: 67
End: 2022-04-22

## 2022-04-22 NOTE — CARE COORDINATION
[facility] Transitions Follow Up Call    2022    Patient: Hattie German  Patient : 1955   MRN: [id number]  Reason for Admission: afib, CHF, COPD exacerbation  Discharge Date: 3/21/22 RARS: Readmission Risk Score: 10.6 ( )         Spoke with: Hattie German who stated that he is doing good. Patient states he do still get sob with exertion. Patient denies cp, cough, dizziness, headache, n/v, diarrhea, abdominal pains, fever, or chills. Patient report that appetite and fluid intake is good and denies any problems with bowel or bladder. Patient states that he is following fluid and diet restrictions. He states that he is weighing himself daily today 190 lbs. Patient is taking all medications as ordered. Denies any needs at this time. Patient instructed to continue to monitor s/s, reporting any that may present to MD immediately for early intervention. [facility] provided contact information for future needs. Informed patient that this would be our last out reached and he was in agreement. Episode Closed. Care Transitions Follow Up Call    Needs to be reviewed by the provider   Additional needs identified to be addressed with provider: No  none             Method of communication with provider : phone      Care Transition Nurse (CTN) contacted the patient by telephone to follow up after admission. Verified name and  with patient as identifiers. Addressed changes since last contact: none  Discussed follow-up appointments. If no appointment was previously scheduled, appointment scheduling offered: Yes. Is follow up appointment scheduled within 7 days of discharge? Yes.       Advance Care Planning   Healthcare Decision Maker:    Primary Decision Maker: Dnea Waters - [phone]    Secondary Decision Maker: Jose Perez [facility] - [phone]    CTN reviewed discharge instructions, medical action plan and red flags with patient and discussed any barriers to care and/or understanding of plan of care after discharge. Discussed appropriate site of care based on symptoms and resources available to patient including: PCP  Specialist  When to call 911. The patient agrees to contact the PCP office for questions related to their healthcare. Non-[facility] follow up appointment(s): na    CTN provided contact information for future needs. No further follow-up call indicated based on severity of symptoms and risk factors. Plan for next call: na         Care Transitions Subsequent and Final Call    Subsequent and Final Calls  Do you have any ongoing symptoms?: No  Have your medications changed?: No  Do you have any questions related to your medications?: No  Do you currently have any active services?: No  Do you have any needs or concerns that I can assist you with?: No  Identified Barriers: None  Care Transitions Interventions  No Identified Needs  Other Interventions:            Follow Up  Future Appointments   Date Time Provider Bernadine Acosta   5/4/2022  9:00 [facility], APRN - [facility]   5/13/2022  1:00 PM Alek Cha MD [address]   6/22/2022  9:30 AM Lydia Johnson MD [address] [place] [address]

## 2022-04-29 NOTE — PROGRESS NOTES
[address]  Office Visit    Connie Merlos  1955    May 4, 2022    CC:   Chief Complaint   Patient presents with    Follow-Up from Hospital     DCCV     HPI:  The patient is 73 y.o. male with a past medical history significant for smoker, COPD, HTN, HLP and PAF who is here for follow up post DCCV. He was hospitalized at [facility] from 3/15/2022-3/21/2022 with several day H/O increasing dyspnea with minimal exertion, worsening bilateral leg swelling. He was noted to be in atrial fib with RVR and required IV cardizem for rate control. He was treated with IV lasix and decreased LVEF noted on echo. It was felt he had new onset tachycardia induced cardiomyopathy. Plan was to schedule DCCV with Dr. Sherrell Patricio in 3 weeks and see Dr. Estephania Rodríguez in 6 weeks. On 4/14/2022 he underwent DCCV per Dr. Sherrell Patricio and here for follow up. Overall feeling okay. Continues to smoke 1.5 ppd. Chronically has some SOBOE (has COPD). Inhalers with some improvement. Chronically sleeps elevated. Has little to no cough. Palpitations markedly improved. Denies chest pain/discomfort, dizziness, syncope or claudication. Has some mild edema in his ankles at night and down when he arises in the AM (much better than it has been). Weight loss 13# since last visit. Walks on occasion (limited by his COPD). Review of Systems:  Constitutional: Denies weakness, night sweats or fever. + fatigue improving  HEENT: Denies new visual changes, ringing in ears, nosebleeds,nasal congestion  Respiratory: Denies new or change in SOB, PND, orthopnea or cough. Cardiovascular: see HPI  GI: Denies N/V, diarrhea, constipation, abdominal pain, change in bowel habits, melena or hematochezia  : Denies urinary frequency, urgency, incontinence, hematuria or dysuria. Skin: Denies rash, hives, or cyanosis  Musculoskeletal: Denies joint or muscle aches/pain  Neurological: Denies syncope or TIA-like symptoms.   Psychiatric: Denies anxiety, insomnia or depression     Past Medical History:   Diagnosis Date    Chronic pain syndrome     Chronic pain syndrome     COPD (chronic obstructive pulmonary disease) (HCC) 6/5/2012    DDD (degenerative disc disease), lumbar     Depression 6/5/2012    Diverticulitis 6/5/2012    DJD (degenerative joint disease) of knee     Foraminal stenosis of lumbar region     LORIE (generalized anxiety disorder) 6/5/2012    GERD (gastroesophageal reflux disease) 6/5/2012    HTN (hypertension) 6/5/2012    Incidental lung nodule     Insomnia     Lumbar radiculitis     Neuropathy 6/5/2012    Nontraumatic rupture of tendons of biceps (long head) 6/5/2012    Numbness and tingling of both legs     Pernicious anemia 6/5/2012    Sciatica     Sprain and strain of unspecified site of elbow and forearm 6/5/2012     Past Surgical History:   Procedure Laterality Date    CARDIOVERSION  04/14/2022    MUSCLE REPAIR Bilateral 8-10 years ago    Pulled bilateral biceps off bone from lifting weights.      Family History   Problem Relation Age of Onset    Arthritis Father     Other Father         GERD    Heart Disease Father         had heart attack     Social History     Tobacco Use    Smoking status: Current Every Day Smoker     Packs/day: 1.50     Years: 50.00     Pack years: 75.00     Types: Cigarettes    Smokeless tobacco: Never Used    Tobacco comment: pt smokes 1 pk and a half a day    Vaping Use    Vaping Use: Never used   Substance Use Topics    Alcohol use: No     Comment: no longer drinks    Drug use: Yes     Types: Marijuana (Weed)     Comment: daily use, \"like a cocktail\"       Allergies   Allergen Reactions    Atorvastatin-Coenzyme Q10 Diarrhea    Lodine [Etodolac]     Neurontin [Gabapentin]      edema    Pregabalin Swelling    Vioxx Diarrhea    Wellbutrin [Bupropion Hcl]      Current Outpatient Medications   Medication Sig Dispense Refill    NIFEdipine (PROCARDIA XL) 30 MG extended release tablet Take 1 tablet by mouth daily 90 tablet 1    amiodarone (CORDARONE) 200 MG tablet Take 1 tablet by mouth daily 30 tablet 2    metoprolol succinate (TOPROL XL) 50 MG extended release tablet Take 1 tablet by mouth 2 times daily 30 tablet 3    torsemide (DEMADEX) 20 MG tablet Take 1 tablet by mouth daily 30 tablet 3    apixaban (ELIQUIS) 5 MG TABS tablet Take 1 tablet by mouth 2 times daily 60 tablet 5    ezetimibe (ZETIA) 10 MG tablet Take 1 tablet by mouth daily 30 tablet 6    omeprazole (PRILOSEC) 40 MG delayed release capsule 1 tab po daily 90 capsule 3    albuterol sulfate HFA (VENTOLIN HFA) 108 (90 Base) MCG/ACT inhaler Inhale 2 puffs into the lungs 4 times daily as needed for Wheezing 1 each 2    budesonide-formoterol (SYMBICORT) 160-4.5 MCG/ACT AERO Inhale 2 puffs into the lungs 2 times daily 3 Inhaler 5     No current facility-administered medications for this visit. Physical Exam:   BP (!) 162/80   Pulse 59   Ht 5' 8\" (1.727 m)   Wt 189 lb (85.7 kg)   SpO2 98%   BMI 28.74 kg/m²   Wt Readings from Last 2 Encounters:   05/04/22 189 lb (85.7 kg)   04/14/22 202 lb (91.6 kg)     Constitutional: He is oriented to person, place, and time. He appears well-developed and well-nourished. In no acute distress. HEENT: Normocephalic and atraumatic. Sclerae anicteric. Xanthelasmas above both eyes. Neck: Supple. No JVD present. Carotids without bruits. No  thyromegaly present. Cardiovascular: RRR, normal S1 and S2; soft systolic murmur  Pulmonary/Chest: Effort normal.  Lungs with diminished breath sounds bilaterally. Chest wall nontender  Abdominal: soft, nontender, nondistended. + bowel sounds; no hepatomegaly  Extremities: No edema, cyanosis, or clubbing. Pulses are 2+ radial/carotid/DP/PT bilaterally. Cap refill brisk. Neurological: No focal deficit. Skin: Skin is warm and dry. Psychiatric: He has a normal mood and affect.  His speech is normal and behavior is normal.     Lab Review:   Lab Results   Component Value Date    TRIG 249 08/25/2021    HDL 44 08/25/2021    LDLCALC 193 08/25/2021    LABVLDL 48 08/25/2021     Lab Results   Component Value Date     04/21/2022    K 5.9 04/21/2022    K 2.8 03/15/2022     04/21/2022    CO2 17 04/21/2022    BUN 34 04/21/2022    CREATININE 2.0 04/21/2022    GLUCOSE 82 04/21/2022    CALCIUM 9.4 04/21/2022      Lab Results   Component Value Date    WBC 7.3 04/11/2022    HGB 16.8 04/11/2022    HCT 50.1 04/11/2022    MCV 98.7 04/11/2022     04/11/2022 5/4/2022 ECG  Sinus bradycardia with nonspecific ST-TW changes    4/14/2022 DIANA/DCCV:  Successful external DC cardioversion of atrial fibrillation. 3/18/2022 DIANA/DCCV  1. Successful transesophageal echocardiography with no evidence of  thrombus in left atrium, left atrial appendage or left ventricle. 2.  Unsuccessful attempt x3 at synchronized cardioversion from atrial  fibrillation with rapid ventricular response to a normal sinus rhythm. We will load the patient with 300 mg additional IV amiodarone. 3/18/2022 DIANA:  Normal left ventricular size and wall thickness with severely reduced function. Ejection fraction is visually estimated to be <20%. Regional wall motion abnormalities are noted. Ascending aorta has trivial plaque noted. Normal right ventricular size and function. Left atrial appendage, left atrium and left ventricle visualized with no evidence of thrombus. Ascending aorta has mild atherosclerotic plaque noted. There are no significant valvular abnormalities appreciated ion this study. 3/17/2022 Echo:   Left ventricular cavity size is normal.   Normal left ventricular wall thickness. Ejection fraction is severely visually estimated to be 20%. Regional wall motion abnormalities are noted. Indeterminate diastolic function. The left atrium is moderately dilated. Normal right ventricular size. Right ventricular systolic function is moderately-severely reduced.    Mild tricuspid regurgitation. Moreno Beverly estimated pulmonary artery systolic pressure is normal at 32 mmHg assuming a   right atrial pressure of 8 mmHg. 2/9/2018 Echo:   Left ventricular size is mildly increased. Normal left ventricular wall thickness. Left ventricular function is normal with ejection fraction estimated at   55-60 %. There is reversal of E/A inflow velocities across the mitral valve. Mild thickening of anterior leaflet of mitral valve. Trivial mitral regurgitation is present. Trivial tricuspid regurgitation. Trivial pulmonic regurgitation present. 2/9/2018 Stress Myoview:   Baseline ECG makes stress test difficult to interpret but no clear ischemic   ECG changes were seen   frequent PVC developing during the exercise phase   Poor exercise capacity    2/14/2015 [facility]:     LEFT VENTRICULOGRAM:  Reveals a normal left ventricular systolic function  with an estimated EF of 50% to 55%. OVERALL IMPRESSION:    1. Patent, nondominant right coronary artery. 2.  Patent left main trunk. 3.  Patent left anterior descending artery and its branches. 4.  Patent dominant circumflex artery and its branches. 5.  Normal left ventricular systolic function. 6.  Patent right femoral, common femoral artery. 2/12/2015 Echo   Normal LV size and systolic function. Estimated ejection fraction is 55-60%. There is trivial tricuspid regurgitation with RVSP estimated at 32 mmHg. Assessment:    1. Primary hypertension  -not well controlled  -goal BP < 130/80  -continue medical management with adjustment in medications    2. Paroxysmal atrial fibrillation(HCC)  -remains in sinus bradycardia  -S/P DCCV on 4/14/2022  -decrease Amiodarone  -continue Toprol and Eliquis  -scheduled to follow up with Dr. Anitha Langley    3.  Other cardiomyopathy ([facility]  -LVEF < 20%  -suspect tachycardia induced and S/P failed DCCV x 3 attempts in past with successful DCCV on 4/14/2022  -would recommend consideration of stress testing after his DCCV if restoration of SR and no improvement in LVEF  -continue Toprol and torsemide    4. Smoking addiction  -smoking cessation discussed    5. Chronic systolic heart failure (HCC)  -compensated; NYHA class III; LVEF < 20%  -not on aldactone/ACE/ARB/SGLT2 inhibitor given increased Cr and K+  -low sodium diet and fluid restriction   -reevaluate LVEF in 90 days: if LVEF < 35% consider ICD therapy (will need ischemic eval prior)    6. Hyperkalemia  -has had elevated K+ and taken off lisinopril  -received Kaexylate and was to have follow up labs  -refer for labs today    7. Chronic kidney disease, stage IIIa ([facility]  -continue to monitor Cr    Plan:  Decrease Amiodarone to 200 mg daily  Add Nifedipine 30 mg daily  Continue Toprol, Eliquis, zetia, torsemide  Check BMP  Emphasized and discussed low-fat/low sodium diet, monitoring of daily weights, fluid restriction, worsening signs and symptoms of heart failure and when to call, and the importance of regular exercise and activity. Emphasized and discussed strategies for smoking cessation (> 3-5 minutes of counseling given)  Follow up later this month with Dr. Mercedes Delgado as scheduled  Follow up with Dr. Adina Schaffer in 4-6 months or sooner if needed    Return in 1 month (on 6/4/2022) for with Dr. Mercedes Delgado; 4-6 months with Dr. Adina Schaffer. Thanks for allowing me to participate in the care of this patient.       MINDI Russell  [facility], [address] [place] [address] [address] [facility] [address], [address] [place] [address]  Office: [phone]  Fax: [phone]      Electronically signed by GUICHO Souza CNP on 5/4/2022 at 9:41 AM

## 2022-05-04 ENCOUNTER — OFFICE VISIT (OUTPATIENT)
Dept: CARDIOLOGY CLINIC | Age: 67
End: 2022-05-04
Payer: COMMERCIAL

## 2022-05-04 VITALS
OXYGEN SATURATION: 98 % | HEIGHT: 68 IN | DIASTOLIC BLOOD PRESSURE: 80 MMHG | WEIGHT: 189 LBS | SYSTOLIC BLOOD PRESSURE: 162 MMHG | BODY MASS INDEX: 28.64 KG/M2 | HEART RATE: 59 BPM

## 2022-05-04 DIAGNOSIS — F17.200 SMOKING ADDICTION: ICD-10-CM

## 2022-05-04 DIAGNOSIS — I42.8 OTHER CARDIOMYOPATHY (HCC): ICD-10-CM

## 2022-05-04 DIAGNOSIS — I10 PRIMARY HYPERTENSION: Primary | ICD-10-CM

## 2022-05-04 DIAGNOSIS — I48.0 PAROXYSMAL ATRIAL FIBRILLATION (HCC): ICD-10-CM

## 2022-05-04 DIAGNOSIS — I10 PRIMARY HYPERTENSION: ICD-10-CM

## 2022-05-04 DIAGNOSIS — I50.22 CHRONIC SYSTOLIC HEART FAILURE (HCC): ICD-10-CM

## 2022-05-04 DIAGNOSIS — N18.31 STAGE 3A CHRONIC KIDNEY DISEASE (HCC): ICD-10-CM

## 2022-05-04 LAB
ANION GAP SERPL CALCULATED.3IONS-SCNC: 16 MMOL/L (ref 3–16)
BUN BLDV-MCNC: 27 MG/DL (ref 7–20)
CALCIUM SERPL-MCNC: 9.9 MG/DL (ref 8.3–10.6)
CHLORIDE BLD-SCNC: 98 MMOL/L (ref 99–110)
CO2: 23 MMOL/L (ref 21–32)
CREAT SERPL-MCNC: 1.5 MG/DL (ref 0.8–1.3)
GFR AFRICAN AMERICAN: 57
GFR NON-AFRICAN AMERICAN: 47
GLUCOSE BLD-MCNC: 101 MG/DL (ref 70–99)
POTASSIUM SERPL-SCNC: 5.1 MMOL/L (ref 3.5–5.1)
SODIUM BLD-SCNC: 137 MMOL/L (ref 136–145)

## 2022-05-04 PROCEDURE — 93000 ELECTROCARDIOGRAM COMPLETE: CPT | Performed by: NURSE PRACTITIONER

## 2022-05-04 PROCEDURE — 1111F DSCHRG MED/CURRENT MED MERGE: CPT | Performed by: NURSE PRACTITIONER

## 2022-05-04 PROCEDURE — 4004F PT TOBACCO SCREEN RCVD TLK: CPT | Performed by: NURSE PRACTITIONER

## 2022-05-04 PROCEDURE — 4040F PNEUMOC VAC/ADMIN/RCVD: CPT | Performed by: NURSE PRACTITIONER

## 2022-05-04 PROCEDURE — 1123F ACP DISCUSS/DSCN MKR DOCD: CPT | Performed by: NURSE PRACTITIONER

## 2022-05-04 PROCEDURE — 3017F COLORECTAL CA SCREEN DOC REV: CPT | Performed by: NURSE PRACTITIONER

## 2022-05-04 PROCEDURE — G8417 CALC BMI ABV UP PARAM F/U: HCPCS | Performed by: NURSE PRACTITIONER

## 2022-05-04 PROCEDURE — 99214 OFFICE O/P EST MOD 30 MIN: CPT | Performed by: NURSE PRACTITIONER

## 2022-05-04 PROCEDURE — G8427 DOCREV CUR MEDS BY ELIG CLIN: HCPCS | Performed by: NURSE PRACTITIONER

## 2022-05-04 RX ORDER — NIFEDIPINE 30 MG/1
30 TABLET, EXTENDED RELEASE ORAL DAILY
Qty: 90 TABLET | Refills: 1 | Status: SHIPPED
Start: 2022-05-04 | End: 2022-06-20 | Stop reason: HOSPADM

## 2022-05-04 RX ORDER — AMIODARONE HYDROCHLORIDE 200 MG/1
200 TABLET ORAL DAILY
Qty: 30 TABLET | Refills: 2
Start: 2022-05-04 | End: 2022-06-22 | Stop reason: SDUPTHER

## 2022-05-04 NOTE — PATIENT INSTRUCTIONS
Decrease Amiodarone to 200 mg tablet daily    Add Nifedipine Xl 30 mg daily    Continue other medications    Labs: BMP today

## 2022-05-20 DIAGNOSIS — I10 PRIMARY HYPERTENSION: Primary | ICD-10-CM

## 2022-05-20 DIAGNOSIS — N18.31 STAGE 3A CHRONIC KIDNEY DISEASE (HCC): ICD-10-CM

## 2022-05-20 DIAGNOSIS — I42.8 OTHER CARDIOMYOPATHY (HCC): ICD-10-CM

## 2022-05-26 NOTE — PROCEDURES
External Cardioversion     Procedures performed:     IV sedation. Trans-esophageal echocardiography  External Electrical cardioversion      Indication of the procedure: Atrial fibrillation     Patient atrial fibrillation      Details of procedure:      The patient was brought to the cath lab area in a fasting and non-sedated state. The risks, benefits and alternatives of the procedure were discussed with the patient. The patient opted to proceed with the procedure. Written informed consent was signed and placed in the chart.  A timeout protocol was completed to identify the patient and the procedure being performed.      Moderate Sedation:  Start time: 0915  Stop time: 0917  4mL brevital   An independent trained observed pushed medications at my direction. We monitored the patient's level of consciousness and vital signs/physiologic status throughout the procedure duration (see start and start times above).     Electrical DC cardioversion was perfomred using 200J, synchronized shock. Patient was converted to sinus rhythm.      The patient tolerated the procedure well and there were no complications. Patient was transported to the holding area in stable condition.      Conclusion:      Successful external DC cardioversion of atrial fibrillation.      Plan:      The patient can be discharged if remains stable.  Will continue with anticoagulation therapy.  Follow up in the office in 2-4 weeks.      Mahogany Singleton MD [address] [facility]   [facility]   (C): [phone]  [place] Agent): [phone]

## 2022-05-29 NOTE — PROGRESS NOTES
Cardiac Electrophysiology Consultation   Date: 6/1/2022   Reason for Consultation:   Consult Requesting Physician: Ashutosh Yoon MD  Primary Care Physician: Yolanda Galindo MD     Chief Complaint: No chief complaint on file. HPI: Yajaira Mayer is a 73 y.o. patient with a history of smoker (1.5 PPD) COPD, HTN, and paroxysmal atrial fibrillation. He was first diagnosed with atrial fibrillation on ***. He was hospitalized at [facility] from 3/15-3/21/2022 after presenting to the [facility]  reporting symptoms of bilateral lower extremity swelling and shortness of breath and EKG showed atrial fibrillation with RVR. It was felt that he had new onset tachycarida induced cardiomyopathy. He underwent successful cardioversion to NSR with Dr. Ashutosh Yoon MD on 4/14/2022. Today, he presents of office for hospital follow up to discuss treatment options for his atrial fibrillation. Past Medical History:   Diagnosis Date    Chronic pain syndrome     Chronic pain syndrome     COPD (chronic obstructive pulmonary disease) (HCC) 6/5/2012    DDD (degenerative disc disease), lumbar     Depression 6/5/2012    Diverticulitis 6/5/2012    DJD (degenerative joint disease) of knee     Foraminal stenosis of lumbar region     LORIE (generalized anxiety disorder) 6/5/2012    GERD (gastroesophageal reflux disease) 6/5/2012    HTN (hypertension) 6/5/2012    Incidental lung nodule     Insomnia     Lumbar radiculitis     Neuropathy 6/5/2012    Nontraumatic rupture of tendons of biceps (long head) 6/5/2012    Numbness and tingling of both legs     Pernicious anemia 6/5/2012    Sciatica     Sprain and strain of unspecified site of elbow and forearm 6/5/2012      Past Surgical History:   Procedure Laterality Date    CARDIOVERSION  04/14/2022    MUSCLE REPAIR Bilateral 8-10 years ago    Pulled bilateral biceps off bone from lifting weights. Allergies:   Allergies   Allergen Reactions    Atorvastatin-Coenzyme Q10 Diarrhea    Lodine [Etodolac]     Neurontin [Gabapentin]      edema    Pregabalin Swelling    Vioxx Diarrhea    Wellbutrin [Bupropion Hcl]        Medication:   Prior to Admission medications    Medication Sig Start Date End Date Taking? Authorizing Provider   NIFEdipine (PROCARDIA XL) 30 MG extended release tablet Take 1 tablet by mouth daily 5/4/22   GUICHO Billingsley CNP   amiodarone (CORDARONE) 200 MG tablet Take 1 tablet by mouth daily 5/4/22   GUICHO Billingsley CNP   metoprolol succinate (TOPROL XL) 50 MG extended release tablet Take 1 tablet by mouth 2 times daily 3/21/22   Lydia Johnson MD   torsemide (DEMADEX) 20 MG tablet Take 1 tablet by mouth daily 3/22/22   Lydia Johnson MD   apixaban (ELIQUIS) 5 MG TABS tablet Take 1 tablet by mouth 2 times daily 1/21/22   Wong Zarate MD   ezetimibe (ZETIA) 10 MG tablet Take 1 tablet by mouth daily 1/21/22   Wong Zarate MD   omeprazole (PRILOSEC) 40 MG delayed release capsule 1 tab po daily 11/30/21   Lydia Johnson MD   albuterol sulfate HFA (VENTOLIN HFA) 108 (90 Base) MCG/ACT inhaler Inhale 2 puffs into the lungs 4 times daily as needed for Wheezing 11/30/21   Lydia Johnson MD   budesonide-formoterol (SYMBICORT) 160-4.5 MCG/ACT AERO Inhale 2 puffs into the lungs 2 times daily 11/23/20   Lydia Johnson MD       Social History:   reports that he has been smoking cigarettes. He has a 75.00 pack-year smoking history. He has never used smokeless tobacco. He reports current drug use. Drug: Marijuana [place] Other). He reports that he does not drink alcohol. Family History:  family history includes Arthritis in his father; Heart Disease in his father; Other in his father. Reviewed. Denies family history of sudden cardiac death, arrhythmia, premature CAD    Review of System:  Pertinent positive and negatives are in the HPI, the rest are negative.      Physical Examination:  There were no vitals taken for this visit. · Constitutional: Oriented. No distress. · Head: Normocephalic and atraumatic. · Mouth/Throat: Oropharynx is clear and moist.   · Eyes: Conjunctivae normal. EOM are normal.   · Neck: Normal range of motion. Neck supple. No rigidity. No JVD present. · Cardiovascular: Normal rate, regular rhythm, S1&S2 and intact distal pulses. · Pulmonary/Chest: Bilateral respiratory sounds. No wheezes. No rhonchi. · Abdominal: Soft. Bowel sounds present. No distension, No tenderness. · Musculoskeletal: No tenderness. No edema    · Lymphadenopathy: Has no cervical adenopathy. · Neurological: Alert and oriented. Cranial nerve appears intact, No Gross deficit   · Skin: Skin is warm and dry. No rash noted. · Psychiatric: Has a normal mood, affect and behavior     Labs:  Reviewed. ECG: reviewed, ***Sinus  rhythm with v-rate of *** bpm with QRS duration *** ms. No ventricular pre-excitation, or QT prolongation. Studies:   1. Event monitor:       2. Echo: 3/15/2022  Summary  Left ventricular cavity size is normal.  Normal left ventricular wall thickness. Ejection fraction is severely visually estimated to be 20%. Regional wall motion abnormalities are noted. Indeterminate diastolic function. The left atrium is moderately dilated. Normal right ventricular size. Right ventricular systolic function is moderately-severely reduced. Mild tricuspid regurgitation. .  Estimated pulmonary artery systolic pressure is normal at 32 mmHg assuming a right atrial pressure of 8 mmHg. Echo 2/9/2018:  Left ventricular size is mildly increased. Normal left ventricular wall thickness. Left ventricular function is normal with ejection fraction estimated at 55-60 %. There is reversal of E/A inflow velocities across the mitral valve. Mild thickening of anterior leaflet of mitral valve. Trivial mitral regurgitation is present. Trivial tricuspid regurgitation.   Trivial pulmonic regurgitation present. 3. Stress Test:  2/9/2018 Stress Myoview:   Baseline ECG makes stress test difficult to interpret but no clear ischemic   ECG changes were seen   frequent PVC developing during the exercise phase   Poor exercise capacity    4. Cath: 2/14/2015 [facility]     LEFT VENTRICULOGRAM:  Reveals a normal left ventricular systolic function with an estimated EF of 50% to 55%.       OVERALL IMPRESSION:    1.  Patent, nondominant right coronary artery.    2.  Patent left main trunk.    3.  Patent left anterior descending artery and its branches.    4.  Patent dominant circumflex artery and its branches.    5.  Normal left ventricular systolic function.    6.  Patent right femoral, common femoral artery.          I independently reviewed the ECG, MCOT, echocardiogram, stress test, and coronary angiography/PCI results and used them for my plan of care. Procedures:  1. Unsuccessful attempt x3 at synchronized cardioversion from atrial  fibrillation with rapid ventricular response to a normal sinus rhythm with Dr. Shiva Mae MD on 3/28/2022. Successful external DC cardioversion of atrial fibrillation with Dr. Gallito Aldana MD on 4/14/2022. Assessment/Plan:     Atrial fibrillation   - s/p DIANA and DCCV x3 (unsuccessfully). - Continue amiodarone 200 mg daily, with routine checking of liver and thyroid function Q4-6 months, yearly PFTs and ophthalmology. TSH    AST ALT   -He has a [id number] Score 3 (CHF, HTN, AGE)    -Continue Eliquis 5 mg BID for  thromboembolic risk reduction.   -Tolerating well no signs or symptoms of abnormal bruising or bleeding.   - Continue metoprolol succinate 50 mg daily for rate control of atrial fibrillation and HF. Chronic systolic heart failure  HFrEF - severely depressed LV systolic function. -NYHA class III   -LVEF <20 % per Echo 3/15/2022              - depressed systolic function presumed to be rate related. Last normal LV function 2/2018. Last normal angiogram 2/2015. -Continue guideline directed medical therapy. Beta Blocker: metoprolol succinate 50 mg BID. Diuretic: Torsemide 20 mg daily,               ACE/ARB/ ARNI: Lisinopril 5 mg daily  Euvolemic.              - repeat TTE in 90 days, if LVEF still <35% then Cor angiogram (last normal was 7 years ago). If NICM then ICD for primary prevention.        Thank you for allowing me to participate in the care of Mann Kruger. All questions and concerns were addressed to the patient/family. Alternatives to my treatment were discussed.      This note was scribed in the presence of Adrien Delgado MD by Belgica Ceja RN.    ***    Adrien Delgado MD  Cardiac Electrophysiology  [facility]

## 2022-06-01 ENCOUNTER — OFFICE VISIT (OUTPATIENT)
Dept: CARDIOLOGY CLINIC | Age: 67
End: 2022-06-01
Payer: MEDICARE

## 2022-06-01 ENCOUNTER — TELEPHONE (OUTPATIENT)
Dept: CARDIOLOGY CLINIC | Age: 67
End: 2022-06-01

## 2022-06-01 VITALS
DIASTOLIC BLOOD PRESSURE: 84 MMHG | SYSTOLIC BLOOD PRESSURE: 142 MMHG | OXYGEN SATURATION: 97 % | HEIGHT: 68 IN | WEIGHT: 189.6 LBS | BODY MASS INDEX: 28.73 KG/M2 | HEART RATE: 42 BPM

## 2022-06-01 DIAGNOSIS — I42.8 OTHER CARDIOMYOPATHY (HCC): Primary | ICD-10-CM

## 2022-06-01 DIAGNOSIS — I48.0 PAROXYSMAL ATRIAL FIBRILLATION (HCC): ICD-10-CM

## 2022-06-01 PROCEDURE — 99215 OFFICE O/P EST HI 40 MIN: CPT | Performed by: INTERNAL MEDICINE

## 2022-06-01 PROCEDURE — 1123F ACP DISCUSS/DSCN MKR DOCD: CPT | Performed by: INTERNAL MEDICINE

## 2022-06-01 PROCEDURE — G8427 DOCREV CUR MEDS BY ELIG CLIN: HCPCS | Performed by: INTERNAL MEDICINE

## 2022-06-01 PROCEDURE — G8417 CALC BMI ABV UP PARAM F/U: HCPCS | Performed by: INTERNAL MEDICINE

## 2022-06-01 PROCEDURE — 93000 ELECTROCARDIOGRAM COMPLETE: CPT | Performed by: INTERNAL MEDICINE

## 2022-06-01 PROCEDURE — 3017F COLORECTAL CA SCREEN DOC REV: CPT | Performed by: INTERNAL MEDICINE

## 2022-06-01 PROCEDURE — 4004F PT TOBACCO SCREEN RCVD TLK: CPT | Performed by: INTERNAL MEDICINE

## 2022-06-01 RX ORDER — METOPROLOL SUCCINATE 50 MG/1
25 TABLET, EXTENDED RELEASE ORAL 2 TIMES DAILY
Qty: 30 TABLET | Refills: 3
Start: 2022-06-01 | End: 2022-06-22 | Stop reason: SDUPTHER

## 2022-06-01 NOTE — LETTER
[address] [facility] KRISTA Montero  Phone: [phone]  Fax: [phone]      June 1, 2022    [address] [place] [address]      Dear Gabby Victoria:    Procedure is scheduled on 6/20/22 at 11:30. Please arrive at 10 am.     Procedure Instructions   1. You will need to fast (nothing to eat or drink) for at least 8 hours prior to procedure. 2. You may hold your diuretic Torsemide the morning of the procedure for comfort to decrease urinary urgency. 3. Hold your Eliquis 12 hours prior to procedure. 4. Do not use any lotions, creams or perfume the morning of procedure. 5. You will need to complete pre-procedure lab work 5-7 days prior to your procedure. 6. Please have a responsible adult to drive you home after procedure, you should go home same day, but there is always a possibility of an overnight stay. 7. Cath lab will provide you with all post procedure instructions    If you have any questions or concerns, please don't hesitate to call.     Sincerely,    Eveline Cook Prevention of UTI:  D- Manosse 25 mg qd x 6 m (over the counter)  Suppression Therapy with daily Bactrim  Despues de las relaciones, orinar dentro de 30 minutos y limpiar el area vaginal con panitos humedos o noa un eric.   Mantener el area ventilada, dormir sin pantaloncito, solo con la pijama, usar faldas o vestidos cuando sea posible.

## 2022-06-01 NOTE — PATIENT INSTRUCTIONS
Patient Education        Atrial Fibrillation: Care Instructions  Your Care Instructions     Atrial fibrillation is an irregular and often fast heartbeat. Treating this condition is important for several reasons. It can cause blood clots, which can travel from your heart to your brain and cause a stroke. If you have a fast heartbeat, you may feel lightheaded, dizzy, and weak. An irregular heartbeatcan also increase your risk for heart failure. Atrial fibrillation is often the result of another heart condition, such as high blood pressure or coronary artery disease. Making changes to improve yourheart condition will help you stay healthy and active. Follow-up care is a key part of your treatment and safety. Be sure to make and go to all appointments, and call your doctor if you are having problems. It's also a good idea to know your test results and keep alist of the medicines you take. How can you care for yourself at home? Medicines     Take your medicines exactly as prescribed. Call your doctor if you think you are having a problem with your medicine. You will get more details on the specific medicines your doctor prescribes.      If your doctor has given you a blood thinner to prevent a stroke, be sure you get instructions about how to take your medicine safely. Blood thinners can cause serious bleeding problems.      Do not take any vitamins, over-the-counter drugs, or herbal products without talking to your doctor first.   Lifestyle changes     Do not smoke. Smoking can increase your chance of a stroke and heart attack. If you need help quitting, talk to your doctor about stop-smoking programs and medicines. These can increase your chances of quitting for good.      Eat a heart-healthy diet.      Stay at a healthy weight. Lose weight if you need to.      Limit alcohol to 2 drinks a day for men and 1 drink a day for women. Too much alcohol can cause health problems.      Avoid colds and flu.  Get a pneumococcal vaccine shot. If you have had one before, ask your doctor whether you need another dose. Get a flu shot every year. If you must be around people with colds or flu, wash your hands often. Activity     If your doctor recommends it, get more exercise. Walking is a good choice. Bit by bit, increase the amount you walk every day. Try for at least 30 minutes on most days of the week. You also may want to swim, bike, or do other activities. Your doctor may suggest that you join a cardiac rehabilitation program so that you can have help increasing your physical activity safely.      Start light exercise if your doctor says it is okay. Even a small amount will help you get stronger, have more energy, and manage stress. Walking is an easy way to get exercise. Start out by walking a little more than you did in the hospital. Gradually increase the amount you walk.      When you exercise, watch for signs that your heart is working too hard. You are pushing too hard if you cannot talk while you are exercising. If you become short of breath or dizzy or have chest pain, sit down and rest immediately.      Check your pulse regularly. Place two fingers on the artery at the palm side of your wrist, in line with your thumb. If your heartbeat seems uneven or fast, talk to your doctor. When should you call for help? Call 911 anytime you think you may need emergency care. For example, call if:     You have symptoms of a heart attack. These may include:  ? Chest pain or pressure, or a strange feeling in the chest.  ? Sweating. ? Shortness of breath. ? Nausea or vomiting. ? Pain, pressure, or a strange feeling in the back, neck, jaw, or upper belly or in one or both shoulders or arms. ? Lightheadedness or sudden weakness. ? A fast or irregular heartbeat. After you call 911, the  may tell you to chew 1 adult-strength or 2 to 4 low-dose aspirin. Wait for an ambulance.  Do not try to drive yourself.      You have symptoms of a stroke. These may include:  ? Sudden numbness, tingling, weakness, or loss of movement in your face, arm, or leg, especially on only one side of your body. ? Sudden vision changes. ? Sudden trouble speaking. ? Sudden confusion or trouble understanding simple statements. ? Sudden problems with walking or balance. ? A sudden, severe headache that is different from past headaches.      You passed out (lost consciousness). Call your doctor now or seek immediate medical care if:     You have new or increased shortness of breath.      You feel dizzy or lightheaded, or you feel like you may faint.      Your heart rate becomes irregular.      You can feel your heart flutter in your chest or skip heartbeats. Tell your doctor if these symptoms are new or worse. Watch closely for changes in your health, and be sure to contact your doctor ifyou have any problems. Where can you learn more? Go to [email or web address].[email or web address]. org and sign in to your [employer] account. Enter U020 in the Rezdy box to learn more about \"Atrial Fibrillation: Care Instructions. \"     If you do not have an account, please click on the \"Sign Up Now\" link. Current as of: January 10, 2022               Content Version: 13.2  © 2006-2022 Healthwise, Incorporated. Care instructions adapted under license by [facility] ([facility]). If you have questions about a medical condition or this instruction, always ask your healthcare professional. [place] [address] any warranty or liability for your use of this information.        .David Krishnamurthy

## 2022-06-01 NOTE — TELEPHONE ENCOUNTER
Pt is agreeable to date/time. Went over pre-procedure instructions. Pt v/u. Published on [employer] and e-mail to Ingrid Jose. Procedure is scheduled on 6/20/22 at 11:30. Arrival time is 10 am.     Procedure Instructions   1. You will need to fast (nothing to eat or drink) for at least 8 hours prior to procedure. 2. You may hold your diuretic  Torsemide the morning of the procedure for comfort to decrease urinary urgency. 3. Hold your Eliquis 12 hours prior to procedure. 4. Do not use any lotions, creams or perfume the morning of procedure. 5. You will need to complete pre-procedure lab work 5-7 days prior to your procedure. 6. Please have a responsible adult to drive you home after procedure, you should go home same day, but there is always a possibility of an overnight stay.   7. Cath lab will provide you with all post procedure instructions

## 2022-06-01 NOTE — TELEPHONE ENCOUNTER
Patient seen in the office 6/1/2022 and needs to be scheduled for atrial fibrillation ablation with Dr Mathew Armstrong. If you have any question regarding your  or would like to proceed with scheduling please contact Dr. Zahida Mixon at [phone]. Pre procedure Instructions    Date:______________________________    Melissa Regan at:__________________________    Procedure time:____________________    The morning of your procedure you will park in the hospital parking lot and report directly to the cath lab to check in. At the information desk stay right and go all the way to the end of the reed, this will take you directly to your check in desk for the cath lab. Pre-Procedure Instructions   1. You will need to fast (nothing to eat or drink) for at least 8 hours prior to procedure. 2. You may hold your diuretic  Torsemide the morning of the procedure for comfort to decrease urinary urgency. 3. If you are a diabetic you will need to hold all diabetic medications including, Metformin the morning of the procedure. If you take Lantus/Levemir only take ½ your normal dose the evening before. 4. Do not use any lotions, creams or perfume the morning of procedure. 5. You will need to complete pre-procedure lab work 5-7 days prior to your procedure. 6.  A COVID test will be completed upon arrival the day of your procedure. If you have been fully vaccinated for COVID 19 at least 14 days prior to your procedure bring documentation of your vaccination with you the day of your procedure and you will NOT be required to get a COVID test.  7. Please have a responsible adult to drive you home after procedure, you should go home same day, but there is always a possibility of an overnight stay.   8. Cath lab will provide you with all post procedure instructions      ________________________________________________________________________________________________

## 2022-06-01 NOTE — PROGRESS NOTES
Cardiac Electrophysiology Consultation   Date: 6/1/2022   Reason for Consultation:   Consult Requesting Physician: Braulio Gipson MD  Primary Care Physician: Dalia Engel MD     Chief Complaint:   Chief Complaint   Patient presents with    Follow-up     afib        HPI: Sarmad Rodriguez is a 73 y.o. patient with a history of smoker (1.5 PPD) COPD, HTN, and paroxysmal atrial fibrillation. He was first diagnosed with atrial fibrillation during the recent hospitalization at [facility]. He was hospitalized at [facility] from 3/15-3/21/2022 after presenting to the [facility] ED  reporting symptoms of bilateral lower extremity swelling and shortness of breath and EKG showed atrial fibrillation with RVR. Echocardiogram showed globally low LVEF. It was felt that he had new onset tachycarida induced cardiomyopathy. He had unsuccessful DCCV x3 in hospital. Amiodarone loading was started and scheduled for outpatient DCCV. He underwent successful cardioversion to NSR with Dr. Braulio Gipson MD on 4/14/2022. Today, he presents of office for hospital follow up to discuss treatment options for his atrial fibrillation accompanied by his son. He states that he does have intermittent shortness of breath and occasional swelling in his lower extremities. His shortness of breath has improved with exertion.        Past Medical History:   Diagnosis Date    Chronic pain syndrome     Chronic pain syndrome     COPD [facility]chronic obstructive pulmonary disease) ([facility]) 6/5/2012    DDD (degenerative disc disease), lumbar     Depression 6/5/2012    Diverticulitis 6/5/2012    DJD (degenerative joint disease) of knee     Foraminal stenosis of lumbar region     LORIE (generalized anxiety disorder) 6/5/2012    GERD (gastroesophageal reflux disease) 6/5/2012    HTN (hypertension) 6/5/2012    Incidental lung nodule     Insomnia     Lumbar radiculitis     Neuropathy 6/5/2012    Nontraumatic rupture of tendons of biceps (long head) 6/5/2012    Numbness and tingling of both legs     Pernicious anemia 6/5/2012    Sciatica     Sprain and strain of unspecified site of elbow and forearm 6/5/2012      Past Surgical History:   Procedure Laterality Date    CARDIOVERSION  04/14/2022    MUSCLE REPAIR Bilateral 8-10 years ago    Pulled bilateral biceps off bone from lifting weights. Allergies: Allergies   Allergen Reactions    Atorvastatin-Coenzyme Q10 Diarrhea    Lodine [Etodolac]     Neurontin [Gabapentin]      edema    Pregabalin Swelling    Vioxx Diarrhea    Wellbutrin [Bupropion Hcl]        Medication:   Prior to Admission medications    Medication Sig Start Date End Date Taking? Authorizing Provider   NIFEdipine (PROCARDIA XL) 30 MG extended release tablet Take 1 tablet by mouth daily 5/4/22  Yes GUICHO Garcia - CNP   amiodarone (CORDARONE) 200 MG tablet Take 1 tablet by mouth daily 5/4/22  Yes GUICHO Lim - CNP   metoprolol succinate (TOPROL XL) 50 MG extended release tablet Take 1 tablet by mouth 2 times daily 3/21/22  Yes Domenica Diaz MD   torsemide (DEMADEX) 20 MG tablet Take 1 tablet by mouth daily 3/22/22  Yes Domenica Diaz MD   apixaban (ELIQUIS) 5 MG TABS tablet Take 1 tablet by mouth 2 times daily 1/21/22  Yes Estela Leung MD   ezetimibe (ZETIA) 10 MG tablet Take 1 tablet by mouth daily 1/21/22  Yes Estela Leung MD   omeprazole (PRILOSEC) 40 MG delayed release capsule 1 tab po daily 11/30/21  Yes Domenica Diaz MD   albuterol sulfate HFA (VENTOLIN HFA) 108 (90 Base) MCG/ACT inhaler Inhale 2 puffs into the lungs 4 times daily as needed for Wheezing 11/30/21  Yes Domenica Diaz MD   budesonide-formoterol (SYMBICORT) 160-4.5 MCG/ACT AERO Inhale 2 puffs into the lungs 2 times daily 11/23/20  Yes Domenica Diaz MD       Social History:   reports that he has been smoking cigarettes. He has a 75.00 pack-year smoking history.  He has never used smokeless tobacco. He reports current drug use. Drug: Marijuana Lina Aver). He reports that he does not drink alcohol. Family History:  family history includes Arthritis in his father; Heart Disease in his father; Other in his father. Reviewed. Denies family history of sudden cardiac death, arrhythmia, premature CAD    Review of System:  Pertinent positive and negatives are in the HPI, the rest are negative. Physical Examination:  BP (!) 142/84 (Site: Right Upper Arm)   Pulse (!) 42   Ht 5' 8\" (1.727 m)   Wt 189 lb 9.6 oz (86 kg)   SpO2 97%   BMI 28.83 kg/m²      · Constitutional: Oriented. No distress. · Head: Normocephalic and atraumatic. · Mouth/Throat: Oropharynx is clear and moist.   · Eyes: Conjunctivae normal. EOM are normal.   · Neck: Normal range of motion. Neck supple. No rigidity. No JVD present. · Cardiovascular: Normal rate, regular rhythm, S1&S2 and intact distal pulses. · Pulmonary/Chest: Bilateral respiratory sounds. No wheezes. No rhonchi. · Abdominal: Soft. Bowel sounds present. No distension, No tenderness. · Musculoskeletal: No tenderness. No edema    · Lymphadenopathy: Has no cervical adenopathy. · Neurological: Alert and oriented. Cranial nerve appears intact, No Gross deficit   · Skin: Skin is warm and dry. No rash noted. · Psychiatric: Has a normal mood, affect and behavior     Labs:  Reviewed. ECG: reviewed, Sinus  bradycardia with v-rate of 50 bpm with QRS duration 106 ms. No ventricular pre-excitation, or QT prolongation. Studies:   1. Event monitor:       2. Echo: 3/15/2022  Summary  Left ventricular cavity size is normal.  Normal left ventricular wall thickness. Ejection fraction is severely visually estimated to be 20%. Regional wall motion abnormalities are noted. Indeterminate diastolic function. The left atrium is moderately dilated. Normal right ventricular size. Right ventricular systolic function is moderately-severely reduced. Mild tricuspid regurgitation. .  Estimated pulmonary artery systolic pressure is normal at 32 mmHg assuming a right atrial pressure of 8 mmHg. Echo 2/9/2018:  Left ventricular size is mildly increased. Normal left ventricular wall thickness. Left ventricular function is normal with ejection fraction estimated at 55-60 %. There is reversal of E/A inflow velocities across the mitral valve. Mild thickening of anterior leaflet of mitral valve. Trivial mitral regurgitation is present. Trivial tricuspid regurgitation. Trivial pulmonic regurgitation present. 3. Stress Test:  2/9/2018 Stress Myoview:   Baseline ECG makes stress test difficult to interpret but no clear ischemic   ECG changes were seen   frequent PVC developing during the exercise phase   Poor exercise capacity    4. Cath: [facility]/14/[facility]     LEFT VENTRICULOGRAM:  Reveals a normal left ventricular systolic function with an estimated EF of 50% to 55%.       OVERALL IMPRESSION:    1.  Patent, nondominant right coronary artery.    2.  Patent left main trunk.    3.  Patent left anterior descending artery and its branches.    4.  Patent dominant circumflex artery and its branches.    5.  Normal left ventricular systolic function.    6.  Patent right femoral, common femoral artery.          I independently reviewed the ECG, MCOT, echocardiogram, stress test, and coronary angiography/PCI results and used them for my plan of care. Procedures:  1. Unsuccessful attempt x3 at synchronized cardioversion from atrial  fibrillation with rapid ventricular response to a normal sinus rhythm with Dr. Shiva Mae MD on 3/28/2022.    2. Successful external DC cardioversion of atrial fibrillation with Dr. Gallito Aldana MD on 4/14/2022. Assessment/Plan:     Atrial fibrillation   - s/p DIANA and DCCV x3 (unsuccessfully). - Continue amiodarone 200 mg daily, with routine checking of liver and thyroid function Q4-6 months, yearly PFTs and ophthalmology.      TSH    AST ALT   -He has a [id number] Score 4 (CHF, HTN, AGE,CAD)    -Continue Eliquis 5 mg BID for  thromboembolic risk reduction.   -Tolerating well no signs or symptoms of abnormal bruising or bleeding.   - Continue metoprolol succinate 50 mg daily for rate control of atrial fibrillation and HF.   -We discussed treatment options including antiarrhythmics, rate control with anticoagulation, and ablation. Mr. Jesse Lanes has clear improvement of heart failure symptoms while maintaining NSR on amiodarone and beta blockers. We discussed treatment for atrial fibrillation including medications and ablation. We had this discussion in the hospital as well. He still opts for ablation given that its the best treatment to maintain sinus rhythm. We discussed risks and benefits. His son who has atrial fibrillation was in the room and he's scheduled for afib ablation as well. Will schedule afib ablation without DIANA if in NSR. Continue amiodarone for 3 months post ablation. Today will cut down metoprolol to 25 mg bid given bradycardia and some fatigue. Repeat TTE in 3 months after ablation. We discussed progressive nature of atrial fibrillation. Treatment success decreases when AF becomes persistent and last more than 6 months.      Antiarrhythmic therapy, side effects, benefits and alternative discussed.                  Atrial fibrillation ablation procedure was discussed.  We discussed the need for repeat procedure. On average patients may need more than one ablation procedure.       Risks associated with ablation include but not limited to allergic reaction to the medications, pain, bleeding, infection, nerve injury, injury to diaphragm(breathing muscle), pulmonary embolus(blood clot in lungs), deep vein blood clot, pneumothorax, hemothorax, acute renal failure, cardiac perforation,  tamponade, need for emergent surgery (open heart), permanent pacemaker, pulmonary vein stenosis, left atrial to esophageal fistula, stroke, myocardial infarction and death. Difference between atrial fibrillation and atrial flutter discussed and treatment discussed. Chronic systolic heart failure  HFrEF - severely depressed LV systolic function. -NYHA class III   -LVEF <20 % per Echo 3/15/2022              - depressed systolic function presumed to be rate related. Last normal LV function 2/2018. Last normal angiogram 2/2015.     -Continue guideline directed medical therapy. Beta Blocker: decrease metoprolol succinate 25 mg BID. Diuretic: Torsemide 20 mg daily,               ACE/ARB/ ARNI: Lisinopril 5 mg daily  Euvolemic.              - repeat TTE in 90 days, if LVEF still <35% then Cor angiogram (last normal was 7 years ago). If NICM then ICD for primary prevention.      Follow up 1 month after procedure. Thank you for allowing me to participate in the care of Otilia Cannon. All questions and concerns were addressed to the patient/family. Alternatives to my treatment were discussed. This note was scribed in the presence of Dr Maruice Adams, by Nisa Hawkins RN    Physician attestation: The scribe's documentation has been prepared under my direction and has been personally reviewed by me in its entirety. I confirm that the note above reflects all work, treatment, procedures, and medical decision making performed by me.      Charisma Heller MD  Cardiac Electrophysiology  [facility], [place], operated by [facility]

## 2022-06-07 NOTE — TELEPHONE ENCOUNTER
Received message from [facility] \" I ran his [employer] and it said it was termed. It is showing Medicare A & B however under the Response from Medicare it says Medicare is 2nd, Working Aged. It lists [place] as the primary insurance but patient isn't coming up in [facility] and showed E-Rejected in our system. Can you please contact patient to see what his current insurance situation is? \"      LVM for pt to return my call.

## 2022-06-07 NOTE — TELEPHONE ENCOUNTER
Janet Grey called back and stated the only insurance he has is Medicare. I updated Guillermina Zarate.

## 2022-06-15 DIAGNOSIS — I48.0 PAROXYSMAL ATRIAL FIBRILLATION (HCC): ICD-10-CM

## 2022-06-15 LAB
ANION GAP SERPL CALCULATED.3IONS-SCNC: 19 MMOL/L (ref 3–16)
BUN BLDV-MCNC: 29 MG/DL (ref 7–20)
CALCIUM SERPL-MCNC: 9.9 MG/DL (ref 8.3–10.6)
CHLORIDE BLD-SCNC: 103 MMOL/L (ref 99–110)
CO2: 20 MMOL/L (ref 21–32)
CREAT SERPL-MCNC: 2.1 MG/DL (ref 0.8–1.3)
GFR AFRICAN AMERICAN: 38
GFR NON-AFRICAN AMERICAN: 32
GLUCOSE BLD-MCNC: 90 MG/DL (ref 70–99)
HCT VFR BLD CALC: 51.6 % (ref 40.5–52.5)
HEMOGLOBIN: 17 G/DL (ref 13.5–17.5)
MCH RBC QN AUTO: 32.7 PG (ref 26–34)
MCHC RBC AUTO-ENTMCNC: 33 G/DL (ref 31–36)
MCV RBC AUTO: 99.1 FL (ref 80–100)
PDW BLD-RTO: 14.7 % (ref 12.4–15.4)
PLATELET # BLD: 282 K/UL (ref 135–450)
PMV BLD AUTO: 9.9 FL (ref 5–10.5)
POTASSIUM SERPL-SCNC: 4.4 MMOL/L (ref 3.5–5.1)
RBC # BLD: 5.21 M/UL (ref 4.2–5.9)
SODIUM BLD-SCNC: 142 MMOL/L (ref 136–145)
WBC # BLD: 9.4 K/UL (ref 4–11)

## 2022-06-20 ENCOUNTER — ANESTHESIA EVENT (OUTPATIENT)
Dept: CARDIAC CATH/INVASIVE PROCEDURES | Age: 67
End: 2022-06-20

## 2022-06-20 ENCOUNTER — HOSPITAL ENCOUNTER (OUTPATIENT)
Dept: CARDIAC CATH/INVASIVE PROCEDURES | Age: 67
Discharge: HOME | End: 2022-06-20
Payer: MEDICARE

## 2022-06-20 ENCOUNTER — ANESTHESIA (OUTPATIENT)
Dept: CARDIAC CATH/INVASIVE PROCEDURES | Age: 67
End: 2022-06-20

## 2022-06-20 VITALS
RESPIRATION RATE: 14 BRPM | HEIGHT: 68 IN | OXYGEN SATURATION: 92 % | BODY MASS INDEX: 28.64 KG/M2 | HEART RATE: 62 BPM | DIASTOLIC BLOOD PRESSURE: 77 MMHG | WEIGHT: 189 LBS | TEMPERATURE: 96.8 F | SYSTOLIC BLOOD PRESSURE: 128 MMHG

## 2022-06-20 LAB
ABO/RH: NORMAL
ANTIBODY SCREEN: NORMAL
EKG ATRIAL RATE: 61 BPM
EKG ATRIAL RATE: 62 BPM
EKG DIAGNOSIS: NORMAL
EKG DIAGNOSIS: NORMAL
EKG P AXIS: 78 DEGREES
EKG P-R INTERVAL: 166 MS
EKG P-R INTERVAL: 174 MS
EKG Q-T INTERVAL: 476 MS
EKG Q-T INTERVAL: 590 MS
EKG QRS DURATION: 100 MS
EKG QRS DURATION: 92 MS
EKG QTC CALCULATION (BAZETT): 483 MS
EKG QTC CALCULATION (BAZETT): 593 MS
EKG R AXIS: -37 DEGREES
EKG R AXIS: -40 DEGREES
EKG T AXIS: 62 DEGREES
EKG T AXIS: 88 DEGREES
EKG VENTRICULAR RATE: 61 BPM
EKG VENTRICULAR RATE: 62 BPM
POC ACT LR: 253 SEC
POC ACT LR: 269 SEC
POC ACT LR: 305 SEC
POC ACT LR: 308 SEC
POC ACT LR: 316 SEC
POC ACT LR: 363 SEC

## 2022-06-20 PROCEDURE — C1769 GUIDE WIRE: HCPCS

## 2022-06-20 PROCEDURE — 2709999900 HC NON-CHARGEABLE SUPPLY

## 2022-06-20 PROCEDURE — 93655 ICAR CATH ABLTJ DSCRT ARRHYT: CPT

## 2022-06-20 PROCEDURE — 6360000002 HC RX W HCPCS: Performed by: NURSE ANESTHETIST, CERTIFIED REGISTERED

## 2022-06-20 PROCEDURE — 93656 COMPRE EP EVAL ABLTJ ATR FIB: CPT

## 2022-06-20 PROCEDURE — 7100000001 HC PACU RECOVERY - ADDTL 15 MIN

## 2022-06-20 PROCEDURE — 86901 BLOOD TYPING SEROLOGIC RH(D): CPT

## 2022-06-20 PROCEDURE — C1759 CATH, INTRA ECHOCARDIOGRAPHY: HCPCS

## 2022-06-20 PROCEDURE — 93656 COMPRE EP EVAL ABLTJ ATR FIB: CPT | Performed by: INTERNAL MEDICINE

## 2022-06-20 PROCEDURE — C1730 CATH, EP, 19 OR FEW ELECT: HCPCS

## 2022-06-20 PROCEDURE — 85347 COAGULATION TIME ACTIVATED: CPT

## 2022-06-20 PROCEDURE — 93010 ELECTROCARDIOGRAM REPORT: CPT | Performed by: INTERNAL MEDICINE

## 2022-06-20 PROCEDURE — C1766 INTRO/SHEATH,STRBLE,NON-PEEL: HCPCS

## 2022-06-20 PROCEDURE — 6360000002 HC RX W HCPCS

## 2022-06-20 PROCEDURE — A4216 STERILE WATER/SALINE, 10 ML: HCPCS

## 2022-06-20 PROCEDURE — 93622 COMP EP EVAL L VENTR PAC&REC: CPT | Performed by: INTERNAL MEDICINE

## 2022-06-20 PROCEDURE — 3700000001 HC ADD 15 MINUTES (ANESTHESIA)

## 2022-06-20 PROCEDURE — 2500000003 HC RX 250 WO HCPCS

## 2022-06-20 PROCEDURE — 93623 PRGRMD STIMJ&PACG IV RX NFS: CPT

## 2022-06-20 PROCEDURE — 2580000003 HC RX 258

## 2022-06-20 PROCEDURE — 2580000003 HC RX 258: Performed by: INTERNAL MEDICINE

## 2022-06-20 PROCEDURE — C1732 CATH, EP, DIAG/ABL, 3D/VECT: HCPCS

## 2022-06-20 PROCEDURE — 93657 TX L/R ATRIAL FIB ADDL: CPT

## 2022-06-20 PROCEDURE — 7100000000 HC PACU RECOVERY - FIRST 15 MIN

## 2022-06-20 PROCEDURE — 86850 RBC ANTIBODY SCREEN: CPT

## 2022-06-20 PROCEDURE — 93622 COMP EP EVAL L VENTR PAC&REC: CPT

## 2022-06-20 PROCEDURE — 2500000003 HC RX 250 WO HCPCS: Performed by: INTERNAL MEDICINE

## 2022-06-20 PROCEDURE — 93623 PRGRMD STIMJ&PACG IV RX NFS: CPT | Performed by: INTERNAL MEDICINE

## 2022-06-20 PROCEDURE — 86900 BLOOD TYPING SEROLOGIC ABO: CPT

## 2022-06-20 PROCEDURE — 3700000000 HC ANESTHESIA ATTENDED CARE

## 2022-06-20 PROCEDURE — 93655 ICAR CATH ABLTJ DSCRT ARRHYT: CPT | Performed by: INTERNAL MEDICINE

## 2022-06-20 PROCEDURE — C1894 INTRO/SHEATH, NON-LASER: HCPCS

## 2022-06-20 PROCEDURE — C1893 INTRO/SHEATH, FIXED,NON-PEEL: HCPCS

## 2022-06-20 PROCEDURE — 93657 TX L/R ATRIAL FIB ADDL: CPT | Performed by: INTERNAL MEDICINE

## 2022-06-20 PROCEDURE — 93005 ELECTROCARDIOGRAM TRACING: CPT | Performed by: INTERNAL MEDICINE

## 2022-06-20 PROCEDURE — C1760 CLOSURE DEV, VASC: HCPCS

## 2022-06-20 RX ORDER — OXYCODONE HYDROCHLORIDE 5 MG/1
5 TABLET ORAL PRN
Status: ACTIVE | OUTPATIENT
Start: 2022-06-20 | End: 2022-06-20

## 2022-06-20 RX ORDER — PROTAMINE SULFATE 10 MG/ML
INJECTION, SOLUTION INTRAVENOUS PRN
Status: DISCONTINUED | OUTPATIENT
Start: 2022-06-20 | End: 2022-06-20 | Stop reason: SDUPTHER

## 2022-06-20 RX ORDER — VECURONIUM BROMIDE 1 MG/ML
INJECTION, POWDER, LYOPHILIZED, FOR SOLUTION INTRAVENOUS PRN
Status: DISCONTINUED | OUTPATIENT
Start: 2022-06-20 | End: 2022-06-20 | Stop reason: SDUPTHER

## 2022-06-20 RX ORDER — DEXAMETHASONE SODIUM PHOSPHATE 4 MG/ML
INJECTION, SOLUTION INTRA-ARTICULAR; INTRALESIONAL; INTRAMUSCULAR; INTRAVENOUS; SOFT TISSUE PRN
Status: DISCONTINUED | OUTPATIENT
Start: 2022-06-20 | End: 2022-06-20 | Stop reason: SDUPTHER

## 2022-06-20 RX ORDER — PROPOFOL 10 MG/ML
INJECTION, EMULSION INTRAVENOUS PRN
Status: DISCONTINUED | OUTPATIENT
Start: 2022-06-20 | End: 2022-06-20 | Stop reason: SDUPTHER

## 2022-06-20 RX ORDER — ONDANSETRON 2 MG/ML
4 INJECTION INTRAMUSCULAR; INTRAVENOUS
Status: ACTIVE | OUTPATIENT
Start: 2022-06-20 | End: 2022-06-20

## 2022-06-20 RX ORDER — MIDAZOLAM HYDROCHLORIDE 1 MG/ML
INJECTION INTRAMUSCULAR; INTRAVENOUS PRN
Status: DISCONTINUED | OUTPATIENT
Start: 2022-06-20 | End: 2022-06-20 | Stop reason: SDUPTHER

## 2022-06-20 RX ORDER — FENTANYL CITRATE 50 UG/ML
50 INJECTION, SOLUTION INTRAMUSCULAR; INTRAVENOUS EVERY 5 MIN PRN
Status: DISCONTINUED | OUTPATIENT
Start: 2022-06-20 | End: 2022-06-21 | Stop reason: HOSPADM

## 2022-06-20 RX ORDER — SODIUM CHLORIDE 9 MG/ML
INJECTION, SOLUTION INTRAVENOUS PRN
Status: DISCONTINUED | OUTPATIENT
Start: 2022-06-20 | End: 2022-06-20

## 2022-06-20 RX ORDER — HEPARIN SODIUM 1000 [USP'U]/ML
INJECTION, SOLUTION INTRAVENOUS; SUBCUTANEOUS PRN
Status: DISCONTINUED | OUTPATIENT
Start: 2022-06-20 | End: 2022-06-20 | Stop reason: SDUPTHER

## 2022-06-20 RX ORDER — SODIUM CHLORIDE 0.9 % (FLUSH) 0.9 %
5-40 SYRINGE (ML) INJECTION PRN
Status: DISCONTINUED | OUTPATIENT
Start: 2022-06-20 | End: 2022-06-21 | Stop reason: HOSPADM

## 2022-06-20 RX ORDER — EPHEDRINE SULFATE/0.9% NACL/PF 50 MG/5 ML
SYRINGE (ML) INTRAVENOUS PRN
Status: DISCONTINUED | OUTPATIENT
Start: 2022-06-20 | End: 2022-06-20 | Stop reason: SDUPTHER

## 2022-06-20 RX ORDER — ACETAMINOPHEN 325 MG/1
650 TABLET ORAL EVERY 4 HOURS PRN
Status: DISCONTINUED | OUTPATIENT
Start: 2022-06-20 | End: 2022-06-21 | Stop reason: HOSPADM

## 2022-06-20 RX ORDER — PHENYLEPHRINE HCL IN 0.9% NACL 1 MG/10 ML
SYRINGE (ML) INTRAVENOUS PRN
Status: DISCONTINUED | OUTPATIENT
Start: 2022-06-20 | End: 2022-06-20 | Stop reason: SDUPTHER

## 2022-06-20 RX ORDER — SODIUM CHLORIDE 9 MG/ML
INJECTION, SOLUTION INTRAVENOUS PRN
Status: DISCONTINUED | OUTPATIENT
Start: 2022-06-20 | End: 2022-06-21 | Stop reason: HOSPADM

## 2022-06-20 RX ORDER — FENTANYL CITRATE 50 UG/ML
25 INJECTION, SOLUTION INTRAMUSCULAR; INTRAVENOUS EVERY 5 MIN PRN
Status: DISCONTINUED | OUTPATIENT
Start: 2022-06-20 | End: 2022-06-21 | Stop reason: HOSPADM

## 2022-06-20 RX ORDER — FENTANYL CITRATE 50 UG/ML
INJECTION, SOLUTION INTRAMUSCULAR; INTRAVENOUS PRN
Status: DISCONTINUED | OUTPATIENT
Start: 2022-06-20 | End: 2022-06-20 | Stop reason: SDUPTHER

## 2022-06-20 RX ORDER — SODIUM CHLORIDE 0.9 % (FLUSH) 0.9 %
5-40 SYRINGE (ML) INJECTION EVERY 12 HOURS SCHEDULED
Status: DISCONTINUED | OUTPATIENT
Start: 2022-06-20 | End: 2022-06-20

## 2022-06-20 RX ORDER — SODIUM CHLORIDE 0.9 % (FLUSH) 0.9 %
5-40 SYRINGE (ML) INJECTION PRN
Status: DISCONTINUED | OUTPATIENT
Start: 2022-06-20 | End: 2022-06-20

## 2022-06-20 RX ORDER — SODIUM CHLORIDE 0.9 % (FLUSH) 0.9 %
5-40 SYRINGE (ML) INJECTION EVERY 12 HOURS SCHEDULED
Status: DISCONTINUED | OUTPATIENT
Start: 2022-06-20 | End: 2022-06-21 | Stop reason: HOSPADM

## 2022-06-20 RX ORDER — SODIUM CHLORIDE 9 MG/ML
INJECTION INTRAVENOUS PRN
Status: DISCONTINUED | OUTPATIENT
Start: 2022-06-20 | End: 2022-06-20 | Stop reason: SDUPTHER

## 2022-06-20 RX ORDER — SUCCINYLCHOLINE/SOD CL,ISO/PF 200MG/10ML
SYRINGE (ML) INTRAVENOUS PRN
Status: DISCONTINUED | OUTPATIENT
Start: 2022-06-20 | End: 2022-06-20 | Stop reason: SDUPTHER

## 2022-06-20 RX ORDER — ONDANSETRON 2 MG/ML
INJECTION INTRAMUSCULAR; INTRAVENOUS PRN
Status: DISCONTINUED | OUTPATIENT
Start: 2022-06-20 | End: 2022-06-20 | Stop reason: SDUPTHER

## 2022-06-20 RX ORDER — OXYCODONE HYDROCHLORIDE 10 MG/1
10 TABLET ORAL PRN
Status: ACTIVE | OUTPATIENT
Start: 2022-06-20 | End: 2022-06-20

## 2022-06-20 RX ADMIN — SODIUM CHLORIDE: 9 INJECTION, SOLUTION INTRAVENOUS at 11:43

## 2022-06-20 RX ADMIN — PROTAMINE SULFATE 40 MG: 10 INJECTION, SOLUTION INTRAVENOUS at 14:15

## 2022-06-20 RX ADMIN — FENTANYL CITRATE 50 MCG: 50 INJECTION INTRAMUSCULAR; INTRAVENOUS at 11:49

## 2022-06-20 RX ADMIN — ONDANSETRON 4 MG: 2 INJECTION INTRAMUSCULAR; INTRAVENOUS at 13:18

## 2022-06-20 RX ADMIN — FENTANYL CITRATE 50 MCG: 50 INJECTION INTRAMUSCULAR; INTRAVENOUS at 12:02

## 2022-06-20 RX ADMIN — Medication 10 MG: at 13:58

## 2022-06-20 RX ADMIN — HEPARIN SODIUM 2000 UNITS: 1000 INJECTION INTRAVENOUS; SUBCUTANEOUS at 13:34

## 2022-06-20 RX ADMIN — Medication 100 MCG: at 12:13

## 2022-06-20 RX ADMIN — HEPARIN SODIUM 5000 UNITS: 1000 INJECTION INTRAVENOUS; SUBCUTANEOUS at 12:51

## 2022-06-20 RX ADMIN — Medication 100 MCG: at 12:38

## 2022-06-20 RX ADMIN — Medication 140 MG: at 11:49

## 2022-06-20 RX ADMIN — Medication 100 MCG: at 12:24

## 2022-06-20 RX ADMIN — PROPOFOL 50 MG: 10 INJECTION, EMULSION INTRAVENOUS at 11:49

## 2022-06-20 RX ADMIN — DEXAMETHASONE SODIUM PHOSPHATE 10 MG: 4 INJECTION, SOLUTION INTRAMUSCULAR; INTRAVENOUS at 12:02

## 2022-06-20 RX ADMIN — PROPOFOL 40 MG: 10 INJECTION, EMULSION INTRAVENOUS at 11:52

## 2022-06-20 RX ADMIN — VECURONIUM BROMIDE 10 MG: 1 INJECTION, POWDER, LYOPHILIZED, FOR SOLUTION INTRAVENOUS at 11:56

## 2022-06-20 RX ADMIN — HEPARIN SODIUM 5000 UNITS: 1000 INJECTION INTRAVENOUS; SUBCUTANEOUS at 12:27

## 2022-06-20 RX ADMIN — Medication 10 MG: at 13:02

## 2022-06-20 RX ADMIN — HEPARIN SODIUM 3000 UNITS: 1000 INJECTION INTRAVENOUS; SUBCUTANEOUS at 13:10

## 2022-06-20 RX ADMIN — Medication 10 MG: at 13:44

## 2022-06-20 RX ADMIN — SODIUM CHLORIDE 10 ML: 9 INJECTION, SOLUTION INTRAMUSCULAR; INTRAVENOUS; SUBCUTANEOUS at 11:56

## 2022-06-20 RX ADMIN — MIDAZOLAM 2 MG: 1 INJECTION INTRAMUSCULAR; INTRAVENOUS at 11:43

## 2022-06-20 RX ADMIN — Medication 10 MG: at 12:38

## 2022-06-20 RX ADMIN — Medication 10 MG: at 12:30

## 2022-06-20 RX ADMIN — SODIUM CHLORIDE 4 MCG/MIN: 9 INJECTION, SOLUTION INTRAVENOUS at 13:56

## 2022-06-20 ASSESSMENT — ENCOUNTER SYMPTOMS
SHORTNESS OF BREATH: 1
DYSPNEA ACTIVITY LEVEL: AFTER AMBULATING 1 FLIGHT OF STAIRS

## 2022-06-20 ASSESSMENT — LIFESTYLE VARIABLES: SMOKING_STATUS: 1

## 2022-06-20 NOTE — ANESTHESIA PRE PROCEDURE
Department of Anesthesiology  Preprocedure Note       Name:  Richard Denny   Age:  78 y.o.  :  1955                                          MRN:  [id number]         Date:  2022      Surgeon: * Surgery not found *    Procedure:     Medications prior to admission:   Prior to Admission medications    Medication Sig Start Date End Date Taking?  Authorizing Provider   metoprolol succinate (TOPROL XL) 50 MG extended release tablet Take 0.5 tablets by mouth 2 times daily 22   Ivonne Rodriguez MD   NIFEdipine (PROCARDIA XL) 30 MG extended release tablet Take 1 tablet by mouth daily 22   GUICHO Jones CNP   amiodarone (CORDARONE) 200 MG tablet Take 1 tablet by mouth daily 22   GUICHO Jones CNP   torsemide (DEMADEX) 20 MG tablet Take 1 tablet by mouth daily 3/22/22   April Blackwood MD   apixaban (ELIQUIS) 5 MG TABS tablet Take 1 tablet by mouth 2 times daily 22   Guilherme Ji MD   ezetimibe (ZETIA) 10 MG tablet Take 1 tablet by mouth daily 22   Guilherme Ji MD   omeprazole (PRILOSEC) 40 MG delayed release capsule 1 tab po daily 21   April Blackwood MD   albuterol sulfate HFA (VENTOLIN HFA) 108 (90 Base) MCG/ACT inhaler Inhale 2 puffs into the lungs 4 times daily as needed for Wheezing 21   April Blackwood MD   budesonide-formoterol (SYMBICORT) 160-4.5 MCG/ACT AERO Inhale 2 puffs into the lungs 2 times daily 20   April Blackwood MD       Current medications:    Current Outpatient Medications   Medication Sig Dispense Refill    metoprolol succinate (TOPROL XL) 50 MG extended release tablet Take 0.5 tablets by mouth 2 times daily 30 tablet 3    NIFEdipine (PROCARDIA XL) 30 MG extended release tablet Take 1 tablet by mouth daily 90 tablet 1    amiodarone (CORDARONE) 200 MG tablet Take 1 tablet by mouth daily 30 tablet 2    torsemide (DEMADEX) 20 MG tablet Take 1 tablet by mouth daily 30 tablet 3    apixaban (ELIQUIS) 5 MG TABS tablet Take 1 tablet by mouth 2 times daily 60 tablet 5    ezetimibe (ZETIA) 10 MG tablet Take 1 tablet by mouth daily 30 tablet 6    omeprazole (PRILOSEC) 40 MG delayed release capsule 1 tab po daily 90 capsule 3    albuterol sulfate HFA (VENTOLIN HFA) 108 (90 Base) MCG/ACT inhaler Inhale 2 puffs into the lungs 4 times daily as needed for Wheezing 1 each 2    budesonide-formoterol (SYMBICORT) 160-4.5 MCG/ACT AERO Inhale 2 puffs into the lungs 2 times daily 3 Inhaler 5     No current facility-administered medications for this encounter. Allergies:     Allergies   Allergen Reactions    Atorvastatin-Coenzyme Q10 Diarrhea    Lodine [Etodolac]     Neurontin [Gabapentin]      edema    Pregabalin Swelling    Vioxx Diarrhea    Wellbutrin [Bupropion Hcl]        Problem List:    Patient Active Problem List   Diagnosis Code    GERD (gastroesophageal reflux disease) K21.9    Dysthymic disorder F34.1    Neuropathy G62.9    Nontraumatic rupture of tendons of biceps (long head) M66.329    Sprain and strain of unspecified site of elbow and forearm [id number]    LORIE (generalized anxiety disorder) F41.1    Diverticulitis K57.92    Pernicious anemia D51.0    Chronic obstructive pulmonary disease (HCC) J44.9    Tobacco abuse Z72.0    Chronic pain syndrome G89.4    Lung nodule R91.1    Sciatica M54.30    Numbness and tingling of both legs R20.0, R20.2    Neuropathy, peroneal nerve G57.30    DDD (degenerative disc disease), lumbar M51.36    Lumbar radiculitis M54.16    Insomnia G47.00    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.10    Foraminal stenosis of lumbar region M48.061    Back pain, chronic M54.9, G89.29    Need for prophylactic vaccination and inoculation against influenza Z23    Acute gouty arthropathy M10.9    Knee arthropathy M17.10    Edema R60.9    Cervical paraspinal muscle spasm M62.838    Pleurisy R09.1    HLD (hyperlipidemia) E78.5    COPD exacerbation ([address] J44.1    Chest pain R07.9    Chest pain R07.9    Hypokalemia E87.6    Abnormal nuclear stress test R94.39    Uncontrolled hypertension I10    Atrial fibrillation ([facility]) I48.91    Erythrocytosis D75.1    Encounter for smoking cessation counseling Z71.6    Paroxysmal atrial fibrillation with rapid ventricular response (HCC) I48.0    Atrial fibrillation with RVR ([facility]) I48.91    Cardiomegaly I51.7    Hypomagnesemia E83.42       Past Medical History:        Diagnosis Date    Chronic pain syndrome     Chronic pain syndrome     COPD (chronic obstructive pulmonary disease) ([facility]) 6/5/2012    DDD (degenerative disc disease), lumbar     Depression 6/5/2012    Diverticulitis 6/5/2012    DJD (degenerative joint disease) of knee     Foraminal stenosis of lumbar region     LORIE (generalized anxiety disorder) 6/5/2012    GERD (gastroesophageal reflux disease) 6/5/2012    HTN (hypertension) 6/5/2012    Incidental lung nodule     Insomnia     Lumbar radiculitis     Neuropathy 6/5/2012    Nontraumatic rupture of tendons of biceps (long head) 6/5/2012    Numbness and tingling of both legs     Pernicious anemia 6/5/2012    Sciatica     Sprain and strain of unspecified site of elbow and forearm 6/5/2012       Past Surgical History:        Procedure Laterality Date    CAPSULOTOMY, HAND  04/14/2022    MUSCLE REPAIR Bilateral 8-10 years ago    Pulled bilateral biceps off bone from lifting weights. Social History:    Social History     Tobacco Use    Smoking status: Current Every Day Smoker     Packs/day: 1.50     Years: 50.00     Pack years: 75.00     Types: Cigarettes    Smokeless tobacco: Never Used    Tobacco comment: pt smokes 1 pk and a half a day    Substance Use Topics    Alcohol use: No     Comment: no longer drinks                                Ready to quit: Not Answered  Counseling given: Not Answered  Comment: pt smokes 1 pk and a half a day       Vital Signs (Current):  There were no vitals filed for this visit. BP Readings from Last 3 Encounters:   06/01/22 (!) 142/84   05/04/22 (!) 162/80   04/14/22 (!) 142/104       NPO Status:                                                                                 BMI:   Wt Readings from Last 3 Encounters:   06/01/22 189 lb 9.6 oz (86 kg)   05/04/22 189 lb (85.7 kg)   04/14/22 202 lb (91.6 kg)     There is no height or weight on file to calculate BMI.    CBC:   Lab Results   Component Value Date    WBC 9.4 06/15/2022    RBC 5.21 06/15/2022    HGB 17.0 06/15/2022    HCT 51.6 06/15/2022    MCV 99.1 06/15/2022    RDW 14.7 06/15/2022     06/15/2022       CMP:   Lab Results   Component Value Date     06/15/2022    K 4.4 06/15/2022    K 2.8 03/15/2022     06/15/2022    CO2 20 06/15/2022    BUN 29 06/15/2022    CREATININE 2.1 06/15/2022    GFRAA 38 06/15/2022    GFRAA >60 06/11/2013    AGRATIO 1.3 03/25/2022    LABGLOM 32 06/15/2022    GLUCOSE 90 06/15/2022    PROT 6.6 03/25/2022    PROT 7.1 08/15/2012    CALCIUM 9.9 06/15/2022    BILITOT <0.2 03/25/2022    ALKPHOS 81 03/25/2022    AST 24 03/25/2022    ALT 42 03/25/2022       POC Tests: No results for input(s): POCGLU, POCNA, POCK, POCCL, POCBUN, POCHEMO, POCHCT in the last 72 hours.     Coags:   Lab Results   Component Value Date    PROTIME 10.7 06/12/2019    INR 0.94 06/12/2019    APTT 30.3 06/12/2019       HCG (If Applicable): No results found for: PREGTESTUR, PREGSERUM, HCG, HCGQUANT     ABGs: No results found for: PHART, PO2ART, XFK5OCU, BTN7VYS, BEART, M5DBGRCL     Type & Screen (If Applicable):  No results found for: LABABO, LABRH    Drug/Infectious Status (If Applicable):  No results found for: HIV, HEPCAB    COVID-19 Screening (If Applicable):   Lab Results   Component Value Date    COVID19 Not Detected 03/18/2022           Anesthesia Evaluation  Patient summary reviewed and Nursing notes reviewed no history of anesthetic complications:   Airway: Mallampati: II  TM distance: >3 FB   Neck ROM: full  Mouth opening: > = 3 FB   Dental:    (+) upper dentures      Pulmonary:   (+) COPD:  shortness of breath:  current smoker    (-) pneumonia, asthma, recent URI and sleep apnea          Patient smoked on day of surgery. ROS comment: 75 pack year smoking history   Cardiovascular:  Exercise tolerance: poor (<4 METS),   (+) hypertension:, dysrhythmias: atrial fibrillation, CHF:, MONTERO: after ambulating 1 flight of stairs,     (-) pacemaker, valvular problems/murmurs, past MI, CAD, no pulmonary hypertension and no hyperlipidemia      Rhythm: regular                   ROS comment: March 2022:  Normal left ventricular size and wall thickness with severely reduced   function. Ejection fraction is visually estimated to be <20%. Regional wall motion abnormalities are noted. Ascending aorta has trivial plaque noted. Normal right ventricular size and function. Left atrial appendage, left atrium and left ventricle visualized with no   evidence of thrombus. Ascending aorta has mild atherosclerotic plaque noted. There are no significant valvular abnormalities appreciated ion this study. Neuro/Psych:   (+) neuromuscular disease:, psychiatric history:   (-) seizures, TIA, CVA, headaches and depression/anxiety            GI/Hepatic/Renal:   (+) GERD:,      (-) hepatitis, liver disease, no renal disease, bowel prep and no morbid obesity       Endo/Other:    (+) blood dyscrasia (eliquis)::., no malignancy/cancer. (-) diabetes mellitus, hypothyroidism, hyperthyroidism, arthritis, no electrolyte abnormalities, no malignancy/cancer               Abdominal:             Vascular:     - PVD, DVT and PE. Other Findings: Xanthelasma present          Anesthesia Plan      general     ASA 4     (Continues to smoke)  Induction: intravenous. MIPS: Postoperative opioids intended and Prophylactic antiemetics administered.   Anesthetic plan and risks discussed with patient. Plan discussed with CRNA. Chauncey Younger MD   6/20/2022        This pre-anesthesia assessment may be used as a history and physical.    DOS STAFF ADDENDUM:    Pt seen and examined, chart reviewed (including anesthesia, drug and allergy history). No interval changes to history and physical examination. Anesthetic plan, risks, benefits, alternatives, and personnel involved discussed with patient. Patient verbalized an understanding and agrees to proceed.       Chauncey Younger MD  June 20, 2022  9:52 AM

## 2022-06-20 NOTE — PROGRESS NOTES
Patient to cath recovery. NC Oxygen at 4L weaned to 1L. Pt coughing up. Pt alert and oriented x4. Right groin site clean, dry and intact. No s/s hematoma. Patient able to move all extremities freely. Patient instructed on bedrest times. Eliecer 9. Bedside handoff performed with Sesar Reynoso RN to assume care.     .Electronically signed by Giselle Lares RN on 6/20/2022 at 3:11 PM

## 2022-06-20 NOTE — ANESTHESIA POSTPROCEDURE EVALUATION
Department of Anesthesiology  Postprocedure Note    Patient: Ayla Houston  MRN: [id number]  YOB: 1955  Date of evaluation: 6/20/2022      Procedure Summary     Date: 06/20/22 Room / Location: [facility] Cath Lab; [facility] Echo    Anesthesia Start: 1143 Anesthesia Stop: 1296    Procedure: WST DIANA AFIB ABLATION W ANES Diagnosis:     Scheduled Providers:  Responsible Provider: Juliette Olguin MD    Anesthesia Type: general ASA Status: 4          Anesthesia Type: No value filed.     Eliecer Phase I:      Eliecer Phase II:      Last vitals:   Vitals Value Taken Time     Anesthesia Post Evaluation    Patient location during evaluation: PACU  Level of consciousness: awake and alert  Airway patency: patent  Nausea & Vomiting: no nausea and no vomiting  Complications: no  Cardiovascular status: blood pressure returned to baseline  Respiratory status: acceptable  Hydration status: euvolemic  Comments: Postoperative Anesthesia Note    Name:    Ayla Houston  MRN:      [id number]    Patient Vitals in the past 12 hrs:  06/20/22 1506, SpO2:92 %  06/20/22 1505, BP:128/77, Pulse:62, Resp:14, SpO2:(!) 89 %  06/20/22 1450, BP:120/71, Pulse:62, Resp:14, SpO2:95 %  06/20/22 1445, BP:114/68, Pulse:64, Resp:20, SpO2:93 %  06/20/22 1440, BP:123/72, Temp:96.8 °F (36 °C), Temp src:Temporal, Pulse:64, Resp:20, SpO2:95 %  06/20/22 1040, BP:(!) 154/80, Temp:97.2 °F (36.2 °C), Temp src:Infrared, Pulse:60, Resp:20, SpO2:93 %, Height:5' 8\" (1.727 m), Weight:189 lb (85.7 kg)     LABS:    CBC  Lab Results       Component                Value               Date/Time                  WBC                      9.4                 06/15/2022 11:59 AM        HGB                      17.0                06/15/2022 11:59 AM        HCT                      51.6                06/15/2022 11:59 AM        PLT                      282                 06/15/2022 11:59 AM   RENAL  Lab Results       Component                Value               Date/Time NA                       142                 06/15/2022 11:59 AM        K                        4.4                 06/15/2022 11:59 AM        K                        2.8 (LL)            03/15/2022 06:00 PM        CL                       103                 06/15/2022 11:59 AM        CO2                      20 (L)              06/15/2022 11:59 AM        BUN                      29 (H)              06/15/2022 11:59 AM        CREATININE               2.1 (H)             06/15/2022 11:59 AM        GLUCOSE                  90                  06/15/2022 11:59 AM   COAGS  Lab Results       Component                Value               Date/Time                  PROTIME                  10.7                06/12/2019 12:49 PM        INR                      0.94                06/12/2019 12:49 PM        APTT                     30.3                06/12/2019 12:49 PM     Intake & Output:  @[id number]@    Nausea & Vomiting:  No    Level of Consciousness:  Awake    Pain Assessment:  Adequate analgesia    Anesthesia Complications:  No apparent anesthetic complications    SUMMARY      Vital signs stable  OK to discharge from Stage I post anesthesia care.   Care transferred from Anesthesiology department on discharge from perioperative area

## 2022-06-20 NOTE — PROCEDURES
[facility]     Electrophysiology Procedure Note       Date of Procedure: 6/20/2022  Patient's Name: Chloé Thompson  YOB: 1955   Medical Record Number: [id number]  Referring Physician: Keith Abdalla MD  Procedure Performed by: Sravani Oropeza MD    Procedure performed:  · Electrophysiology study with radiofrequency ablation of atrial fibrillation and pulmonary vein isolation   · Additional ablation of complex fractionated atrial electrograms (for atrial fibrillation) in the anterior wall. · Additional ablation of typical trial flutter. · 3-D electroanatomical mapping of the left atrium    · Transseptal puncture through an intact septum using versacross under intracardiac ultrasound guidance without fluoroscopic guidance   · Intracardiac echocardiography  · Left ventricular pacing and recording  · Drug infusion with an attempt to induce atrial tachydysrhythmia. · External cardioversion of the atrial arrhythmias   · Anesthesia: General anesthesia provided by the Anesthesia service    Indications for procedure: Chloé Thompson is a 78 y.o. male who has a history of persisent atrial fibrillation with NICM presumed to be from the atrial fibrillation and admission for heart failure who responded to amiodarone. He is here for scheduled ablation for atrial fibrillation. Details of Procedure: The risks, benefits and alternatives of the ablation procedure were discussed with the patient. The risks including, but not limited to, the risks of bleeding, infection, radiation exposure, injury to vascular, cardiac and surrounding structures (including pneumothorax), stroke, cardiac perforation, tamponade, need for emergent open heart surgery, need for pacemaker implantation, esophageal injury and fistula, myocardial infarction and death were discussed in detail. The patient opted to proceed with the ablation. Written informed consent was signed and placed in the chart.       Patient was brought to the EP lab in a fasting non-sedate state. Patient underwent general anesthesia by anesthesia team. We initially performed a transesophageal echo that showed no left atrial appendage clot/thrombus. Procedure was done without use of fluoroscopy. Both groins were prepped in a sterile fashion. We gained access in Right femoral vein. A 8-French sheath for ICE and 6F sheath for CS catheter and an agilis sheath for ablation and mapping catheters were  placed in the right femoral vein using modified seldinger technique. Ultrasound was used for femoral venous access. We gained access modified Seldinger technique and ultrasound guidance. The femoral vein was identified with real time visualization of needle passage to the venous lumen. Using 3-D mapping system Carto, the CS catheter was placed inside the coronary sinus  for recording and mapping of the left atrium. Using ICE we delineated the left pulmonary vein, left atrial appendage,  mitral valve, and right superior and right inferior pulmonary veins. Patient received a bolus of heparin prior transseptal puncture followed by continuous monitoring of the ACT and boluses of heparin during the procedure to keep the ACT between 350-400. Also an esophageal temperature probe in addition to Esophastar catheter was advanced into the esophagus for mapping of the esophagus and careful monitoring of the esophageal temperature during ablation. A transseptal puncture through intact septum was done using ICE and  pressure monitoring. Versacross needle inside the baylis sheath was used for the transseptal puncture. The baylis sheath was advanced and left inside the left atrium. Then a pentaray catheter with deflectable curve and an irrigated ablation catheter was advanced into the left atrium sequentially and alternatively as needed.       Using Penta ray and Carto navigation system a three dimensional electro anatomical mapping of synchronized, biphasic shock which successfully converted the patient to sinus rhythm in order to evaluate for exit block across the L and R antrum. While doing EP study, and on isuprel, atrial flutter was induced but with variable TCL. However, the predominant activation was proximal to distal,  ms. Right sided entrainment was close to the TCL suggestive of it being right sided. Right atrial flutter ablation (CTI dependent)   Radiofrequency lesions were delivered in a linear fashion to Cavotricuspid isthmus. The tachycardia eventually terminated while doing lesions close to the IVC. While maintaining pacing from the proximal coronary sinus, additional lesions were delivered to the isthmus until bidirectional block was observed. Bidirectional block was confirmed by noting split potentials along the ablation line (> 100 ms) in addition to trans-isthmus conduction time of >160 ms. Differential pacing from medial and lateral side of the line also confirmed bidirectional block    We then performed an EP study and programmed stimulation using our CS catheter and ablation catheter to assess the cardiac conduction system and to attempt to induce atrial tachydysrhythmia. The ablation catheter were moved from the left atrium to the left ventricular apex and His bundle position.  His bundle potentials was recorded and pacing and recordings were performed from right atrium, coronary sinus and LV apex with the following results:     Sinus cycle length was 1006 msec  VT interval was 186 msec  QRS duration was 91 msec  QT interval was 459 msec  AH interval was 76 msec  HV interval was 55 msec  Pacing from left atrium, 1:1 conduction over AV node with (AV wenckebach) was 500  msec  Pacing from left atrium, AV frank ERP was 600/430 msec   Pacing from LV apex, 1:1 retrograde conduction over AV node (VA wenckebach) was 470 msec    After ablation was complete, catheters were placed in the left and right atrium, His-position, right ventricle, and left ventricle for pacing and recording. Isuprel titrated to at least 25% increase in HR was started. Arrhythmia was attempted to be induced by rapid atrial and ventricular pacing, and there was no induction of atrial tachydysrhythmia. Then both the ablation and CS catheters were removed from the body. ICE was then used to check if there was new or change in pericardial effusion. Trivial to small noted at beginning of case without change in size at the end of the case. ICE catheter was then removed. All the 3 sheaths were removed from the right femoral vein. Since patient was on anticoagulation with heparin with high ACT, we used Perclose device for closure of access sites on the right femoral vein. 40 mg of protamine was given to partially reverse the ACT. The patient tolerated the procedure well and there were no complications. Patient was extubated and transferred to the floor in stable condition. Blood loss <20 cc. No complication     Conclusion:      Pulmonary vein isolations using wide area circumferential radiofrequency ablation with confirmation of exit and entrance block. Successful ablation for typical atrial flutter. Plan:   · The patient will go for recovery and will receive usual post ablation care. · Discharge home later today if still doing well. · Pantoprazole for 4 weeks. · continue current medications. · If has pleuritic chest pain tomorrow, the patient or family member will call us so that we send colchicine prescription. · Follow up with me in 1 month then with [facility], NP in 3 months. Thank you for allowing us to participate in the care of your patient. If you have any questions or concerns, please do not hesitate to contact me.       Rene De Souza MD  Cardiac Electrophysiology  [facility]

## 2022-06-20 NOTE — H&P
H&P Update    I have reviewed the history and physical from 2022 and examined the patient and find no relevant changes. I have reviewed with the patient and/or family the risks, benefits, and alternatives to the procedure. Pre-sedation Assessment    Patient:  Chloé Thompson   :   1955  Intended Procedure: Atrial fibrillation ablation. Nurses notes reviewed and agreed. Medications reviewed  Allergies:    Allergies   Allergen Reactions    Atorvastatin-Coenzyme Q10 Diarrhea    Lodine [Etodolac]     Neurontin [Gabapentin]      edema    Pregabalin Swelling    Vioxx Diarrhea    Wellbutrin [Bupropion Hcl]          Pre-Procedure Assessment/Plan:  ASA 3 - Patient with moderate systemic disease with functional limitations    Level of Sedation Plan:Per anesthesia    Post Procedure plan: Return to same level of care    Sravani Oropeza MD  Cardiac Electrophysiology  [place] [id number]

## 2022-06-22 PROBLEM — I50.22 CHRONIC SYSTOLIC (CONGESTIVE) HEART FAILURE (HCC): Status: ACTIVE | Noted: 2022-06-22

## 2022-06-22 PROBLEM — N18.30 CHRONIC RENAL DISEASE, STAGE III (HCC): Status: ACTIVE | Noted: 2022-06-22

## 2022-06-22 NOTE — FLOWSHEET NOTE
Called patient for follow up from Afib ablation, pt denies any pain, states groin site is clean and intact with no bleeding. No other issues. Told him to call if any issues arise.

## 2022-07-18 NOTE — PROGRESS NOTES
Cardiac Electrophysiology Consultation   Date: 7/20/2022   Reason for Consultation: Atrial fibrillation  Consult Requesting Physician: Shania Trammell MD  Primary Care Physician: Yoly Pizano MD     Chief Complaint:   Chief Complaint   Patient presents with    Follow-up     cardiomyopathy           HPI: Gabby Farias is a 78 y.o. patient with a history of smoker (1.5 PPD) COPD, HTN, and paroxysmal atrial fibrillation. He was first diagnosed with atrial fibrillation during the recent hospitalization at [facility]. He was hospitalized at [facility] from 3/15-3/21/2022 after presenting to the [facility] ED  reporting symptoms of bilateral lower extremity swelling and shortness of breath and EKG showed atrial fibrillation with RVR. Echocardiogram showed globally low LVEF. It was felt that he had new onset tachycarida induced cardiomyopathy. He had unsuccessful DCCV x3 in hospital. Amiodarone loading was started and scheduled for outpatient DCCV. He underwent successful cardioversion to NSR with Dr. Shania Trammell MD on 4/14/2022. He was seen in office on  6/1/2022 accompanied by his son for hospital follow up to discuss treatment options for his atrial fibrillation. He reported symptoms of intermittent shortness of breath and occasional swelling in his lower extremities. Ablation was discussed as a treatment option. He decided to proceed and on 6/20/2022 he underwent radiofrequency ablation of atrial fibrillation and pulmonary vein isolation and additional ablation of typical atrial flutter. Interval History: Today, he presents to office for follow up s/p ablation. He states that he has been feeling well. He denies any reoccurrence of the arrhthymias. He states that he still has intermittent shortness of breath which he attributes to his smoking. Otherwise he feels energetic. He is compliant with his medications and tolerating them well.  He denies chest pain/pressure, tightness, edema, heart racing, palpitations, lightheadedness, dizziness, syncope, presyncope,  PND or orthopnea. Past Medical History:   Diagnosis Date    Chronic pain syndrome     Chronic pain syndrome     COPD (chronic obstructive pulmonary disease) ([facility] [address] 6/5/2012    DDD (degenerative disc disease), lumbar     Depression 6/5/2012    Diverticulitis 6/5/2012    DJD (degenerative joint disease) of knee     Foraminal stenosis of lumbar region     LORIE (generalized anxiety disorder) 6/5/2012    GERD (gastroesophageal reflux disease) 6/5/2012    HTN (hypertension) 6/5/2012    Incidental lung nodule     Insomnia     Lumbar radiculitis     Neuropathy 6/5/2012    Nontraumatic rupture of tendons of biceps (long head) 6/5/2012    Numbness and tingling of both legs     Pernicious anemia 6/5/2012    Sciatica     Sprain and strain of unspecified site of elbow and forearm 6/5/2012      Past Surgical History:   Procedure Laterality Date    CARDIOVERSION  04/14/2022    MUSCLE REPAIR Bilateral 8-10 years ago    Pulled bilateral biceps off bone from lifting weights. Allergies: Allergies   Allergen Reactions    Atorvastatin-Coenzyme Q10 Diarrhea    Lodine [Etodolac]     Neurontin [Gabapentin]      edema    Pregabalin Swelling    Vioxx Diarrhea    Wellbutrin [Bupropion Hcl]        Medication:   Prior to Admission medications    Medication Sig Start Date End Date Taking?  Authorizing Provider   sildenafil (VIAGRA) 100 MG tablet Take 1 tablet by mouth daily as needed for Erectile Dysfunction 7/11/22  Yes Valentino Pimenta, MD   metoprolol succinate (TOPROL XL) 50 MG extended release tablet Take 0.5 tablets by mouth in the morning and at bedtime 7/11/22  Yes Valentino Pimenta, MD   amiodarone (CORDARONE) 200 MG tablet TAKE ONE TABLET BY MOUTH DAILY 6/29/22  Yes Valentino Pimenta, MD   torsemide (DEMADEX) 20 MG tablet Take 1 tablet by mouth daily  Patient taking differently: Take 20 mg by mouth as needed 6/22/22  Yes Valentino Pimenta, MD   apixaban (ELIQUIS) 5 MG TABS tablet Take 1 tablet by mouth 2 times daily 6/22/22  Yes Lazarus Rod, MD   ezetimibe (ZETIA) 10 MG tablet Take 1 tablet by mouth daily 6/22/22  Yes Lazarus Rod, MD   omeprazole (PRILOSEC) 40 MG delayed release capsule 1 tab po daily 6/22/22  Yes Lazarus Rod, MD   albuterol sulfate HFA (VENTOLIN HFA) 108 (90 Base) MCG/ACT inhaler Inhale 2 puffs into the lungs 4 times daily as needed for Wheezing 6/22/22  Yes Lazarus Rod, MD   budesonide-formoterol (SYMBICORT) 160-4.5 MCG/ACT AERO Inhale 2 puffs into the lungs 2 times daily 11/23/20  Yes Lazarus Rod, MD       Social History:   reports that he has been smoking cigarettes. He has a 75.00 pack-year smoking history. He has never used smokeless tobacco. He reports current drug use. Drug: Marijuana [place]). He reports that he does not drink alcohol. Family History:  family history includes Arthritis in his father; Heart Disease in his father; Other in his father. Reviewed. Denies family history of sudden cardiac death, arrhythmia, premature CAD    Review of System:  Pertinent positive and negatives are in the HPI, the rest are negative. Physical Examination:  Pulse 61   Ht 5' 8\" (1.727 m)   Wt 190 lb (86.2 kg)   SpO2 95%   BMI 28.89 kg/m²      Constitutional: Oriented. No distress. Head: Normocephalic and atraumatic. Mouth/Throat: Oropharynx is clear and moist.   Eyes: Conjunctivae normal. EOM are normal.   Neck: Normal range of motion. Neck supple. No rigidity. No JVD present. Cardiovascular: Normal rate, regular rhythm, S1&S2 and intact distal pulses. Pulmonary/Chest: Bilateral respiratory sounds. No wheezes. No rhonchi. Abdominal: Soft. Bowel sounds present. No distension, No tenderness. Musculoskeletal: No tenderness. No edema    Lymphadenopathy: Has no cervical adenopathy. Neurological: Alert and oriented. Cranial nerve appears intact, No Gross deficit   Skin: Skin is warm and dry.  No rash noted. Psychiatric: Has a normal mood, affect and behavior     Labs:  Reviewed. ECG: reviewed, Sinus bradycardia occasional PAC, with v-rate of 50 bpm with QRS duration 100 ms. No ventricular pre-excitation, or QT prolongation. Studies:   1. Event monitor: n/a      2. Echo: 3/15/2022  Summary  Left ventricular cavity size is normal.  Normal left ventricular wall thickness. Ejection fraction is severely visually estimated to be 20%. Regional wall motion abnormalities are noted. Indeterminate diastolic function. The left atrium is moderately dilated. Normal right ventricular size. Right ventricular systolic function is moderately-severely reduced. Mild tricuspid regurgitation. .  Estimated pulmonary artery systolic pressure is normal at 32 mmHg assuming a right atrial pressure of 8 mmHg. Echo 2/9/2018:  Left ventricular size is mildly increased. Normal left ventricular wall thickness. Left ventricular function is normal with ejection fraction estimated at 55-60 %. There is reversal of E/A inflow velocities across the mitral valve. Mild thickening of anterior leaflet of mitral valve. Trivial mitral regurgitation is present. Trivial tricuspid regurgitation. Trivial pulmonic regurgitation present. 3. Stress Test:  2/9/2018 Stress Myoview:   Baseline ECG makes stress test difficult to interpret but no clear ischemic   ECG changes were seen   frequent PVC developing during the exercise phase   Poor exercise capacity    4. Cath: 2/14/2015 [facility]     LEFT VENTRICULOGRAM:  Reveals a normal left ventricular systolic function with an estimated EF of 50% to 55%. OVERALL IMPRESSION:    1. Patent, nondominant right coronary artery. 2.  Patent left main trunk. 3.  Patent left anterior descending artery and its branches. 4.  Patent dominant circumflex artery and its branches. 5.  Normal left ventricular systolic function. 6.  Patent right femoral, common femoral artery.           I independently reviewed the ECG, MCOT, echocardiogram, stress test, and coronary angiography/PCI results and used them for my plan of care. Procedures:  1. Unsuccessful attempt x3 at synchronized cardioversion from atrial  fibrillation with rapid ventricular response to a normal sinus rhythm with Dr. Delio Ba MD on 3/28/2022.  2. Successful external DC cardioversion of atrial fibrillation with Dr. Deborah Lopez MD on 4/14/2022.  3. Electrophysiology study with radiofrequency ablation of atrial fibrillation and pulmonary vein isolation and additional ablation of typical trial flutter 6/20/2022. Assessment/Plan:     Atrial fibrillation   - s/p DIANA and DCCV x3 (unsuccessfully). -s/p successful CV to NSR on 4/14/2022.   -s/p RFA of atrial fibrillation and typical atrial flutter on 6/20/2022.     - Continue amiodarone 200 mg daily, with routine checking of liver and thyroid function Q4-6 months, yearly PFTs and ophthalmology. -TSH 1.470 8/25/2021. -AST 24  ALT 42 (H). 3/25/2022     -He has a [id number] Score 4 (CHF, HTN, AGE,CAD)    -Continue Eliquis 5 mg BID for  thromboembolic risk reduction.   -Tolerating well no signs or symptoms of abnormal bruising or bleeding.     - Decrease metoprolol succinate to 25 mg daily due to bradycardia. Mr. Jenifer Holden feels more energetic with no recurrence of arrhythmia. He still has some shortness of breath he is attributing to continued smoking. EKG shows sinus bradycardia with PACs. HR is 50. Will cut down metoprolol in half and will continue amiodarone. Amiodarone can be discontued at the 3 month post ablation visit if still in NSR. Continue anticoagulation. Chronic systolic heart failure  HFrEF - severely depressed LV systolic function. -NYHA class III   -LVEF <20 % per Echo 3/15/2022              - depressed systolic function presumed to be rate related. Last normal LV function 2/2018.  Last normal angiogram 2/2015.     -Continue guideline directed medical therapy. Beta Blocker: Metoprolol succinate 25 mg daily. Diuretic: Torsemide 20 mg daily,               ACE/ARB/ ARNI: Lisinopril 5 mg daily  Euvolemic on today's exam.    Given overall improvement in functionality and HR, Repeat Echo in two months to assess heart systolic function. Primary hypertension   -Elevated in office today 150/82.  -Goal BP < 130/80  -continue medical management. Smoking addiction  -Encouraged smoking cessation. Follow up in two months as scheduled with [facility], CNP. Thank you for allowing me to participate in the care of Alena Tovar. All questions and concerns were addressed to the patient/family. Alternatives to my treatment were discussed. This note was scribed in the presence of Dr Breonna Chirinos, by Tae Fraser RN    Physician attestation: The scribe's documentation has been prepared under my direction and has been personally reviewed by me in its entirety. I confirm that the note above reflects all work, treatment, procedures, and medical decision making performed by me.      Xavier Narvaez MD  Cardiac Electrophysiology  [facility]

## 2022-07-20 ENCOUNTER — OFFICE VISIT (OUTPATIENT)
Dept: CARDIOLOGY CLINIC | Age: 67
End: 2022-07-20
Payer: MEDICARE

## 2022-07-20 VITALS
DIASTOLIC BLOOD PRESSURE: 82 MMHG | BODY MASS INDEX: 28.79 KG/M2 | WEIGHT: 190 LBS | SYSTOLIC BLOOD PRESSURE: 150 MMHG | HEIGHT: 68 IN | HEART RATE: 61 BPM | OXYGEN SATURATION: 95 %

## 2022-07-20 DIAGNOSIS — I50.22 CHRONIC SYSTOLIC HEART FAILURE (HCC): ICD-10-CM

## 2022-07-20 DIAGNOSIS — F17.200 SMOKING ADDICTION: ICD-10-CM

## 2022-07-20 DIAGNOSIS — I42.8 OTHER CARDIOMYOPATHY (HCC): ICD-10-CM

## 2022-07-20 DIAGNOSIS — I48.0 PAROXYSMAL ATRIAL FIBRILLATION (HCC): Primary | ICD-10-CM

## 2022-07-20 PROCEDURE — G8417 CALC BMI ABV UP PARAM F/U: HCPCS | Performed by: INTERNAL MEDICINE

## 2022-07-20 PROCEDURE — 93000 ELECTROCARDIOGRAM COMPLETE: CPT | Performed by: INTERNAL MEDICINE

## 2022-07-20 PROCEDURE — 99215 OFFICE O/P EST HI 40 MIN: CPT | Performed by: INTERNAL MEDICINE

## 2022-07-20 PROCEDURE — 3017F COLORECTAL CA SCREEN DOC REV: CPT | Performed by: INTERNAL MEDICINE

## 2022-07-20 PROCEDURE — 4004F PT TOBACCO SCREEN RCVD TLK: CPT | Performed by: INTERNAL MEDICINE

## 2022-07-20 PROCEDURE — G8427 DOCREV CUR MEDS BY ELIG CLIN: HCPCS | Performed by: INTERNAL MEDICINE

## 2022-07-20 PROCEDURE — 1123F ACP DISCUSS/DSCN MKR DOCD: CPT | Performed by: INTERNAL MEDICINE

## 2022-07-20 RX ORDER — METOPROLOL SUCCINATE 50 MG/1
25 TABLET, EXTENDED RELEASE ORAL EVERY MORNING
Qty: 30 TABLET | Refills: 3
Start: 2022-07-20

## 2022-09-16 ENCOUNTER — HOSPITAL ENCOUNTER (OUTPATIENT)
Dept: NON INVASIVE DIAGNOSTICS | Age: 67
Discharge: HOME | End: 2022-09-16
Payer: MEDICARE

## 2022-09-16 DIAGNOSIS — I48.0 PAROXYSMAL ATRIAL FIBRILLATION (HCC): ICD-10-CM

## 2022-09-16 LAB
LV EF: 58 %
LVEF MODALITY: NORMAL

## 2022-09-16 PROCEDURE — 93306 TTE W/DOPPLER COMPLETE: CPT

## 2022-09-17 NOTE — RESULT ENCOUNTER NOTE
Reviewed  Let the patient know the good news. His LV systolic function has fully recovered with maintaining NSR. Cardiomyopathy was tachycardia mediated.

## 2022-09-19 PROBLEM — I48.19 PERSISTENT ATRIAL FIBRILLATION (HCC): Status: ACTIVE | Noted: 2019-06-12

## 2022-09-19 NOTE — PROGRESS NOTES
[address]   Electrophysiology      Date: 9/20/2022    Primary Cardiologist: Ash Vargas MD  PCP: Edwin Kraft MD     Chief Complaint:   Chief Complaint   Patient presents with    Follow-up     Sob on exertion      History of Present Illness:    I saw Gadiel Christianson in the office for electrophysiology follow up today. He is a 78 y.o. male with a past medical history of smoker (1.5 PPD) COPD, HTN, and paroxysmal atrial fibrillation. He was first diagnosed with atrial fibrillation during the recent hospitalization at [facility]. He was hospitalized at [facility] from 3/15-3/21/2022 after presenting to the [facility] ED  reporting symptoms of bilateral lower extremity swelling and shortness of breath and EKG showed atrial fibrillation with RVR. Echocardiogram showed globally low LVEF. It was felt that he had new onset tachycarida induced cardiomyopathy. He had unsuccessful DCCV x3 in hospital. Amiodarone loading was started and scheduled for outpatient DCCV. He underwent successful cardioversion to NSR with Dr. Ash Vargas MD on 4/14/2022. Later underwent atrial fibrillation and typical atrial flutter ablation with Dr. Milton Marie on 6/20/22. He presents today for follow up. He has been feeling okay since his last visit. He has noticed increased dyspnea on exertion, leg swelling and orthopnea over the last few days. His weight has been relatively stable at home. No chest pain or palpitations. No syncope. No bleeding issues. Allergies: Allergies   Allergen Reactions    Atorvastatin-Coenzyme Q10 Diarrhea    Lodine [Etodolac]     Neurontin [Gabapentin]      edema    Pregabalin Swelling    Vioxx Diarrhea    Wellbutrin [Bupropion Hcl]      Home Medications:  Prior to Visit Medications    Medication Sig Taking?  Authorizing Provider   metoprolol succinate (TOPROL XL) 50 MG extended release tablet Take 0.5 tablets by mouth every morning Yes Neo Leigh MD   sildenafil (VIAGRA) 100 MG tablet Take 1 tablet by mouth daily as needed for Erectile Dysfunction Yes Vern Figueroa MD   torsemide (DEMADEX) 20 MG tablet Take 1 tablet by mouth daily  Patient taking differently: Take 20 mg by mouth as needed Yes Vern Figueroa MD   apixaban (ELIQUIS) 5 MG TABS tablet Take 1 tablet by mouth 2 times daily Yes Vern Figueroa MD   ezetimibe (ZETIA) 10 MG tablet Take 1 tablet by mouth daily Yes Vern Figueroa MD   omeprazole (PRILOSEC) 40 MG delayed release capsule 1 tab po daily Yes Vern Figueroa MD   albuterol sulfate HFA (VENTOLIN HFA) 108 (90 Base) MCG/ACT inhaler Inhale 2 puffs into the lungs 4 times daily as needed for Wheezing Yes Vern Figueroa MD   budesonide-formoterol (SYMBICORT) 160-4.5 MCG/ACT AERO Inhale 2 puffs into the lungs 2 times daily Yes Vern Figueroa MD        Past Medical History:  Past Medical History:   Diagnosis Date    Chronic pain syndrome     Chronic pain syndrome     COPD (chronic obstructive pulmonary disease) ([facility] [address] 6/5/2012    DDD (degenerative disc disease), lumbar     Depression 6/5/2012    Diverticulitis 6/5/2012    DJD (degenerative joint disease) of knee     Foraminal stenosis of lumbar region     LORIE (generalized anxiety disorder) 6/5/2012    GERD (gastroesophageal reflux disease) 6/5/2012    HTN (hypertension) 6/5/2012    Incidental lung nodule     Insomnia     Lumbar radiculitis     Neuropathy 6/5/2012    Nontraumatic rupture of tendons of biceps (long head) 6/5/2012    Numbness and tingling of both legs     Pernicious anemia 6/5/2012    Sciatica     Sprain and strain of unspecified site of elbow and forearm 6/5/2012       Past Surgical History:   Past Surgical History:   Procedure Laterality Date    CARDIOVERSION  04/14/2022    MUSCLE REPAIR Bilateral 8-10 years ago    Pulled bilateral biceps off bone from lifting weights. Social History:   reports that he has been smoking cigarettes. He has a 75.00 pack-year smoking history.  He has never used smokeless tobacco. He reports current drug use. Drug: Marijuana [place]). He reports that he does not drink alcohol. Family History:      Problem Relation Age of Onset    Arthritis Father     Other Father         GERD    Heart Disease Father         had heart attack       Review of Systems   Constitutional:  Negative for chills, fatigue, fever and unexpected weight change. HENT:  Negative for congestion, hearing loss, sinus pressure, sore throat and trouble swallowing. Respiratory:  Positive for shortness of breath. Negative for cough and wheezing. Cardiovascular:  Positive for leg swelling. Negative for chest pain and palpitations. + orthopnea   Gastrointestinal:  Negative for abdominal pain, blood in stool, constipation, diarrhea, nausea and vomiting. Genitourinary:  Negative for hematuria. Musculoskeletal:  Negative for arthralgias, back pain, gait problem and myalgias. Skin:  Negative for color change, rash and wound. Neurological:  Negative for dizziness, seizures, syncope, speech difficulty, weakness and light-headedness. Hematological:  Does not bruise/bleed easily. Physical Examination:  Vitals:    09/20/22 1034   BP: 132/86   Pulse:    SpO2:       Wt Readings from Last 3 Encounters:   09/20/22 192 lb (87.1 kg)   07/20/22 190 lb (86.2 kg)   07/11/22 192 lb (87.1 kg)       Physical Exam  Vitals reviewed. Constitutional:       General: He is not in acute distress. Appearance: Normal appearance. HENT:      Head: Normocephalic and atraumatic. Nose: Nose normal.      Mouth/Throat:      Mouth: Mucous membranes are moist.   Eyes:      Conjunctiva/sclera: Conjunctivae normal.      Pupils: Pupils are equal, round, and reactive to light. Cardiovascular:      Rate and Rhythm: Normal rate and regular rhythm. Heart sounds: No murmur heard. No friction rub. No gallop. Pulmonary:      Effort: No respiratory distress. Breath sounds: No wheezing, rhonchi or rales. Abdominal:      General: Abdomen is flat. Bowel sounds are normal.      Palpations: Abdomen is soft. Musculoskeletal:         General: Normal range of motion. Right lower leg: Edema (minimal) present. Left lower leg: Edema (minimal) present. Skin:     General: Skin is warm and dry. Findings: No bruising. Neurological:      General: No focal deficit present. Mental Status: He is alert and oriented to person, place, and time. Motor: No weakness. Psychiatric:         Mood and Affect: Mood normal.         Behavior: Behavior normal.        Pertinent labs, diagnostic, device, and imaging results reviewed as a part of this visit    LABS    CBC:   Lab Results   Component Value Date    WBC 9.4 06/15/2022    HGB 17.0 06/15/2022    HCT 51.6 06/15/2022    MCV 99.1 06/15/2022     06/15/2022     BMP:   Lab Results   Component Value Date    CREATININE 2.1 (H) 06/15/2022    BUN 29 (H) 06/15/2022     06/15/2022    K 4.4 06/15/2022     06/15/2022    CO2 20 (L) 06/15/2022     Estimated Creatinine Clearance: 37 mL/min (A) (based on SCr of 2.1 mg/dL (H)). No results found for: BNP    Thyroid:   Lab Results   Component Value Date    TSH 1.470 2021     Lipid Panel:   Lab Results   Component Value Date/Time    CHOL 285 2021 12:00 AM    HDL 44 2021 12:00 AM    TRIG 249 2021 12:00 AM     LFTs:  Lab Results   Component Value Date    ALT 42 (H) 2022    AST 24 2022    ALKPHOS 81 2022    BILITOT <0.2 2022     Coags:   Lab Results   Component Value Date    PROTIME 10.7 2019    INR 0.94 2019    APTT 30.3 2019       EC2022   SB at 59 BPM. PACs. Echo: 22   Normal left ventricle size and systolic function with an estimated ejection   fraction of 55-60%. No regional wall motion abnormalities are seen. There is   mildly increased left ventricular wall thickness.  Diastolic filling   parameters suggest grade II diastolic dysfunction. Normal right ventricular size and function. The left atrium is moderately dilated. Trivial mitral and tricuspid regurgitation. Stress test: 2/9/18   Baseline ECG makes stress test difficult to interpret but no clear ischemic   ECG changes were seen   frequent PVC developing during the exercise phase   Poor exercise capacity    Cath: 5/5/15  LEFT VENTRICULOGRAM:  Reveals a normal left ventricular systolic function  with an estimated EF of 50% to 55%. OVERALL IMPRESSION:    1. Patent, nondominant right coronary artery. 2.  Patent left main trunk. 3.  Patent left anterior descending artery and its branches. 4.  Patent dominant circumflex artery and its branches. 5.  Normal left ventricular systolic function. 6.  Patent right femoral, common femoral artery. Procedures:  1.  Unsuccessful attempt x3 at synchronized cardioversion from atrial  fibrillation with rapid ventricular response to a normal sinus rhythm with Dr. Susana Valles MD on 3/28/2022.  2. Successful external DC cardioversion of atrial fibrillation with Dr. Marybeth Waters MD on 4/14/2022.  3. Electrophysiology study with radiofrequency ablation of atrial fibrillation and pulmonary vein isolation and additional ablation of typical trial flutter 6/20/2022, Dr. Eliseo Mae:    Persistent atrial fibrillation/typical atrial flutter   - A fib first seen on EKG in March 2022   - s/p unsuccessful DCCV 3/22, successful DCCV after starting amiodarone 4/14/22    - s/p A fib and R flutter ablation 6/20/22 with Dr. Ramandeep Larios 3 (age, HTN, HF) on Eliquis 5mg BID   - EKG today with SB   - stop amiodarone   - 30 day monitor in 2-3 months to allow some wash out of amiodarone    Chronic systolic heart failure/non-ischemic cardiomyopathy   - EF as low as <20% 3/22, recovered to 55-60% 9/22 - likely tachycardia-mediated   - continue GDMT   - managed by Dr. Kendra Boudreaux   - appears mildly fluid overloaded, will take torsemide x3 days and then resume PRN dosing    Primary HTN   - controlled   - continue medical therapy    Thank you for allowing to us to participate in the care of Vito Mccracken. Return in about 4 months (around 1/20/2023) for an appointment with Edie Santos NP.      GUICHO Mondragon  [facility], [address] [place] [address]  Phone: [phone]  Fax: [phone]    Electronically signed by GUICHO Mayo - CNP on 9/20/2022 at 11:05 AM

## 2022-09-20 ENCOUNTER — OFFICE VISIT (OUTPATIENT)
Dept: CARDIOLOGY CLINIC | Age: 67
End: 2022-09-20
Payer: MEDICARE

## 2022-09-20 VITALS
SYSTOLIC BLOOD PRESSURE: 132 MMHG | OXYGEN SATURATION: 97 % | BODY MASS INDEX: 29.1 KG/M2 | WEIGHT: 192 LBS | HEIGHT: 68 IN | DIASTOLIC BLOOD PRESSURE: 86 MMHG | HEART RATE: 54 BPM

## 2022-09-20 DIAGNOSIS — I48.3 TYPICAL ATRIAL FLUTTER (HCC): ICD-10-CM

## 2022-09-20 DIAGNOSIS — I50.22 CHRONIC SYSTOLIC HEART FAILURE (HCC): ICD-10-CM

## 2022-09-20 DIAGNOSIS — I10 PRIMARY HYPERTENSION: ICD-10-CM

## 2022-09-20 DIAGNOSIS — I48.19 PERSISTENT ATRIAL FIBRILLATION (HCC): Primary | ICD-10-CM

## 2022-09-20 DIAGNOSIS — I42.8 NONISCHEMIC CARDIOMYOPATHY (HCC): ICD-10-CM

## 2022-09-20 PROCEDURE — G8427 DOCREV CUR MEDS BY ELIG CLIN: HCPCS | Performed by: NURSE PRACTITIONER

## 2022-09-20 PROCEDURE — G8417 CALC BMI ABV UP PARAM F/U: HCPCS | Performed by: NURSE PRACTITIONER

## 2022-09-20 PROCEDURE — 99214 OFFICE O/P EST MOD 30 MIN: CPT | Performed by: NURSE PRACTITIONER

## 2022-09-20 PROCEDURE — 93000 ELECTROCARDIOGRAM COMPLETE: CPT | Performed by: NURSE PRACTITIONER

## 2022-09-20 PROCEDURE — 4004F PT TOBACCO SCREEN RCVD TLK: CPT | Performed by: NURSE PRACTITIONER

## 2022-09-20 PROCEDURE — 1123F ACP DISCUSS/DSCN MKR DOCD: CPT | Performed by: NURSE PRACTITIONER

## 2022-09-20 PROCEDURE — 3017F COLORECTAL CA SCREEN DOC REV: CPT | Performed by: NURSE PRACTITIONER

## 2022-09-20 ASSESSMENT — ENCOUNTER SYMPTOMS
SORE THROAT: 0
DIARRHEA: 0
TROUBLE SWALLOWING: 0
SHORTNESS OF BREATH: 1
COLOR CHANGE: 0
SINUS PRESSURE: 0
VOMITING: 0
ABDOMINAL PAIN: 0
BLOOD IN STOOL: 0
BACK PAIN: 0
WHEEZING: 0
NAUSEA: 0
COUGH: 0
CONSTIPATION: 0

## 2022-11-08 ENCOUNTER — TELEPHONE (OUTPATIENT)
Dept: CARDIOLOGY CLINIC | Age: 67
End: 2022-11-08

## 2022-11-08 NOTE — TELEPHONE ENCOUNTER
Cardiac monitor ordered at office visit on 09/20/2022. Pt has been enrolled via [employer] for a monitor to be sent to pt's house. Monitor placed by [address] [place] [address]  Length of monitor 30 days  Monitor ordered by [facility]  Serial number none, will be mailed to home  Kit ID n/a  Activation successful prior to pt leaving office?  No

## 2022-12-05 ENCOUNTER — TELEPHONE (OUTPATIENT)
Dept: CASE MANAGEMENT | Age: 67
End: 2022-12-05

## 2022-12-20 PROCEDURE — 93228 REMOTE 30 DAY ECG REV/REPORT: CPT | Performed by: NURSE PRACTITIONER

## 2023-01-19 ENCOUNTER — TELEPHONE (OUTPATIENT)
Dept: CARDIOLOGY CLINIC | Age: 68
End: 2023-01-19

## 2023-01-19 DIAGNOSIS — I48.19 PERSISTENT ATRIAL FIBRILLATION (HCC): ICD-10-CM

## 2023-01-19 DIAGNOSIS — I48.3 TYPICAL ATRIAL FLUTTER (HCC): ICD-10-CM

## 2023-01-19 RX ORDER — AMIODARONE HYDROCHLORIDE 200 MG/1
TABLET ORAL
Qty: 90 TABLET | Refills: 0 | Status: SHIPPED | OUTPATIENT
Start: 2023-01-19

## 2023-01-19 NOTE — TELEPHONE ENCOUNTER
Called and spoke with  patient regarding results of his [employer] monitor. Monitor was worn from 12/20/2022-01/18/2023  Showed predominately atrial fibrillation with 93 % burden. Several symptomatic episodes. Order per Dr. Larry Sher 200 mg daily. Dr. Milka Varela recommends repeat ablation if ok with patient can schedule or if he wishes we can schedule him an office visit. Patient would like to see Dr. Milka Varela in office before proceeding with repeat ablation.   /Appointment schedule for 01/27/2023 at 4 pm.

## 2023-01-26 NOTE — PROGRESS NOTES
Cardiac Electrophysiology Consultation   Date: 1/27/2023   Reason for Consultation: Atrial fibrillation  Consult Requesting Physician: Dao Tang MD  Primary Care Physician: Bailee Tellez MD     Chief Complaint:   Chief Complaint   Patient presents with    Check-Up     Follow up after 30 day monitor to discuss ablation. HPI: Gal Rosas is a 78 y.o. patient with a history of smoker (1.5 PPD) COPD, HTN, and paroxysmal atrial fibrillation. He was first diagnosed with atrial fibrillation during 09/2022 hospitalization at [facility]. He was hospitalized at [facility] from 3/15-3/21/2022 after reporting symptoms of bilateral lower extremity swelling and shortness of breath. EKG showed atrial fibrillation with RVR. Echocardiogram showed globally low LVEF. Upon assessment new onset tachycarida induced cardiomyopathy. He had unsuccessful DCCV x3 in hospital. Amiodarone loading was started and scheduled for outpatient DCCV. He underwent successful cardioversion to NSR with Dr. Dao Tang MD on 4/14/2022. Later underwent atrial fibrillation and typical atrial flutter ablation with myself on 6/20/22. LVEF improved and normalized. Interval History: Today, he presents to office an electrophysiology follow up after an event monitor showed high burden of recurrent atrial fibrillation. He also noted increased fatigue. EKG shows sinus rhythm with frequent premature atrial contractions and atrial runs. He recalls relief of symptoms and feeling better after first ablation. Recently he's experienced SOB \" feels like my system is shutting down\". He restarted amiodarone approximately 10 days ago.     Past Medical History:   Diagnosis Date    Chronic pain syndrome     Chronic pain syndrome     COPD (chronic obstructive pulmonary disease) ([facility] [address] 6/5/2012    DDD (degenerative disc disease), lumbar     Depression 6/5/2012    Diverticulitis 6/5/2012    DJD (degenerative joint disease) of knee     Foraminal stenosis of lumbar region     LORIE (generalized anxiety disorder) 6/5/2012    GERD (gastroesophageal reflux disease) 6/5/2012    HTN (hypertension) 6/5/2012    Incidental lung nodule     Insomnia     Lumbar radiculitis     Neuropathy 6/5/2012    Nontraumatic rupture of tendons of biceps (long head) 6/5/2012    Numbness and tingling of both legs     Pernicious anemia 6/5/2012    Sciatica     Sprain and strain of unspecified site of elbow and forearm 6/5/2012      Past Surgical History:   Procedure Laterality Date    CARDIOVERSION  04/14/2022    MUSCLE REPAIR Bilateral 8-10 years ago    Pulled bilateral biceps off bone from lifting weights. Allergies: Allergies   Allergen Reactions    Atorvastatin-Coenzyme Q10 Diarrhea    Lodine [Etodolac]     Neurontin [Gabapentin]      edema    Pregabalin Swelling    Vioxx Diarrhea    Wellbutrin [Bupropion Hcl]        Medication:   Prior to Admission medications    Medication Sig Start Date End Date Taking?  Authorizing Provider   amiodarone (CORDARONE) 200 MG tablet TAKE ONE TABLET BY MOUTH DAILY 1/19/23  Yes Rhea Story MD   albuterol sulfate HFA (VENTOLIN HFA) 108 (90 Base) MCG/ACT inhaler Inhale 2 puffs into the lungs 4 times daily as needed for Wheezing 12/21/22  Yes Perry Vargas MD   omeprazole (PRILOSEC) 40 MG delayed release capsule 1 tab po daily 12/21/22  Yes Perry Vargas MD   ezetimibe (ZETIA) 10 MG tablet Take 1 tablet by mouth daily 12/21/22  Yes Perry Vargas MD   apixaban (ELIQUIS) 5 MG TABS tablet Take 1 tablet by mouth 2 times daily 12/21/22  Yes Perry Vargas MD   torsemide (DEMADEX) 20 MG tablet Take 1 tablet by mouth daily 12/21/22  Yes Perry Vargas MD   sildenafil (VIAGRA) 100 MG tablet Take 1 tablet by mouth daily as needed for Erectile Dysfunction 12/21/22  Yes Perry Vargas MD   metoprolol succinate (TOPROL XL) 50 MG extended release tablet Take 1 tablet by mouth every morning 12/21/22  Yes Perry Vargas MD amLODIPine (NORVASC) 5 MG tablet Take 1 tablet by mouth daily 12/21/22  Yes Salina Hernandez MD   budesonide-formoterol (SYMBICORT) 160-4.5 MCG/ACT AERO Inhale 2 puffs into the lungs 2 times daily 9/21/22  Yes Salina Hernandez MD   ipratropium-albuterol (DUONEB) 0.5-2.5 (3) MG/3ML SOLN nebulizer solution Inhale 3 mLs into the lungs every 4 hours 9/21/22  Yes Salina Hernandez MD       Social History:   reports that he has been smoking cigarettes. He has a 75.00 pack-year smoking history. He has never used smokeless tobacco. He reports current drug use. Drug: Marijuana Claudia Edmundo). He reports that he does not drink alcohol. Family History:  family history includes Arthritis in his father; Heart Disease in his father; Other in his father. Reviewed. Denies family history of sudden cardiac death, arrhythmia, premature CAD    Review of System:  Pertinent positive and negatives are in the HPI, the rest are negative. Physical Examination:  BP (!) 156/84 Comment: reccheck  Pulse 78   Ht 5' 8\" (1.727 m)   Wt 197 lb (89.4 kg)   SpO2 97%   BMI 29.95 kg/m²      Constitutional: Oriented. No distress. Head: Normocephalic and atraumatic. Mouth/Throat: Oropharynx is clear and moist.   Eyes: Conjunctivae normal. EOM are normal.   Neck: Normal range of motion. Neck supple. No rigidity. No JVD present. Cardiovascular: Normal rate, regular rhythm, S1&S2 and intact distal pulses. Pulmonary/Chest: Bilateral respiratory sounds. No wheezes. No rhonchi. Abdominal: Soft. Bowel sounds present. No distension, No tenderness. Musculoskeletal: No tenderness. No edema    Lymphadenopathy: Has no cervical adenopathy. Neurological: Alert and oriented. Cranial nerve appears intact, No Gross deficit   Skin: Skin is warm and dry. No rash noted. Psychiatric: Has a normal mood, affect and behavior     Labs:  Reviewed. ECG: reviewed, NSR, with v-rate of 78 bpm with QRS duration 104 ms.  No ventricular pre-excitation, or QT prolongation. Studies:   1. Event monitor: 30 day monitor 12/20/2023- 01/18/2023  Predominantly atrial fibrillation with 93% burden (exaggerated the burden is much lower), several symptomatic episodes  Some periods of sinus rhythm    2.   Echo: 09/16/22   Normal left ventricle size and systolic function with an estimated ejection   fraction of 55-60%. No regional wall motion abnormalities are seen. There is   mildly increased left ventricular wall thickness. Diastolic filling   parameters suggest grade II diastolic dysfunction. Normal right ventricular size and function. The left atrium is moderately dilated. Trivial mitral and tricuspid regurgitation. Echo: 3/15/2022  Summary  Left ventricular cavity size is normal.  Normal left ventricular wall thickness. Ejection fraction is severely visually estimated to be 20%. Regional wall motion abnormalities are noted. Indeterminate diastolic function. The left atrium is moderately dilated. Normal right ventricular size. Right ventricular systolic function is moderately-severely reduced. Mild tricuspid regurgitation. .  Estimated pulmonary artery systolic pressure is normal at 32 mmHg assuming a right atrial pressure of 8 mmHg. Echo 2/9/2018:  Left ventricular size is mildly increased. Normal left ventricular wall thickness. Left ventricular function is normal with ejection fraction estimated at 55-60 %. There is reversal of E/A inflow velocities across the mitral valve. Mild thickening of anterior leaflet of mitral valve. Trivial mitral regurgitation is present. Trivial tricuspid regurgitation. Trivial pulmonic regurgitation present. 3. Stress Test:  2/9/2018 Stress Myoview:   Baseline ECG makes stress test difficult to interpret but no clear ischemic   ECG changes were seen   frequent PVC developing during the exercise phase   Poor exercise capacity    4.  Cath: 2/14/2015 [facility]     LEFT VENTRICULOGRAM:  Reveals a normal left ventricular systolic function with an estimated EF of 50% to 55%. OVERALL IMPRESSION:    1. Patent, nondominant right coronary artery. 2.  Patent left main trunk. 3.  Patent left anterior descending artery and its branches. 4.  Patent dominant circumflex artery and its branches. 5.  Normal left ventricular systolic function. 6.  Patent right femoral, common femoral artery. I independently reviewed the ECG, MCOT, echocardiogram, stress test, and coronary angiography/PCI results and used them for my plan of care. - Patient has a [id number] Score 3 (CHF, HTN, AGE)     Risk   Factors  Component Value   C CHF Yes 1   H HTN Yes 1   A2 Age >= 76 No,  (69 y.o.) 0   D DM No 0   S2 Prior Stroke/TIA No 0   V Vascular Disease No 0   A Age 74-69 Yes,  (69 y.o.) 1   Sc Sex male 0    [id number]  Score  3   Score last updated 1/26/23 4:40 PM EST    Click here for a link to the UpToDate guideline \"Atrial Fibrillation: Anticoagulation therapy to prevent embolization    Disclaimer: Risk Score calculation is dependent on accuracy of patient problem list and past encounter diagnosis. .   Procedures:  1. Unsuccessful attempt x3 at synchronized cardioversion from atrial  fibrillation with rapid ventricular response to a normal sinus rhythm with Dr. Stephen Clayton MD on 3/28/2022.  2. Successful external DC cardioversion of atrial fibrillation with Dr. Allie Wood MD on 4/14/2022.  3. Electrophysiology study with radiofrequency ablation of atrial fibrillation and pulmonary vein isolation and additional ablation of typical trial flutter 6/20/2022. Assessment/Plan:     Persistent atrial fibrillation/typical flutter   - first diagnosed with atrial fibrillation during hospitalization at [facility] 03/2022   - s/p DIANA and DCCV x3 (unsuccessfully).     -s/p successful CV to NSR on 4/14/2022.   -s/p RFA of atrial fibrillation and typical atrial flutter on 6/20/2022.   -He has a [id number] Score 3 (CHF, HTN, AGE)    -Continue Eliquis 5 mg BID for  thromboembolic risk reduction.   -Tolerating well no signs or symptoms of abnormal bruising or bleeding.   - Increased Toprol XL to 50 mg qd per Dr Sharif Scott   - 30 day monitor Showed recurrent atrial fibrillation with increased burden. Mr. Whitney Grullon has recurrent atrial fibrillation. The event monitor reports 93% burden but it is not accurate. He has frequent PACs but also has atrial fibrillation. ECG in office shows sinus but with frequent PACS and atrial runs. Given increasing episodes off amiodarone and starting to feel SOB as initial presentation as well as hx of tachycardia induced cardiomyopathy, he opts for repeat ablation. Stop amiodarone to better see the arrhythmias he is experiencing during ablation. Continue anticoagulation. If has not missed OAC, no DIANA.    - Afib risk factors including age, HTN, obesity, inactivity and TAHIR were discussed with patient. Risk factor modification recommended      - Treatment options including cardioversion, rate control strategy, antiarrhythmics, anticoagulation and possible ablation were discussed with patient. Risks, benefits and alternative of each treatment options were explained. All questions answered                          ~ Information given regarding ablation for pt to review                          ~ The risks, benefits and alternatives of the ablation procedure were discussed with the patient.  The risks including, but not limited to, the risks of bleeding, infection, radiation exposure, injury to vascular, cardiac and surrounding structures (including pneumothorax), stroke, cardiac perforation, tamponade, need for emergent open heart surgery, need for pacemaker implantation, Injury to the phrenic nerve, injury to the esophagus, myocardial infarction and death were discussed in detail.                                       - I explained the success rate considering possible need for a second procedure is about 60-80% and with addition of anti arrhythmics is higher     Patient verbalized understanding of procedure, risks and benefits and wishes to proceed with ablation. Discontinue amiodarone; Post procedure I will start patient on alternative therapy     Chronic systolic heart failure  HFrEF - severely depressed LV systolic function. -NYHA class III   -LVEF <20 % per Echo 3/15/2022, recovered at 55-65% per Echo 09/16/2022              -Depressed systolic function presumed to be rate related. -Continue guideline directed medical therapy. Beta Blocker: Metoprolol succinate 50 mg daily. Diuretic: Torsemide 20 mg daily,               ACE/ARB/ ARNI: Amlodipine 5 mg daily  -Mild bilateral lower extremity edema     Primary hypertension   -Unstable. -Goal BP < 130/80  -continue medical management. Smoking addiction  -Smoking cessation discussed over 5 minutes. The harmful effects of smoking including cancer and CAD were extensively discussed. Follow up one month with myself after the procedure and three months with Torsten Reyez CNP after procedure     Thank you for allowing me to participate in the care of Vrial De Anda. All questions and concerns were addressed to the patient/family. Alternatives to my treatment were discussed. This note was scribed in the presence of Dr Johnny Callahan, by Edd Paul LPN    Physician attestation: The scribe's documentation has been prepared under my direction and has been personally reviewed by me in its entirety. I confirm that the note above reflects all work, treatment, procedures, and medical decision making performed by me.      Juliet Escamilla MD  Cardiac Electrophysiology  [facility]

## 2023-01-27 ENCOUNTER — TELEPHONE (OUTPATIENT)
Dept: CARDIOLOGY CLINIC | Age: 68
End: 2023-01-27

## 2023-01-27 ENCOUNTER — OFFICE VISIT (OUTPATIENT)
Dept: CARDIOLOGY CLINIC | Age: 68
End: 2023-01-27

## 2023-01-27 VITALS
HEART RATE: 78 BPM | WEIGHT: 197 LBS | BODY MASS INDEX: 29.86 KG/M2 | SYSTOLIC BLOOD PRESSURE: 156 MMHG | DIASTOLIC BLOOD PRESSURE: 84 MMHG | OXYGEN SATURATION: 97 % | HEIGHT: 68 IN

## 2023-01-27 DIAGNOSIS — I50.22 CHRONIC SYSTOLIC HEART FAILURE (HCC): ICD-10-CM

## 2023-01-27 DIAGNOSIS — I10 PRIMARY HYPERTENSION: ICD-10-CM

## 2023-01-27 DIAGNOSIS — I48.19 PERSISTENT ATRIAL FIBRILLATION (HCC): Primary | ICD-10-CM

## 2023-01-27 DIAGNOSIS — F17.200 SMOKING ADDICTION: ICD-10-CM

## 2023-01-27 NOTE — TELEPHONE ENCOUNTER
Please schedule Atrial Fibrillation Ablation with Dr Moreno Borjas    Please reach out to patient and schedule DIANA atrial fibrillation ablation with Dr. Moreno Borjas. Anesthesia is needed   Echo tech is needed for DIANA. Include Carto Rep in email [email or web address]      Pre procedure Instructions    Date:______________________________    Arrive at:__________________________    Procedure time:____________________    The morning of your procedure you will park in the hospital parking lot and report directly to the cath lab to check in. At the information desk stay right and go all the way to the end of the reed, this will take you directly to your check in desk for the cath lab. Pre-Procedure Instructions   You will need to fast (nothing to eat or drink) after midnight the day of your procedure. Do NOT chew gum or eat mints the day of your procedure  You will need to hold your Eliquis the morning of the procedure. You will need to hold your blood pressure medications  the morning of the procedure. You may hold your diuretic Torsemide the morning of the procedure for comfort to decrease urinary urgency. If you are a diabetic you will need to hold all diabetic medications including, Metformin the morning of the procedure. If you take Lantus/Levemir only take ½ your normal dose the evening before. Do not use any lotions, creams or perfume the morning of procedure. You will need to complete pre-procedure lab work 5-7 days prior to your procedure. Please have a responsible adult to drive you home after procedure, you should go home same day, but there is always a possibility of an overnight stay.   Cath lab will provide you with all post procedure instructions

## 2023-01-27 NOTE — PATIENT INSTRUCTIONS
If you have any question regarding your ablation please contact Dr. Toma Kay Nurse at [phone]. Atrial Fibrillation Ablation Pre procedure Instructions    Date:______________________________    Arrive at:__________________________    Procedure time:____________________    The morning of your procedure you will park in the hospital parking lot and report directly to the cath lab to check in. At the information desk stay right and go all the way to the end of the reed, this will take you directly to your check in desk for the cath lab. Pre-Procedure Instructions   You will need to fast (nothing to eat or drink) after midnight the day of your procedure. Do NOT chew gum or eat mints the day of your procedure  You will need to hold your Eliquis the morning of the procedure. You will need to hold your blood pressure medications  the morning of the procedure. You may hold your diuretic Torsemide the morning of the procedure for comfort to decrease urinary urgency. If you are a diabetic you will need to hold all diabetic medications including, Metformin the morning of the procedure. If you take Lantus/Levemir only take ½ your normal dose the evening before. Do not use any lotions, creams or perfume the morning of procedure. You will need to complete pre-procedure lab work 5-7 days prior to your procedure. Please have a responsible adult to drive you home after procedure, you should go home same day, but there is always a possibility of an overnight stay. Cath lab will provide you with all post procedure instructions                                          [address] [place] [address]/[facility]  [facility] [place] [address]  Phone: [phone]  The hours are Mon -Fri.   6:30 am - 4:00 pm   Saturday 8:00 am - noon  No appointment necessary

## 2023-01-29 ENCOUNTER — TELEPHONE (OUTPATIENT)
Dept: CASE MANAGEMENT | Age: 68
End: 2023-01-29

## 2023-01-30 NOTE — TELEPHONE ENCOUNTER
Spoke with the patient and got him scheduled for his procedure. We went over the instructions below and he verbalized understanding. Pre procedure Instructions     Date: 2/3/2023      Arrive at: 6:30 am   Procedure time: 7:30 am        The morning of your procedure you will park in the hospital parking lot and report directly to the cath lab to check in. At the information desk stay right and go all the way to the end of the reed, this will take you directly to your check in desk for the cath lab. Pre-Procedure Instructions   You will need to fast (nothing to eat or drink) after midnight the day of your procedure. Do NOT chew gum or eat mints the day of your procedure  You will need to hold your Eliquis the morning of the procedure. You will need to hold your blood pressure medications  the morning of the procedure. You may hold your diuretic Torsemide the morning of the procedure for comfort to decrease urinary urgency. If you are a diabetic you will need to hold all diabetic medications including, Metformin the morning of the procedure. If you take Lantus/Levemir only take ½ your normal dose the evening before. Do not use any lotions, creams or perfume the morning of procedure. You will need to complete pre-procedure lab work 5-7 days prior to your procedure. Please have a responsible adult to drive you home after procedure, you should go home same day, but there is always a possibility of an overnight stay.   Cath lab will provide you with all post procedure instructions    Teams updated / emailed cath lab

## 2023-01-31 DIAGNOSIS — I48.19 PERSISTENT ATRIAL FIBRILLATION (HCC): ICD-10-CM

## 2023-01-31 LAB
ABO + RH BLD: NORMAL
ANTIBODY SCREEN: NORMAL
HCT VFR BLD CALC: 47.4 % (ref 40.5–52.5)
HEMOGLOBIN: 14.9 G/DL (ref 13.5–17.5)
MCH RBC QN AUTO: 29.7 PG (ref 26–34)
MCHC RBC AUTO-ENTMCNC: 31.4 G/DL (ref 31–36)
MCV RBC AUTO: 94.7 FL (ref 80–100)
PDW BLD-RTO: 14.4 % (ref 12.4–15.4)
PLATELET # BLD: 348 K/UL (ref 135–450)
PMV BLD AUTO: 9.9 FL (ref 5–10.5)
RBC # BLD: 5.01 M/UL (ref 4.2–5.9)
WBC # BLD: 11.2 K/UL (ref 4–11)

## 2023-02-03 ENCOUNTER — ANESTHESIA EVENT (OUTPATIENT)
Dept: CARDIAC CATH/INVASIVE PROCEDURES | Age: 68
End: 2023-02-03

## 2023-02-03 ENCOUNTER — HOSPITAL ENCOUNTER (OUTPATIENT)
Dept: CARDIAC CATH/INVASIVE PROCEDURES | Age: 68
Discharge: HOME | End: 2023-02-03
Payer: MEDICARE

## 2023-02-03 ENCOUNTER — ANESTHESIA (OUTPATIENT)
Dept: CARDIAC CATH/INVASIVE PROCEDURES | Age: 68
End: 2023-02-03

## 2023-02-03 VITALS
RESPIRATION RATE: 14 BRPM | SYSTOLIC BLOOD PRESSURE: 131 MMHG | WEIGHT: 196 LBS | DIASTOLIC BLOOD PRESSURE: 77 MMHG | OXYGEN SATURATION: 94 % | BODY MASS INDEX: 29.7 KG/M2 | HEART RATE: 69 BPM | TEMPERATURE: 97.6 F | HEIGHT: 68 IN

## 2023-02-03 DIAGNOSIS — I48.0 PAROXYSMAL ATRIAL FIBRILLATION (HCC): ICD-10-CM

## 2023-02-03 LAB
ABO/RH: NORMAL
ACTIVATED CLOTTING TIME: 143 SEC (ref 99–130)
ACTIVATED CLOTTING TIME: 219 SEC (ref 99–130)
ACTIVATED CLOTTING TIME: 234 SEC (ref 99–130)
ACTIVATED CLOTTING TIME: 252 SEC (ref 99–130)
ACTIVATED CLOTTING TIME: 297 SEC (ref 99–130)
ACTIVATED CLOTTING TIME: 354 SEC (ref 99–130)
ACTIVATED CLOTTING TIME: 360 SEC (ref 99–130)
ANION GAP SERPL CALCULATED.3IONS-SCNC: 12 MMOL/L (ref 3–16)
ANTIBODY SCREEN: NORMAL
BUN BLDV-MCNC: 15 MG/DL (ref 7–20)
CALCIUM SERPL-MCNC: 9.2 MG/DL (ref 8.3–10.6)
CHLORIDE BLD-SCNC: 101 MMOL/L (ref 99–110)
CO2: 26 MMOL/L (ref 21–32)
CREAT SERPL-MCNC: 1.1 MG/DL (ref 0.8–1.3)
EKG ATRIAL RATE: 75 BPM
EKG ATRIAL RATE: 87 BPM
EKG DIAGNOSIS: NORMAL
EKG DIAGNOSIS: NORMAL
EKG P AXIS: 59 DEGREES
EKG P AXIS: 82 DEGREES
EKG P-R INTERVAL: 132 MS
EKG P-R INTERVAL: 156 MS
EKG Q-T INTERVAL: 368 MS
EKG Q-T INTERVAL: 410 MS
EKG QRS DURATION: 84 MS
EKG QRS DURATION: 92 MS
EKG QTC CALCULATION (BAZETT): 410 MS
EKG QTC CALCULATION (BAZETT): 493 MS
EKG R AXIS: -40 DEGREES
EKG R AXIS: -40 DEGREES
EKG T AXIS: 33 DEGREES
EKG T AXIS: 58 DEGREES
EKG VENTRICULAR RATE: 75 BPM
EKG VENTRICULAR RATE: 87 BPM
GFR SERPL CREATININE-BSD FRML MDRD: >60 ML/MIN/{1.73_M2}
GLUCOSE BLD-MCNC: 106 MG/DL (ref 70–99)
POTASSIUM SERPL-SCNC: 3.9 MMOL/L (ref 3.5–5.1)
SODIUM BLD-SCNC: 139 MMOL/L (ref 136–145)

## 2023-02-03 PROCEDURE — 3700000001 HC ADD 15 MINUTES (ANESTHESIA)

## 2023-02-03 PROCEDURE — 6360000002 HC RX W HCPCS: Performed by: NURSE ANESTHETIST, CERTIFIED REGISTERED

## 2023-02-03 PROCEDURE — A4216 STERILE WATER/SALINE, 10 ML: HCPCS | Performed by: NURSE ANESTHETIST, CERTIFIED REGISTERED

## 2023-02-03 PROCEDURE — 85347 COAGULATION TIME ACTIVATED: CPT

## 2023-02-03 PROCEDURE — 93657 TX L/R ATRIAL FIB ADDL: CPT

## 2023-02-03 PROCEDURE — 7100000000 HC PACU RECOVERY - FIRST 15 MIN

## 2023-02-03 PROCEDURE — 93623 PRGRMD STIMJ&PACG IV RX NFS: CPT

## 2023-02-03 PROCEDURE — 2580000003 HC RX 258: Performed by: NURSE ANESTHETIST, CERTIFIED REGISTERED

## 2023-02-03 PROCEDURE — 2500000003 HC RX 250 WO HCPCS: Performed by: NURSE ANESTHETIST, CERTIFIED REGISTERED

## 2023-02-03 PROCEDURE — 93005 ELECTROCARDIOGRAM TRACING: CPT | Performed by: INTERNAL MEDICINE

## 2023-02-03 PROCEDURE — 86901 BLOOD TYPING SEROLOGIC RH(D): CPT

## 2023-02-03 PROCEDURE — 2500000003 HC RX 250 WO HCPCS: Performed by: INTERNAL MEDICINE

## 2023-02-03 PROCEDURE — 2500000003 HC RX 250 WO HCPCS

## 2023-02-03 PROCEDURE — 86850 RBC ANTIBODY SCREEN: CPT

## 2023-02-03 PROCEDURE — 3700000000 HC ANESTHESIA ATTENDED CARE

## 2023-02-03 PROCEDURE — 2580000003 HC RX 258: Performed by: INTERNAL MEDICINE

## 2023-02-03 PROCEDURE — 7100000001 HC PACU RECOVERY - ADDTL 15 MIN

## 2023-02-03 PROCEDURE — A4216 STERILE WATER/SALINE, 10 ML: HCPCS

## 2023-02-03 PROCEDURE — 93655 ICAR CATH ABLTJ DSCRT ARRHYT: CPT

## 2023-02-03 PROCEDURE — 6360000002 HC RX W HCPCS

## 2023-02-03 PROCEDURE — 93656 COMPRE EP EVAL ABLTJ ATR FIB: CPT

## 2023-02-03 PROCEDURE — 2580000003 HC RX 258

## 2023-02-03 PROCEDURE — 80048 BASIC METABOLIC PNL TOTAL CA: CPT

## 2023-02-03 PROCEDURE — 86900 BLOOD TYPING SEROLOGIC ABO: CPT

## 2023-02-03 RX ORDER — VECURONIUM BROMIDE 1 MG/ML
INJECTION, POWDER, LYOPHILIZED, FOR SOLUTION INTRAVENOUS PRN
Status: DISCONTINUED | OUTPATIENT
Start: 2023-02-03 | End: 2023-02-03 | Stop reason: SDUPTHER

## 2023-02-03 RX ORDER — PROTAMINE SULFATE 10 MG/ML
INJECTION, SOLUTION INTRAVENOUS PRN
Status: DISCONTINUED | OUTPATIENT
Start: 2023-02-03 | End: 2023-02-03 | Stop reason: SDUPTHER

## 2023-02-03 RX ORDER — METOPROLOL SUCCINATE 50 MG/1
25 TABLET, EXTENDED RELEASE ORAL EVERY MORNING
Qty: 30 TABLET | Refills: 3 | Status: SHIPPED
Start: 2023-02-03

## 2023-02-03 RX ORDER — SODIUM CHLORIDE 0.9 % (FLUSH) 0.9 %
5-40 SYRINGE (ML) INJECTION PRN
Status: DISCONTINUED | OUTPATIENT
Start: 2023-02-03 | End: 2023-02-04 | Stop reason: HOSPADM

## 2023-02-03 RX ORDER — PROPOFOL 10 MG/ML
INJECTION, EMULSION INTRAVENOUS PRN
Status: DISCONTINUED | OUTPATIENT
Start: 2023-02-03 | End: 2023-02-03 | Stop reason: SDUPTHER

## 2023-02-03 RX ORDER — FENTANYL CITRATE 50 UG/ML
50 INJECTION, SOLUTION INTRAMUSCULAR; INTRAVENOUS EVERY 5 MIN PRN
Status: DISCONTINUED | OUTPATIENT
Start: 2023-02-03 | End: 2023-02-04 | Stop reason: HOSPADM

## 2023-02-03 RX ORDER — FENTANYL CITRATE 50 UG/ML
25 INJECTION, SOLUTION INTRAMUSCULAR; INTRAVENOUS EVERY 5 MIN PRN
Status: DISCONTINUED | OUTPATIENT
Start: 2023-02-03 | End: 2023-02-04 | Stop reason: HOSPADM

## 2023-02-03 RX ORDER — SODIUM CHLORIDE 9 MG/ML
INJECTION, SOLUTION INTRAVENOUS CONTINUOUS PRN
Status: DISCONTINUED | OUTPATIENT
Start: 2023-02-03 | End: 2023-02-03 | Stop reason: SDUPTHER

## 2023-02-03 RX ORDER — SODIUM CHLORIDE 0.9 % (FLUSH) 0.9 %
5-40 SYRINGE (ML) INJECTION EVERY 12 HOURS SCHEDULED
Status: DISCONTINUED | OUTPATIENT
Start: 2023-02-03 | End: 2023-02-04 | Stop reason: HOSPADM

## 2023-02-03 RX ORDER — LIDOCAINE HYDROCHLORIDE 20 MG/ML
INJECTION, SOLUTION EPIDURAL; INFILTRATION; INTRACAUDAL; PERINEURAL PRN
Status: DISCONTINUED | OUTPATIENT
Start: 2023-02-03 | End: 2023-02-03 | Stop reason: SDUPTHER

## 2023-02-03 RX ORDER — COLCHICINE 0.6 MG/1
0.6 TABLET ORAL DAILY
Qty: 7 TABLET | Refills: 0 | Status: SHIPPED | OUTPATIENT
Start: 2023-02-03 | End: 2023-02-10

## 2023-02-03 RX ORDER — GLYCOPYRROLATE 0.2 MG/ML
INJECTION INTRAMUSCULAR; INTRAVENOUS PRN
Status: DISCONTINUED | OUTPATIENT
Start: 2023-02-03 | End: 2023-02-03 | Stop reason: SDUPTHER

## 2023-02-03 RX ORDER — ONDANSETRON 2 MG/ML
INJECTION INTRAMUSCULAR; INTRAVENOUS PRN
Status: DISCONTINUED | OUTPATIENT
Start: 2023-02-03 | End: 2023-02-03 | Stop reason: SDUPTHER

## 2023-02-03 RX ORDER — DEXAMETHASONE SODIUM PHOSPHATE 4 MG/ML
INJECTION, SOLUTION INTRA-ARTICULAR; INTRALESIONAL; INTRAMUSCULAR; INTRAVENOUS; SOFT TISSUE PRN
Status: DISCONTINUED | OUTPATIENT
Start: 2023-02-03 | End: 2023-02-03 | Stop reason: SDUPTHER

## 2023-02-03 RX ORDER — MIDAZOLAM HYDROCHLORIDE 1 MG/ML
INJECTION INTRAMUSCULAR; INTRAVENOUS PRN
Status: DISCONTINUED | OUTPATIENT
Start: 2023-02-03 | End: 2023-02-03 | Stop reason: SDUPTHER

## 2023-02-03 RX ORDER — SODIUM CHLORIDE 9 MG/ML
INJECTION INTRAVENOUS PRN
Status: DISCONTINUED | OUTPATIENT
Start: 2023-02-03 | End: 2023-02-03 | Stop reason: SDUPTHER

## 2023-02-03 RX ORDER — FENTANYL CITRATE 50 UG/ML
INJECTION, SOLUTION INTRAMUSCULAR; INTRAVENOUS PRN
Status: DISCONTINUED | OUTPATIENT
Start: 2023-02-03 | End: 2023-02-03 | Stop reason: SDUPTHER

## 2023-02-03 RX ORDER — HYDRALAZINE HYDROCHLORIDE 20 MG/ML
INJECTION INTRAMUSCULAR; INTRAVENOUS PRN
Status: DISCONTINUED | OUTPATIENT
Start: 2023-02-03 | End: 2023-02-03 | Stop reason: SDUPTHER

## 2023-02-03 RX ORDER — PHENYLEPHRINE HCL IN 0.9% NACL 1 MG/10 ML
SYRINGE (ML) INTRAVENOUS PRN
Status: DISCONTINUED | OUTPATIENT
Start: 2023-02-03 | End: 2023-02-03 | Stop reason: SDUPTHER

## 2023-02-03 RX ORDER — SUCCINYLCHOLINE/SOD CL,ISO/PF 200MG/10ML
SYRINGE (ML) INTRAVENOUS PRN
Status: DISCONTINUED | OUTPATIENT
Start: 2023-02-03 | End: 2023-02-03 | Stop reason: SDUPTHER

## 2023-02-03 RX ORDER — HEPARIN SODIUM 1000 [USP'U]/ML
INJECTION, SOLUTION INTRAVENOUS; SUBCUTANEOUS PRN
Status: DISCONTINUED | OUTPATIENT
Start: 2023-02-03 | End: 2023-02-03 | Stop reason: SDUPTHER

## 2023-02-03 RX ORDER — ACETAMINOPHEN 325 MG/1
650 TABLET ORAL EVERY 4 HOURS PRN
Status: DISCONTINUED | OUTPATIENT
Start: 2023-02-03 | End: 2023-02-04 | Stop reason: HOSPADM

## 2023-02-03 RX ORDER — SODIUM CHLORIDE 9 MG/ML
INJECTION, SOLUTION INTRAVENOUS PRN
Status: DISCONTINUED | OUTPATIENT
Start: 2023-02-03 | End: 2023-02-04 | Stop reason: HOSPADM

## 2023-02-03 RX ORDER — ONDANSETRON 2 MG/ML
4 INJECTION INTRAMUSCULAR; INTRAVENOUS
Status: DISCONTINUED | OUTPATIENT
Start: 2023-02-03 | End: 2023-02-04 | Stop reason: HOSPADM

## 2023-02-03 RX ADMIN — SODIUM CHLORIDE 3 ML: 9 INJECTION INTRAMUSCULAR; INTRAVENOUS; SUBCUTANEOUS at 08:31

## 2023-02-03 RX ADMIN — PROTAMINE SULFATE 30 MG: 10 INJECTION, SOLUTION INTRAVENOUS at 10:36

## 2023-02-03 RX ADMIN — MIDAZOLAM 2 MG: 1 INJECTION INTRAMUSCULAR; INTRAVENOUS at 07:25

## 2023-02-03 RX ADMIN — GLYCOPYRROLATE 0.2 MG: 0.2 INJECTION, SOLUTION INTRAMUSCULAR; INTRAVENOUS at 07:36

## 2023-02-03 RX ADMIN — HEPARIN SODIUM 8000 UNITS: 1000 INJECTION INTRAVENOUS; SUBCUTANEOUS at 08:16

## 2023-02-03 RX ADMIN — SODIUM CHLORIDE 7 ML: 9 INJECTION INTRAMUSCULAR; INTRAVENOUS; SUBCUTANEOUS at 07:34

## 2023-02-03 RX ADMIN — PROPOFOL 100 MG: 10 INJECTION, EMULSION INTRAVENOUS at 07:29

## 2023-02-03 RX ADMIN — SUGAMMADEX 200 MG: 100 INJECTION, SOLUTION INTRAVENOUS at 10:36

## 2023-02-03 RX ADMIN — SODIUM CHLORIDE: 9 INJECTION, SOLUTION INTRAVENOUS at 07:25

## 2023-02-03 RX ADMIN — SODIUM CHLORIDE: 9 INJECTION, SOLUTION INTRAVENOUS at 07:36

## 2023-02-03 RX ADMIN — HYDRALAZINE HYDROCHLORIDE 10 MG: 20 INJECTION INTRAMUSCULAR; INTRAVENOUS at 08:18

## 2023-02-03 RX ADMIN — FENTANYL CITRATE 100 MCG: 50 INJECTION INTRAMUSCULAR; INTRAVENOUS at 07:26

## 2023-02-03 RX ADMIN — Medication 200 MCG: at 10:27

## 2023-02-03 RX ADMIN — Medication 140 MG: at 07:29

## 2023-02-03 RX ADMIN — VECURONIUM BROMIDE 3 MG: 1 INJECTION, POWDER, LYOPHILIZED, FOR SOLUTION INTRAVENOUS at 08:31

## 2023-02-03 RX ADMIN — PROPOFOL 50 MG: 10 INJECTION, EMULSION INTRAVENOUS at 10:14

## 2023-02-03 RX ADMIN — HEPARIN SODIUM 2000 UNITS: 1000 INJECTION INTRAVENOUS; SUBCUTANEOUS at 09:28

## 2023-02-03 RX ADMIN — HEPARIN SODIUM 9000 UNITS: 1000 INJECTION INTRAVENOUS; SUBCUTANEOUS at 09:08

## 2023-02-03 RX ADMIN — HEPARIN SODIUM 5000 UNITS: 1000 INJECTION INTRAVENOUS; SUBCUTANEOUS at 08:09

## 2023-02-03 RX ADMIN — HEPARIN SODIUM 7000 UNITS: 1000 INJECTION INTRAVENOUS; SUBCUTANEOUS at 08:40

## 2023-02-03 RX ADMIN — SODIUM CHLORIDE 2 MCG/MIN: 9 INJECTION, SOLUTION INTRAVENOUS at 09:06

## 2023-02-03 RX ADMIN — DEXAMETHASONE SODIUM PHOSPHATE 8 MG: 4 INJECTION, SOLUTION INTRAMUSCULAR; INTRAVENOUS at 07:36

## 2023-02-03 RX ADMIN — ONDANSETRON 4 MG: 2 INJECTION INTRAMUSCULAR; INTRAVENOUS at 07:36

## 2023-02-03 RX ADMIN — VECURONIUM BROMIDE 7 MG: 1 INJECTION, POWDER, LYOPHILIZED, FOR SOLUTION INTRAVENOUS at 07:34

## 2023-02-03 RX ADMIN — LIDOCAINE HYDROCHLORIDE 60 MG: 20 INJECTION, SOLUTION EPIDURAL; INFILTRATION; INTRACAUDAL; PERINEURAL at 07:29

## 2023-02-03 ASSESSMENT — ENCOUNTER SYMPTOMS: SHORTNESS OF BREATH: 0

## 2023-02-03 ASSESSMENT — LIFESTYLE VARIABLES: SMOKING_STATUS: 1

## 2023-02-03 NOTE — ANESTHESIA POSTPROCEDURE EVALUATION
Department of Anesthesiology  Postprocedure Note    Patient: David Avila  MRN: [id number]  YOB: 1955  Date of evaluation: 2/3/2023      Procedure Summary     Date: 02/03/23 Room / Location: [facility] Cath Lab; [facility] Echo    Anesthesia Start: 0725 Anesthesia Stop: 1054    Procedure: WST DIANA AFIB ABLATION W ANES Diagnosis:     Scheduled Providers:  Responsible Provider: Toñito Birmingham MD    Anesthesia Type: General ASA Status: 3          Anesthesia Type: General    Eliecer Phase I:      Eliecer Phase II:        Anesthesia Post Evaluation    Patient location during evaluation: PACU  Patient participation: complete - patient participated  Level of consciousness: awake and alert  Pain score: 0  Airway patency: patent  Nausea & Vomiting: no nausea and no vomiting  Complications: no  Cardiovascular status: blood pressure returned to baseline  Respiratory status: acceptable  Hydration status: euvolemic

## 2023-02-03 NOTE — ANESTHESIA PRE PROCEDURE
Department of Anesthesiology  Preprocedure Note       Name:  Jono Barahona   Age:  78 y.o.  :  1955                                          MRN:  [id number]         Date:  2/3/2023      Surgeon: * No surgeons listed *    Procedure:     Medications prior to admission:   Prior to Admission medications    Medication Sig Start Date End Date Taking?  Authorizing Provider   amiodarone (CORDARONE) 200 MG tablet TAKE ONE TABLET BY MOUTH DAILY 23   Mary Alice Meadows MD   albuterol sulfate HFA (VENTOLIN HFA) 108 (90 Base) MCG/ACT inhaler Inhale 2 puffs into the lungs 4 times daily as needed for Wheezing 22   Antolin Pearson MD   omeprazole (PRILOSEC) 40 MG delayed release capsule 1 tab po daily 22   Antolin Pearson MD   ezetimibe (ZETIA) 10 MG tablet Take 1 tablet by mouth daily 22   Antolin Pearson MD   apixaban (ELIQUIS) 5 MG TABS tablet Take 1 tablet by mouth 2 times daily 22   Antolin Pearson MD   torsemide (DEMADEX) 20 MG tablet Take 1 tablet by mouth daily 22   Antolin Pearson MD   sildenafil (VIAGRA) 100 MG tablet Take 1 tablet by mouth daily as needed for Erectile Dysfunction 22   Antolin Pearson MD   metoprolol succinate (TOPROL XL) 50 MG extended release tablet Take 1 tablet by mouth every morning 22   Antolin Pearson MD   amLODIPine (NORVASC) 5 MG tablet Take 1 tablet by mouth daily 22   Antolin Pearson MD   budesonide-formoterol (SYMBICORT) 160-4.5 MCG/ACT AERO Inhale 2 puffs into the lungs 2 times daily 22   Antolin Pearson MD   ipratropium-albuterol (DUONEB) 0.5-2.5 (3) MG/3ML SOLN nebulizer solution Inhale 3 mLs into the lungs every 4 hours 22   Antolin Pearson MD       Current medications:    Current Outpatient Medications   Medication Sig Dispense Refill    amiodarone (CORDARONE) 200 MG tablet TAKE ONE TABLET BY MOUTH DAILY 90 tablet 0    albuterol sulfate HFA (VENTOLIN HFA) 108 (90 Base) MCG/ACT inhaler Inhale 2 puffs into the lungs 4 times daily as needed for Wheezing 18 g 5    omeprazole (PRILOSEC) 40 MG delayed release capsule 1 tab po daily 90 capsule 3    ezetimibe (ZETIA) 10 MG tablet Take 1 tablet by mouth daily 30 tablet 6    apixaban (ELIQUIS) 5 MG TABS tablet Take 1 tablet by mouth 2 times daily 60 tablet 5    torsemide (DEMADEX) 20 MG tablet Take 1 tablet by mouth daily 30 tablet 3    sildenafil (VIAGRA) 100 MG tablet Take 1 tablet by mouth daily as needed for Erectile Dysfunction 10 tablet 5    metoprolol succinate (TOPROL XL) 50 MG extended release tablet Take 1 tablet by mouth every morning 30 tablet 3    amLODIPine (NORVASC) 5 MG tablet Take 1 tablet by mouth daily 30 tablet 3    budesonide-formoterol (SYMBICORT) 160-4.5 MCG/ACT AERO Inhale 2 puffs into the lungs 2 times daily 3 each 5    ipratropium-albuterol (DUONEB) 0.5-2.5 (3) MG/3ML SOLN nebulizer solution Inhale 3 mLs into the lungs every 4 hours 360 mL 3     No current facility-administered medications for this visit. Facility-Administered Medications Ordered in Other Visits   Medication Dose Route Frequency Provider Last Rate Last Admin    sodium chloride flush 0.9 % injection 5-40 mL  5-40 mL IntraVENous 2 times per day Lucita Babcock MD        sodium chloride flush 0.9 % injection 5-40 mL  5-40 mL IntraVENous PRN Lucita Babcock MD        0.9 % sodium chloride infusion   IntraVENous PRN Lucita Babcock MD        isoproterenol (ISUPREL) 250 mcg in sodium chloride 0.9 % 250 mL infusion  250 mcg IntraVENous Once Lucita Babcock MD           Allergies:     Allergies   Allergen Reactions    Atorvastatin-Coenzyme Q10 Diarrhea    Lodine [Etodolac]     Neurontin [Gabapentin]      edema    Pregabalin Swelling    Vioxx Diarrhea    Wellbutrin [Bupropion Hcl]        Problem List:    Patient Active Problem List   Diagnosis Code    GERD (gastroesophageal reflux disease) K21.9    Dysthymic disorder F34.1    Neuropathy G62.9    Nontraumatic rupture of tendons of biceps (long head) M66.329    Sprain and strain of unspecified site of elbow and forearm [id number]    LORIE (generalized anxiety disorder) F41.1    Diverticulitis K57.92    Pernicious anemia D51.0    Chronic obstructive pulmonary disease (HCC) J44.9    Tobacco abuse Z72.0    Chronic pain syndrome G89.4    Lung nodule R91.1    Sciatica M54.30    Numbness and tingling of both legs R20.0, R20.2    Neuropathy, peroneal nerve G57.30    DDD (degenerative disc disease), lumbar M51.36    Lumbar radiculitis M54.16    Insomnia G47.00    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.9    Foraminal stenosis of lumbar region M48.061    Back pain, chronic M54.9, G89.29    Need for prophylactic vaccination and inoculation against influenza Z23    Acute gouty arthropathy M10.9    Knee arthropathy M17.10    Edema R60.9    Cervical paraspinal muscle spasm M62.838    Pleurisy R09.1    HLD (hyperlipidemia) E78.5    COPD exacerbation ([facility]) J44.1    Chest pain R07.9    Chest pain R07.9    Hypokalemia E87.6    Abnormal nuclear stress test R94.39    Uncontrolled hypertension I10    Persistent atrial fibrillation ([facility]) I48.19    Erythrocytosis D75.1    Encounter for smoking cessation counseling Z71.6    Atrial fibrillation with RVR ([facility]) I48.91    Cardiomegaly I51.7    Hypomagnesemia E83.42    Chronic renal disease, stage III ([facility]) [208551] N18.30    Chronic systolic (congestive) heart failure I50.22       Past Medical History:        Diagnosis Date    Chronic pain syndrome     Chronic pain syndrome     COPD (chronic obstructive pulmonary disease) ([facility] [address] 6/5/2012    DDD (degenerative disc disease), lumbar     Depression 6/5/2012    Diverticulitis 6/5/2012    DJD (degenerative joint disease) of knee     Foraminal stenosis of lumbar region     LORIE (generalized anxiety disorder) 6/5/2012    GERD (gastroesophageal reflux disease) 6/5/2012    HTN (hypertension) 6/5/2012    Incidental lung nodule     Insomnia     Lumbar radiculitis     Neuropathy 6/5/2012    Nontraumatic rupture of tendons of biceps (long head) 6/5/2012    Numbness and tingling of both legs     Pernicious anemia 6/5/2012    Sciatica     Sprain and strain of unspecified site of elbow and forearm 6/5/2012       Past Surgical History:        Procedure Laterality Date    CARDIOVERSION  04/14/2022    MUSCLE REPAIR Bilateral 8-10 years ago    Pulled bilateral biceps off bone from lifting weights. Social History:    Social History     Tobacco Use    Smoking status: Every Day     Packs/day: 1.50     Years: 50.00     Pack years: 75.00     Types: Cigarettes    Smokeless tobacco: Never    Tobacco comments:     pt smokes 1 pk and a half a day    Substance Use Topics    Alcohol use: No     Comment: no longer drinks                                Ready to quit: Not Answered  Counseling given: Not Answered  Tobacco comments: pt smokes 1 pk and a half a day       Vital Signs (Current): There were no vitals filed for this visit.                                            BP Readings from Last 3 Encounters:   01/27/23 (!) 156/84   12/21/22 (!) 170/100   09/21/22 139/89       NPO Status:                                                                                 BMI:   Wt Readings from Last 3 Encounters:   01/27/23 197 lb (89.4 kg)   12/21/22 193 lb (87.5 kg)   09/21/22 194 lb (88 kg)     There is no height or weight on file to calculate BMI.    CBC:   Lab Results   Component Value Date/Time    WBC 11.2 01/31/2023 10:56 AM    RBC 5.01 01/31/2023 10:56 AM    HGB 14.9 01/31/2023 10:56 AM    HCT 47.4 01/31/2023 10:56 AM    MCV 94.7 01/31/2023 10:56 AM    RDW 14.4 01/31/2023 10:56 AM     01/31/2023 10:56 AM       CMP:   Lab Results   Component Value Date/Time     12/21/2022 05:08 PM    K CANCELED 12/21/2022 05:08 PM    K 2.8 03/15/2022 06:00 PM     12/21/2022 05:08 PM    CO2 16 12/21/2022 05:08 PM    BUN 21 12/21/2022 05:08 PM    CREATININE 1.72 12/21/2022 05:08 PM    GFRAA 38 06/15/2022 11:59 AM    GFRAA >60 06/11/2013 05:29 PM    AGRATIO 1.7 12/21/2022 05:08 PM    LABGLOM 32 06/15/2022 11:59 AM    GLUCOSE CANCELED 12/21/2022 05:08 PM    PROT 7.1 12/21/2022 05:08 PM    PROT 7.1 08/15/2012 06:38 PM    CALCIUM 9.6 12/21/2022 05:08 PM    BILITOT <0.2 12/21/2022 05:08 PM    ALKPHOS 99 12/21/2022 05:08 PM    AST 13 12/21/2022 05:08 PM    ALT 11 12/21/2022 05:08 PM       POC Tests: No results for input(s): POCGLU, POCNA, POCK, POCCL, POCBUN, POCHEMO, POCHCT in the last 72 hours. Coags:   Lab Results   Component Value Date/Time    PROTIME 10.7 06/12/2019 12:49 PM    INR 0.94 06/12/2019 12:49 PM    APTT 30.3 06/12/2019 12:49 PM       HCG (If Applicable): No results found for: PREGTESTUR, PREGSERUM, HCG, HCGQUANT     ABGs: No results found for: PHART, PO2ART, HEY0ZZN, GAS4XVC, BEART, T0VHKTRF     Type & Screen (If Applicable):  No results found for: LABABO, LABRH    Drug/Infectious Status (If Applicable):  No results found for: HIV, HEPCAB    COVID-19 Screening (If Applicable):   Lab Results   Component Value Date/Time    COVID19 Not Detected 03/18/2022 12:07 PM           Anesthesia Evaluation  Patient summary reviewed and Nursing notes reviewed no history of anesthetic complications:   Airway: Mallampati: II  TM distance: >3 FB   Neck ROM: full  Mouth opening: > = 3 FB   Dental:    (+) upper dentures  Comment: Upper full plate  Lower teeth in poor condition-some are  missing    Pulmonary:   (+) COPD:  current smoker    (-) pneumonia, asthma, shortness of breath, recent URI and sleep apnea          Patient did not smoke on day of surgery.                 ROS comment: 75 pack year smoking history   Cardiovascular:  Exercise tolerance: poor (<4 METS),   (+) hypertension:, dysrhythmias: atrial fibrillation, CHF:,     (-) pacemaker, valvular problems/murmurs, past MI, CAD, CABG/stent,  angina, orthopnea, PND,  MONTERO, no pulmonary hypertension and no hyperlipidemia      Rhythm: regular                   ROS comment: March 2022:  Normal left ventricular size and wall thickness with severely reduced   function. Ejection fraction is visually estimated to be <20%. Regional wall motion abnormalities are noted. Ascending aorta has trivial plaque noted. Normal right ventricular size and function. Left atrial appendage, left atrium and left ventricle visualized with no   evidence of thrombus. Ascending aorta has mild atherosclerotic plaque noted. There are no significant valvular abnormalities appreciated ion this study. Neuro/Psych:   (+) neuromuscular disease:, psychiatric history:   (-) seizures, TIA, CVA, headaches and depression/anxiety            GI/Hepatic/Renal:   (+) GERD:,      (-) hepatitis, liver disease, no renal disease, bowel prep and no morbid obesity       Endo/Other:    (+) blood dyscrasia (eliquis)::., no malignancy/cancer. (-) diabetes mellitus, hypothyroidism, hyperthyroidism, arthritis, no electrolyte abnormalities, no malignancy/cancer               Abdominal:             Vascular:     - PVD, DVT and PE. Other Findings: Xanthelasma present            Anesthesia Plan      general     ASA 3     (Continues to smoke)  Induction: intravenous. MIPS: Postoperative opioids intended and Prophylactic antiemetics administered. Anesthetic plan and risks discussed with patient. Plan discussed with CRNA. Franklin Romero MD   2/3/2023        This pre-anesthesia assessment may be used as a history and physical.    DOS STAFF ADDENDUM:    Pt seen and examined, chart reviewed (including anesthesia, drug and allergy history). No interval changes to history and physical examination. Anesthetic plan, risks, benefits, alternatives, and personnel involved discussed with patient. Patient verbalized an understanding and agrees to proceed.       Toñito MONTAÑO Gudelia Trejo MD  February 3, 2023  6:59 AM

## 2023-02-03 NOTE — PROCEDURES
[facility]     Electrophysiology Procedure Note       Date of Procedure: 2/3/2023  Patient's Name: Ana Noble  YOB: 1955   Medical Record Number: [id number]  Referring Physician: Maynor Tamayo MD  Procedure Performed by: Josefa Tavares MD    Procedure performed:  Electrophysiology study with radiofrequency ablation of atrial fibrillation and pulmonary vein isolation of the left vein, the right was intact. Additional ablation of premature atrial contractions that could initiate and perpetuate atrial fibrillation x3 different areas. Additional ablation of the proximal CTI line that had reconnected until a bidirectional block was obtained. 3-D electroanatomical mapping of the left atrium    Transseptal puncture through an intact septum using versacross under intracardiac ultrasound guidance without fluoroscopic guidance   Intracardiac echocardiography  Left ventricular pacing and recording  Drug infusion with an attempt to induce atrial tachydysrhythmia  Anesthesia: General anesthesia provided by the Anesthesia service    Indications for procedure: Ana Noble is a 78 y.o. male who has a history of persistent  atrial fibrillation and flutter with tachycardia induced cardiomyopathy, previously ablated 6/2022 who had recurrence of atrial fibrillation after stopping AAD with symptoms of dyspnea with minimal exertion and fatigue who is now here for an re-ablation for paroxysmal atrial fibrillation. Details of Procedure: The risks, benefits and alternatives of the ablation procedure were discussed with the patient.  The risks including, but not limited to, the risks of bleeding, infection, radiation exposure, injury to vascular, cardiac and surrounding structures (including pneumothorax), stroke, cardiac perforation, tamponade, need for emergent open heart surgery, need for pacemaker implantation, esophageal injury and fistula, myocardial infarction and death were discussed in detail. The patient opted to proceed with the ablation. Written informed consent was signed and placed in the chart. Patient was brought to the EP lab in a fasting non-sedate state. Patient underwent general anesthesia by anesthesia team. We initially performed a transesophageal echo that showed no left atrial appendage clot/thrombus. Procedure was done with use of minimal fluoroscopy for esosure placement. Both groins were prepped in a sterile fashion. We gained access in Right femoral vein. A 8-Bulgarian sheath for ICE and 6F sheath for CS catheter and an agilis sheath for ablation and mapping catheters were  placed in the right femoral vein using modified seldinger technique. Ultrasound was used for femoral venous access. We gained access modified Seldinger technique and ultrasound guidance. The femoral vein was identified with real time visualization of needle passage to the venous lumen. Using 3-D mapping system Carto, the CS catheter was placed inside the coronary sinus  for recording and mapping of the left atrium. Using ICE we delineated the left pulmonary vein, left atrial appendage,  mitral valve, and right superior and right inferior pulmonary veins. Patient received a bolus of heparin prior transseptal puncture followed by continuous monitoring of the ACT and boluses of heparin during the procedure to keep the ACT between 350-400. Also an esophageal temperature probe in addition to Esophastar catheter was advanced into the esophagus for mapping of the esophagus and careful monitoring of the esophageal temperature during ablation. A transseptal puncture through intact septum was done using ICE and  pressure monitoring. The intraatrial septum was lipomatous with prominent limbus above and below. Versacross needle inside the baylis sheath was used for the transseptal puncture. The baylis sheath was advanced and left inside the left atrium.      The left atrium pressure was 42/11 (23) mmHg    Then a pentaray catheter with deflectable curve and an irrigated ablation catheter was advanced into the left atrium sequentially and alternatively as needed. Using Penta ray and Carto navigation system a three dimensional electro anatomical mapping of the left atrium, in addition to right and left sided pulmonary vein was created. Using Penta ray catheter voltage mapping of the left atrium was created. Maps below. The right pulmonary veins were still isolated but the left pulmonary vein at the antrum and nena had signals. Also an esophageal temperature probe in addition to Esophastar catheter was advanced into the esophagus for mapping of the esophagus and careful monitoring of the esophageal temperature during ablation. The ICE was used to monitor location of temperature probe and move it close to ablation area. We stopped ablation when  temperature  increased 1 degree. Then wide area circumferential ablation for left sided pulmonary veins were performed. Linear RF lesions was placed around both superior and inferior pulmonary vein in eft side well outside the pulmonary vein ostium till all two pulmonary veins were isolated. The power was limited to 28  W on the posterior wall and careful monitoring of esophageal temperature was done to prevent injury to esophagus and lesions near esophagus were given only for 10 seconds. Elsewhere power was 36 W as needed. (posterior)-400(anterior). Nena lesions at the left pulmonary veins were also done. Esosure was used to move the esophagus away from the left posterior PV for safe delivery of effective lesions with careful monitoring of esophageal temperature. It did not rise more than 0.3 degrees celsius. After isolating the pulmonary veins, there were three different types of PACs noted in fractionated areas.  These are a separate mechanism that could initiate and/or perpetuate atrial fibrillation apart form the pulmonary vein fascicles. The first PAC, distal to proximal activation was mapped to the anterior base of the appendage. This was ablated without recurrence. The second premature atrial contraction, with a straight up and down activation was mapped using activation mapping and was located on the roof close to the superior pulmonary vein WACA. This was ablated. The third PAC, with a proximal to distal activation, was mapped to the right atrium by the lateral selina terminalis. This was ablated with brief runs of atrial fibrillation that terminated as the ablation was continued. The previous CTI line for right sided flutter was checked. This had reconnected the the proximal end where there is a very prominent, C shaped eustachian ridge. Mapping along the line showed this site to be the earliest. Ablation in this region caused a block, that was confirmed bidirectionally. We then performed an EP study and programmed stimulation using our CS catheter and ablation catheter to assess the cardiac conduction system and to attempt to induce atrial tachydysrhythmia. The ablation catheter were moved from the left atrium to the left ventricular apex and His bundle position. His bundle potentials was recorded and pacing and recordings were performed from right atrium, coronary sinus and LV apex with the following results:     Sinus cycle length was 551 msec  OK interval was 129 msec  QRS duration was 90 msec  QT interval was 380 msec  AH interval was 63 msec  HV interval was 48 msec  Pacing from left atrium, 1:1 conduction over AV node with (AV wenckebach) was 310  msec  Pacing from left atrium, AV frank ERP was 600/350 msec   Pacing from LV apex, 1:1 retrograde conduction over AV node (VA wenckebach) was 540 msec    After ablation was complete, catheters were placed in the left and right atrium, His-position, right ventricle, and left ventricle for pacing and recording.  Isuprel titrated to at least 25% increase in HR was started. Other Arrhythmias were attempted to be induced by rapid atrial and ventricular pacing, and there was no induction of any other atrial tachydysrhythmia. Then both the ablation and CS catheters were removed from the body. ICE was then used to check if there was new or change in pericardial effusion. Trivial noted before starting procedure with no change at the end. ICE catheter was then removed. All the 3 sheaths were removed from the right femoral vein. Since patient was on anticoagulation with heparin with high ACT, we used Verscade MVP devices for closure of access sites on the right femoral vein. 30 mg of protamine was given to partially reverse the ACT. The patient tolerated the procedure well and there were no complications. Patient was extubated and transferred to the floor in stable condition. Blood loss <20 cc. No complication     Conclusion:      Successful left Pulmonary vein re-isolations using wide area circumferential radiofrequency ablation with confirmation of exit and entrance block. Successful ablation of PACs in the left and right atrium. Successful CTI line re-ablation where it was reconnected on the proximal end. Plan:   The patient will go for recovery and will receive usual post ablation care. Discharge home later today if still doing well. Pantoprazole for 4 weeks. Amiodarone for 3 months, continue the rest of the medications. If has pleuritic chest pain tomorrow, the patient or family member will call us so that we send colchicine prescription. Follow up with me in 1 month then with Remington Chi NP in 3 months. Thank you for allowing us to participate in the care of your patient. If you have any questions or concerns, please do not hesitate to contact me.     Elias Simeon MD  Cardiac Electrophysiology  [facility]             For [facility]      Straight up and down PAC      Proximal to distal PACs

## 2023-02-03 NOTE — H&P
H&P Update    I have reviewed the history and physical from 2023 and examined the patient and find no relevant changes. I have reviewed with the patient and/or family the risks, benefits, and alternatives to the procedure. Pre-sedation Assessment    Patient:  Yolanda Sun   :   1955    Intended Procedure: Redo atrial fibrillation ablation and check CTI for flutter. Nurses notes reviewed and agreed. Medications reviewed  Allergies:    Allergies   Allergen Reactions    Atorvastatin-Coenzyme Q10 Diarrhea    Lodine [Etodolac]     Neurontin [Gabapentin]      edema    Pregabalin Swelling    Vioxx Diarrhea    Wellbutrin [Bupropion Hcl]      Pre-Procedure Assessment/Plan:  ASA 3 - Patient with moderate systemic disease with functional limitations    Level of Sedation Plan: per anesthesia    Post Procedure plan: Return to same level of care    Waldemar Nguyen MD  Cardiac Electrophysiology  [facility]

## 2023-02-03 NOTE — DISCHARGE INSTRUCTIONS
CARDIAC ABLATION    Care of your puncture site:  Remove bandage 24 hours after the procedure. 2023  May shower in 24 hours but do not sit in a bathtub/pool of water for 5 days or until the wound is healed. Gently clean groin using soap and water. Dry thoroughly and apply a Band-Aid that covers the entire site. Use Band-Aid until skin heals over in about 3-5 days. Do not apply powder or lotion. Limit walking and stair climbing today. Normal Observations:  Soreness or tenderness which may last one week. Mild oozing from the incision site. Possible bruising that could last 2 weeks. Activity:  You may resume driving 24 hours following the procedure. Do not make important / legal decisions within 24 hours after procedure. Do not drink alcoholic beverages or take any sleeping pills for 24 hours. You may resume normal activity in 3 days or after the wound heals. Avoid lifting more than 10 pounds for 3 days or until the wound heals. Avoid strenuous exercise or activity for 1 week. Nutrition:  Regular diet or previous diet. Call your doctor immediately if your condition worsens, for any other concerns, for a follow-up appointment or if you experience any of the [phone]  Increased swelling on the groin or leg. Unusual pain, numbness, or tingling of the groin or down the leg. Any signs of infection such as: redness, yellow drainage at the site, swelling or pain.     IF GROIN STARTS BLEEDING SIGNIFICANTLY:   LAY FLAT, HOLD FIRM DIRECT PRESSURE, AND CALL 911

## 2023-02-03 NOTE — PROGRESS NOTES
PT received from cath lab. pt placed on monitor, pt presents with NS rythm. pt on 4L of oxygen via NC. pt ne score is 9/10 at the time of handoff. handoff preformed with CATH LAB RN.

## 2023-02-28 ENCOUNTER — TELEPHONE (OUTPATIENT)
Dept: CASE MANAGEMENT | Age: 68
End: 2023-02-28

## 2023-02-28 NOTE — TELEPHONE ENCOUNTER
Certified Lung Cancer Screening Reminder Recommendation letter mailed to patient, this is patients 3rd & final reminder letter. Patients ordering provider, Dr Eve Key, notified via EMR direct message, verification received. Most recent smoking history reviewed and ALA smoking cessation [id number] [facility] [address] class information mailed to patient.

## 2023-03-03 ENCOUNTER — OFFICE VISIT (OUTPATIENT)
Dept: CARDIOLOGY CLINIC | Age: 68
End: 2023-03-03
Payer: MEDICARE

## 2023-03-03 VITALS
WEIGHT: 197 LBS | HEIGHT: 68 IN | HEART RATE: 64 BPM | DIASTOLIC BLOOD PRESSURE: 84 MMHG | SYSTOLIC BLOOD PRESSURE: 136 MMHG | OXYGEN SATURATION: 95 % | BODY MASS INDEX: 29.86 KG/M2

## 2023-03-03 DIAGNOSIS — I10 PRIMARY HYPERTENSION: ICD-10-CM

## 2023-03-03 DIAGNOSIS — I48.19 PERSISTENT ATRIAL FIBRILLATION (HCC): Primary | ICD-10-CM

## 2023-03-03 DIAGNOSIS — I50.22 CHRONIC SYSTOLIC HEART FAILURE (HCC): ICD-10-CM

## 2023-03-03 DIAGNOSIS — F17.200 SMOKING ADDICTION: ICD-10-CM

## 2023-03-03 PROCEDURE — 1123F ACP DISCUSS/DSCN MKR DOCD: CPT | Performed by: INTERNAL MEDICINE

## 2023-03-03 PROCEDURE — 3078F DIAST BP <80 MM HG: CPT | Performed by: INTERNAL MEDICINE

## 2023-03-03 PROCEDURE — 99215 OFFICE O/P EST HI 40 MIN: CPT | Performed by: INTERNAL MEDICINE

## 2023-03-03 PROCEDURE — G8484 FLU IMMUNIZE NO ADMIN: HCPCS | Performed by: INTERNAL MEDICINE

## 2023-03-03 PROCEDURE — G8417 CALC BMI ABV UP PARAM F/U: HCPCS | Performed by: INTERNAL MEDICINE

## 2023-03-03 PROCEDURE — G8427 DOCREV CUR MEDS BY ELIG CLIN: HCPCS | Performed by: INTERNAL MEDICINE

## 2023-03-03 PROCEDURE — 93000 ELECTROCARDIOGRAM COMPLETE: CPT | Performed by: INTERNAL MEDICINE

## 2023-03-03 PROCEDURE — 3017F COLORECTAL CA SCREEN DOC REV: CPT | Performed by: INTERNAL MEDICINE

## 2023-03-03 PROCEDURE — 4004F PT TOBACCO SCREEN RCVD TLK: CPT | Performed by: INTERNAL MEDICINE

## 2023-03-03 PROCEDURE — 3074F SYST BP LT 130 MM HG: CPT | Performed by: INTERNAL MEDICINE

## 2023-03-03 NOTE — PROGRESS NOTES
Cardiac Electrophysiology Consultation   Date: 3/3/2023   Reason for Consultation: Atrial fibrillation  Consult Requesting Physician: Steph Cooney MD  Primary Care Physician: Kaiden Valles MD     Chief Complaint:   Chief Complaint   Patient presents with    Follow-up     Ablation  follow up     HPI: Donaldo Dillard is a 78 y.o. patient with a history of smoker (1.5 PPD) COPD, HTN, and paroxysmal atrial fibrillation. He was first diagnosed with atrial fibrillation during 09/2022 hospitalization at [facility]. He was hospitalized at [facility] from 3/15-3/21/2022 after reporting symptoms of bilateral lower extremity swelling and shortness of breath. EKG showed atrial fibrillation with RVR. Echocardiogram showed globally low LVEF. Upon assessment new onset tachycarida induced cardiomyopathy. He had unsuccessful DCCV x3 in hospital. Amiodarone loading was started and scheduled for outpatient DCCV. He underwent successful cardioversion to NSR with Dr. Steph Cooney MD on 4/14/2022. Later underwent atrial fibrillation and typical atrial flutter ablation with myself on 6/20/22. LVEF improved and normalized. NHAN 12/20/2023- 01/18/2023 showed recurrence of atrial fibrillation with increased burden. The event monitor reported 93% burden but it is was not accurate. He has frequent PACs but also has atrial fibrillation. He was seen in office on 01/27/2023 ECG showed sinus but with frequent PACS and atrial runs. Given increasing episodes off amiodarone and starting to feel SOB as initial presentation as well as hx of tachycardia induced cardiomyopathy, he opted  for repeat ablation. Patient had restarted taking his amiodarone 10 days prior to his office visit. On 02/03/2023 he underwent radiofrequency ablation of atrial fibrillation and pulmonary vein isolation of the left vein, the right was intact. Additional ablation of premature atrial contractions that could initiate and perpetuate atrial fibrillation x3 different areas. Additional ablation of the proximal CTI line that had reconnected until a bidirectional block was obtained 02/23/2023. He was continued on amiodarone 200 mg daily. Interval History: Today, he presents to office for one month follow up s/p ablation. He reports the he is feeling much better since the ablation. Reports continued shortness of breath with exertion which he attributes to his COPD. Reports compliance with medications and tolerating them well. Denies chest pain/pressure, tightness, edema, heart racing, palpitations, lightheadedness, dizziness, syncope, presyncope,  PND or orthopnea. Past Medical History:   Diagnosis Date    Chronic pain syndrome     Chronic pain syndrome     COPD (chronic obstructive pulmonary disease) ([facility] [address] 6/5/2012    DDD (degenerative disc disease), lumbar     Depression 6/5/2012    Diverticulitis 6/5/2012    DJD (degenerative joint disease) of knee     Foraminal stenosis of lumbar region     LORIE (generalized anxiety disorder) 6/5/2012    GERD (gastroesophageal reflux disease) 6/5/2012    HTN (hypertension) 6/5/2012    Incidental lung nodule     Insomnia     Lumbar radiculitis     Neuropathy 6/5/2012    Nontraumatic rupture of tendons of biceps (long head) 6/5/2012    Numbness and tingling of both legs     Pernicious anemia 6/5/2012    Sciatica     Sprain and strain of unspecified site of elbow and forearm 6/5/2012      Past Surgical History:   Procedure Laterality Date    CARDIOVERSION  04/14/2022    MUSCLE REPAIR Bilateral 8-10 years ago    Pulled bilateral biceps off bone from lifting weights. Allergies: Allergies   Allergen Reactions    Atorvastatin-Coenzyme Q10 Diarrhea    Lodine [Etodolac]     Neurontin [Gabapentin]      edema    Pregabalin Swelling    Vioxx Diarrhea    Wellbutrin [Bupropion Hcl]        Medication:   Prior to Admission medications    Medication Sig Start Date End Date Taking?  Authorizing Provider   metoprolol succinate (TOPROL XL) 50 MG extended release tablet TAKE 1/2 TABLET BY MOUTH DAILY 2/22/23  Yes Mary Beverly MD   amiodarone (CORDARONE) 200 MG tablet TAKE ONE TABLET BY MOUTH DAILY 1/19/23  Yes Trudy Layton MD   albuterol sulfate HFA (VENTOLIN HFA) 108 (90 Base) MCG/ACT inhaler Inhale 2 puffs into the lungs 4 times daily as needed for Wheezing 12/21/22  Yes Mary Beverly MD   omeprazole (PRILOSEC) 40 MG delayed release capsule 1 tab po daily 12/21/22  Yes Mary Beverly MD   ezetimibe (ZETIA) 10 MG tablet Take 1 tablet by mouth daily 12/21/22  Yes Mary Beverly MD   apixaban (ELIQUIS) 5 MG TABS tablet Take 1 tablet by mouth 2 times daily 12/21/22  Yes Mary Beverly MD   torsemide (DEMADEX) 20 MG tablet Take 1 tablet by mouth daily 12/21/22  Yes Mary Beverly MD   sildenafil (VIAGRA) 100 MG tablet Take 1 tablet by mouth daily as needed for Erectile Dysfunction 12/21/22  Yes Mary Beverly MD   amLODIPine (NORVASC) 5 MG tablet Take 1 tablet by mouth daily 12/21/22  Yes Mary Beverly MD   budesonide-formoterol (SYMBICORT) 160-4.5 MCG/ACT AERO Inhale 2 puffs into the lungs 2 times daily 9/21/22  Yes Mary Beverly MD   ipratropium-albuterol (DUONEB) 0.5-2.5 (3) MG/3ML SOLN nebulizer solution Inhale 3 mLs into the lungs every 4 hours 9/21/22  Yes Mary Beverly MD   colchicine (COLCRYS) 0.6 MG tablet Take 1 tablet by mouth daily for 7 days 2/3/23 2/10/23  GUICHO Daly - CNP       Social History:   reports that he has been smoking cigarettes. He has a 75.00 pack-year smoking history. He has never used smokeless tobacco. He reports current drug use. Drug: Marijuana Marissa Kalen). He reports that he does not drink alcohol. Family History:  family history includes Arthritis in his father; Heart Disease in his father; Other in his father. Reviewed.  Denies family history of sudden cardiac death, arrhythmia, premature CAD    Review of System:  Pertinent positive and negatives are in the HPI, the rest are negative. Physical Examination:  /84 (Site: Right Upper Arm, Position: Sitting)   Pulse 64   Ht 5' 8\" (1.727 m)   Wt 197 lb (89.4 kg)   SpO2 95%   BMI 29.95 kg/m²      Constitutional: Oriented. No distress. Head: Normocephalic and atraumatic. Mouth/Throat: Oropharynx is clear and moist.   Eyes: Conjunctivae normal. EOM are normal.   Neck: Normal range of motion. Neck supple. No rigidity. No JVD present. Cardiovascular: Normal rate, regular rhythm, S1&S2 and intact distal pulses. Pulmonary/Chest: Bilateral respiratory sounds. No wheezes. No rhonchi. Abdominal: Soft. Bowel sounds present. No distension, No tenderness. Musculoskeletal: No tenderness. No edema    Lymphadenopathy: Has no cervical adenopathy. Neurological: Alert and oriented. Cranial nerve appears intact, No Gross deficit   Skin: Skin is warm and dry. No rash noted. Psychiatric: Has a normal mood, affect and behavior     Labs:  Reviewed. ECG: reviewed, NSR, with v-rate of 78 bpm with QRS duration 104 ms. No ventricular pre-excitation, or QT prolongation. Studies:   1. Event monitor: 30 day monitor 12/20/2023- 01/18/2023  Predominantly atrial fibrillation with 93% burden (exaggerated the burden is much lower), several symptomatic episodes  Some periods of sinus rhythm    2.   Echo: 09/16/22   Normal left ventricle size and systolic function with an estimated ejection   fraction of 55-60%. No regional wall motion abnormalities are seen. There is   mildly increased left ventricular wall thickness. Diastolic filling   parameters suggest grade II diastolic dysfunction. Normal right ventricular size and function. The left atrium is moderately dilated. Trivial mitral and tricuspid regurgitation. Echo: 3/15/2022  Summary  Left ventricular cavity size is normal.  Normal left ventricular wall thickness. Ejection fraction is severely visually estimated to be 20%.   Regional wall motion abnormalities are noted. Indeterminate diastolic function. The left atrium is moderately dilated. Normal right ventricular size. Right ventricular systolic function is moderately-severely reduced. Mild tricuspid regurgitation. .  Estimated pulmonary artery systolic pressure is normal at 32 mmHg assuming a right atrial pressure of 8 mmHg. Echo 2/9/2018:  Left ventricular size is mildly increased. Normal left ventricular wall thickness. Left ventricular function is normal with ejection fraction estimated at 55-60 %. There is reversal of E/A inflow velocities across the mitral valve. Mild thickening of anterior leaflet of mitral valve. Trivial mitral regurgitation is present. Trivial tricuspid regurgitation. Trivial pulmonic regurgitation present. 3. Stress Test:  2/9/2018 Stress Myoview:   Baseline ECG makes stress test difficult to interpret but no clear ischemic   ECG changes were seen   frequent PVC developing during the exercise phase   Poor exercise capacity    4. Cath: 2/14/2015 [facility]     LEFT VENTRICULOGRAM:  Reveals a normal left ventricular systolic function with an estimated EF of 50% to 55%. OVERALL IMPRESSION:    1. Patent, nondominant right coronary artery. 2.  Patent left main trunk. 3.  Patent left anterior descending artery and its branches. 4.  Patent dominant circumflex artery and its branches. 5.  Normal left ventricular systolic function. 6.  Patent right femoral, common femoral artery. I independently reviewed the ECG, MCOT, echocardiogram, stress test, and coronary angiography/PCI results and used them for my plan of care.     - Patient has a [id number] Score 3 (CHF, HTN, AGE)     Risk   Factors  Component Value   C CHF Yes 1   H HTN Yes 1   A2 Age >= 76 No,  (69 y.o.) 0   D DM No 0   S2 Prior Stroke/TIA No 0   V Vascular Disease No 0   A Age 74-69 Yes,  (69 y.o.) 1   Sc Sex male 0    [id number]  Score  3   Score last updated 1/26/23 1:12 PM EST    Click here for a link to the UpToDate guideline \"Atrial Fibrillation: Anticoagulation therapy to prevent embolization    Disclaimer: Risk Score calculation is dependent on accuracy of patient problem list and past encounter diagnosis. .   Procedures:  1. Unsuccessful attempt x3 at synchronized cardioversion from atrial  fibrillation with rapid ventricular response to a normal sinus rhythm with Dr. Rob Joshi MD on 3/28/2022.  2. Successful external DC cardioversion of atrial fibrillation with Dr. Cecy Sy MD on 4/14/2022.  3. Electrophysiology study with radiofrequency ablation of atrial fibrillation and pulmonary vein isolation and additional ablation of typical trial flutter 6/20/2022.  4. Electrophysiology study with radiofrequency ablation of atrial fibrillation and pulmonary vein isolation of the left vein, the right was intact. Additional ablation of premature atrial contractions that could initiate and perpetuate atrial fibrillation x3 different areas. Additional ablation of the proximal CTI line that had reconnected until a bidirectional block was obtained 02/23/2023    Assessment/Plan:     Persistent atrial fibrillation/typical flutter   - first diagnosed with atrial fibrillation during hospitalization at [facility] 03/2022   - s/p DIANA and DCCV x3 (unsuccessfully). -s/p successful CV to NSR on 4/14/2022.   -s/p RFA of atrial fibrillation and typical atrial flutter on 6/20/2022.     -He has a [id number] Score 3 (CHF, HTN, AGE)    -Continue Eliquis 5 mg BID for  thromboembolic risk reduction.   -Tolerating well no signs or symptoms of abnormal bruising or bleeding.   - Continue Toprol XL to 50 mg qd   - NHAN 12/20/2023- 01/18/2023 showed recurrence of atrial fibrillation with increased burden.   -s/p RFAof atrial fibrillation and pulmonary vein isolation of the left vein, the right was intact. Additional ablation of premature atrial contractions that could initiate and perpetuate atrial fibrillation x3 different areas. Additional ablation of the proximal CTI line that had reconnected until a bidirectional block was obtained on 02/23/2023. Mr. Zachariah Alonso has recurrent atrial fibrillation after ablation with SOB for which he underwent redo ablation. Feels better today with resolution of exertional SOB. Cotninue amiodarone. In two months, can stop amiodarone and start flecainide 50 mg bid or 100 mg bid depending on HR. Chronic systolic heart failure  HFrEF - severely depressed LV systolic function - Recovered after ablation. Likely tachycardia mediated. -NYHA class III   -LVEF <20 % per Echo 3/15/2022, recovered at 55-65% per Echo 09/16/2022              -Depressed systolic function presumed to be rate related. -Continue guideline directed medical therapy. Beta Blocker: Metoprolol succinate 50 mg daily. Diuretic: Torsemide 20 mg daily,               ACE/ARB/ ARNI: Amlodipine 5 mg daily  -Euvolemic on today's exam.       Primary hypertension   -Not controlled. -Goal BP < 130/80  -continue medical management. Smoking addiction  -Smoking cessation discussed over 5 minutes. The harmful effects of smoking including cancer and CAD were extensively discussed. Follow up in two months with Susi Lewis CNP. Thank you for allowing me to participate in the care of Gabby Farias. All questions and concerns were addressed to the patient/family. Alternatives to my treatment were discussed. This note was scribed in the presence of Sarahi Lees MD by Salo Mcmanus RN. Physician attestation: The scribe's documentation has been prepared under my direction and has been personally reviewed by me in its entirety. I confirm that the note above reflects all work, treatment, procedures, and medical decision making performed by me.      Sarahi Lees MD  Cardiac Electrophysiology  [facility]

## 2023-03-13 ENCOUNTER — TELEPHONE (OUTPATIENT)
Dept: CASE MANAGEMENT | Age: 68
End: 2023-03-13

## 2023-03-31 ENCOUNTER — HOSPITAL ENCOUNTER (OUTPATIENT)
Dept: CT IMAGING | Age: 68
Discharge: HOME | End: 2023-03-31
Payer: MEDICARE

## 2023-03-31 DIAGNOSIS — F17.200 CURRENT SMOKER: ICD-10-CM

## 2023-03-31 PROCEDURE — 71271 CT THORAX LUNG CANCER SCR C-: CPT

## 2023-04-04 ENCOUNTER — TELEPHONE (OUTPATIENT)
Dept: CASE MANAGEMENT | Age: 68
End: 2023-04-04

## 2023-04-04 NOTE — TELEPHONE ENCOUNTER
CT Lung Cancer Screening completed on 3/31/2023, ordering provider, Dr Sergio Quesada, routed radiology results with recommendations. Smoking history reviewed.

## 2023-04-17 RX ORDER — AMIODARONE HYDROCHLORIDE 200 MG/1
TABLET ORAL
Qty: 90 TABLET | Refills: 3 | Status: SHIPPED | OUTPATIENT
Start: 2023-04-17

## 2023-04-17 NOTE — TELEPHONE ENCOUNTER
Last OV: 3/3/23  Next OV: 5/4/23  Last refill: 1/19/23  #90  Most recent Labs: 2/3/23- BMP, 12/21/22- LIPID, TSH  Last EKG (if needed): 3/3/23

## 2023-05-02 NOTE — PROGRESS NOTES
[facility]   Electrophysiology      Date: 5/4/2023    Primary Cardiologist: Mann Alicia MD  PCP: Edwin Choi MD     Chief Complaint:   Chief Complaint   Patient presents with    Follow-up     Ablation follow up. PT reports no issues at this time. History of Present Illness:    I saw Cassius Stinson in the office for electrophysiology follow up today. He is a 78 y.o. male with a past medical history of smoker (1.5 PPD) COPD, HTN, and paroxysmal atrial fibrillation. He was first diagnosed with atrial fibrillation during the recent hospitalization at [facility]. He was hospitalized at [facility] from 3/15-3/21/2022 after presenting to the [facility] ED  reporting symptoms of bilateral lower extremity swelling and shortness of breath and EKG showed atrial fibrillation with RVR. Echocardiogram showed globally low LVEF. It was felt that he had new onset tachycarida induced cardiomyopathy. He had unsuccessful DCCV x3 in hospital. Amiodarone loading was started and scheduled for outpatient DCCV. He underwent successful cardioversion to NSR with Dr. Mann Alicia MD on 4/14/2022. Later underwent atrial fibrillation and typical atrial flutter ablation with Dr. Saba Escobedo on 6/20/22. He had a 30-day monitor in December 2022 which showed predominantly atrial fibrillation. He was started back on amiodarone. He ultimately underwent repeat atrial fibrillation ablation, ablation of PACs and typical atrial flutter ablation on 2/3/2023 with Dr. Saba Escobedo. He presents today for follow-up. He has been feeling fairly well since his follow-up. He denies any increased shortness of breath compared to baseline. No palpitations. No chest pain. He does have some ankle swelling that is worse throughout the day and is better in the morning. Denies any syncope. No bleeding problems. Allergies:   Allergies   Allergen Reactions    Atorvastatin-Coenzyme Q10 Diarrhea    Lodine [Etodolac]     Neurontin [Gabapentin]

## 2023-05-04 ENCOUNTER — OFFICE VISIT (OUTPATIENT)
Dept: CARDIOLOGY CLINIC | Age: 68
End: 2023-05-04
Payer: MEDICARE

## 2023-05-04 VITALS
OXYGEN SATURATION: 99 % | HEART RATE: 71 BPM | DIASTOLIC BLOOD PRESSURE: 82 MMHG | SYSTOLIC BLOOD PRESSURE: 130 MMHG | WEIGHT: 192 LBS | BODY MASS INDEX: 29.1 KG/M2 | HEIGHT: 68 IN

## 2023-05-04 DIAGNOSIS — I50.22 CHRONIC SYSTOLIC HEART FAILURE (HCC): ICD-10-CM

## 2023-05-04 DIAGNOSIS — I48.3 TYPICAL ATRIAL FLUTTER (HCC): ICD-10-CM

## 2023-05-04 DIAGNOSIS — I10 PRIMARY HYPERTENSION: ICD-10-CM

## 2023-05-04 DIAGNOSIS — I42.8 NONISCHEMIC CARDIOMYOPATHY (HCC): ICD-10-CM

## 2023-05-04 DIAGNOSIS — I48.19 PERSISTENT ATRIAL FIBRILLATION (HCC): Primary | ICD-10-CM

## 2023-05-04 PROCEDURE — G8417 CALC BMI ABV UP PARAM F/U: HCPCS | Performed by: NURSE PRACTITIONER

## 2023-05-04 PROCEDURE — G8427 DOCREV CUR MEDS BY ELIG CLIN: HCPCS | Performed by: NURSE PRACTITIONER

## 2023-05-04 PROCEDURE — 93000 ELECTROCARDIOGRAM COMPLETE: CPT | Performed by: NURSE PRACTITIONER

## 2023-05-04 PROCEDURE — 3079F DIAST BP 80-89 MM HG: CPT | Performed by: NURSE PRACTITIONER

## 2023-05-04 PROCEDURE — 1123F ACP DISCUSS/DSCN MKR DOCD: CPT | Performed by: NURSE PRACTITIONER

## 2023-05-04 PROCEDURE — 3017F COLORECTAL CA SCREEN DOC REV: CPT | Performed by: NURSE PRACTITIONER

## 2023-05-04 PROCEDURE — 3075F SYST BP GE 130 - 139MM HG: CPT | Performed by: NURSE PRACTITIONER

## 2023-05-04 PROCEDURE — 4004F PT TOBACCO SCREEN RCVD TLK: CPT | Performed by: NURSE PRACTITIONER

## 2023-05-04 PROCEDURE — 99214 OFFICE O/P EST MOD 30 MIN: CPT | Performed by: NURSE PRACTITIONER

## 2023-05-04 ASSESSMENT — ENCOUNTER SYMPTOMS
BACK PAIN: 0
WHEEZING: 0
SORE THROAT: 0
TROUBLE SWALLOWING: 0
BLOOD IN STOOL: 0
CONSTIPATION: 0
DIARRHEA: 0
COUGH: 0
SHORTNESS OF BREATH: 1
COLOR CHANGE: 0
VOMITING: 0
ABDOMINAL PAIN: 0
SINUS PRESSURE: 0
NAUSEA: 0

## 2023-05-17 ENCOUNTER — TELEPHONE (OUTPATIENT)
Dept: CARDIOLOGY CLINIC | Age: 68
End: 2023-05-17

## 2023-05-17 NOTE — TELEPHONE ENCOUNTER
CARDIAC CLEARANCE     What type of procedure are you having?  Bilateral upper lid excision and reconstruction and right lower lid excision and reconstruction    Which physician is performing your procedure? Jagruti Dejesus MD    When is your procedure scheduled for?  5/19/2023    Where are you having this procedure?  [facility]    Are you taking Blood Thinners?   If so what? (Name/dose/frequesncy)  Eliquis 5mg. 1 PO BID     Does the surgeon want you to stop your blood thinner?  If so for how long?  Yes. 2 days prior and 2 days after.    Phone Number and Contact Name for Physicians office:  Maya GriderKofi  [phone]    Fax number to send information:  [phone]    History:  past medical history of smoker (1.5 PPD) COPD, HTN, and paroxysmal atrial fibrillation.     Cardiac clearance request has been scanned to PT's chart.

## 2023-05-17 NOTE — TELEPHONE ENCOUNTER
Dr. Reyes Gonzalez, please review and advise for a cardiac clearance. Thanks. Patient will be undergoing a bilateral upper lid and right lower lid excision and reconstruction on 5/19/23. Requesting to hold Eliquis 2 days prior and 2 days after procedure. He underwent a radiofrequency ablation of atrial fibrillation and pulmonary vein isolation of the left vein on 2/3/23. Seen in office 3/3/23 with you and 5/4/23 with [facility].

## 2023-10-30 NOTE — PROGRESS NOTES
02/23/2023. Chronic systolic heart failure  HFrEF - severely depressed LV systolic function - Recovered after ablation. Likely tachycardia mediated. - NYHA class III   - LVEF <20 % per Echo 3/15/2022, recovered at 55-65% per Echo 09/16/2022              - Depressed systolic function presumed to be rate related. - Continue guideline directed medical therapy. Beta Blocker: Metoprolol succinate 50 mg daily. Diuretic: Torsemide 20 mg daily,               ACE/ARB/ ARNI: Amlodipine 5 mg daily  - Euvolemic on today's exam. ***       Primary hypertension   - Not controlled. - Goal BP < 130/80  - continue medical management. Smoking addiction  - Smoking cessation discussed over 5 minutes. The harmful effects of smoking including cancer and CAD were extensively discussed. ***  Thank you for allowing me to participate in the care of Kaleb Ivey. All questions and concerns were addressed to the patient/family. Alternatives to my treatment were discussed.      ***    Vitaly Skaggs MD  Cardiac Electrophysiology  [facility]

## 2023-11-06 NOTE — PROGRESS NOTES
01/18/2023 showed recurrence of atrial fibrillation with increased burden. Mr. Elo Catherine comes back for follow-up at least 6 months since his ablation. He is continue to maintain sinus rhythm. We had considered stopping amiodarone but he was having frequent PACs. Current ECG shows no PAC. We will continue amiodarone for now as well as metoprolol succinate. We will watch her for toxicities of amiodarone as documented below. He reports feeling the best she has had in 2 years before A-fib/flutter and heart failure diagnosis. Continue anticoagulation for thromboembolic risk reduction    -routine checking of liver and thyroid function Q4-6 months, yearly PFTs and ophthalmology. -TSH 1.340 (12/21/2022)   -AST 13 ALT 11 (12/21/2022)    Check TSH and Hepatic panel. Referral to pulmonology for PFT. Encouraged to get yearly eye examination. Chronic systolic heart failure  HFrEF - severely depressed LV systolic function - Recovered after ablation. Likely tachycardia mediated. - NYHA class III   - LVEF <20 % per Echo 3/15/2022, recovered at 55-65% per Echo 09/16/2022              - Depressed systolic function presumed to be tachycardia related. Beta Blocker: Metoprolol succinate 50 mg daily. Diuretic: Torsemide 20 mg daily.  - Euvolemic on today's exam.        Primary hypertension   - Not controlled. - Goal BP < 130/80  - continue medical management. Smoking addiction  - Smoking cessation discussed over 5 minutes. The harmful effects of smoking including cancer and CAD were extensively discussed. - He has cut back to half pack a day. Follow ups: Follow up with Dr. Nicol Grnada in three months   Follow up in 6 months with EP NP. Thank you for allowing me to participate in the care of Wellington Glez. All questions and concerns were addressed to the patient/family. Alternatives to my treatment were discussed.      This note was scribed in the presence of Lincoln Orozco MD

## 2023-11-08 ENCOUNTER — OFFICE VISIT (OUTPATIENT)
Dept: CARDIOLOGY CLINIC | Age: 68
End: 2023-11-08
Payer: MEDICARE

## 2023-11-08 VITALS
DIASTOLIC BLOOD PRESSURE: 72 MMHG | HEIGHT: 68 IN | WEIGHT: 189 LBS | HEART RATE: 56 BPM | SYSTOLIC BLOOD PRESSURE: 142 MMHG | BODY MASS INDEX: 28.64 KG/M2 | OXYGEN SATURATION: 96 %

## 2023-11-08 DIAGNOSIS — I48.3 TYPICAL ATRIAL FLUTTER (HCC): ICD-10-CM

## 2023-11-08 DIAGNOSIS — I10 PRIMARY HYPERTENSION: ICD-10-CM

## 2023-11-08 DIAGNOSIS — I48.19 PERSISTENT ATRIAL FIBRILLATION (HCC): Primary | ICD-10-CM

## 2023-11-08 DIAGNOSIS — F17.200 SMOKING ADDICTION: ICD-10-CM

## 2023-11-08 DIAGNOSIS — I50.22 CHRONIC SYSTOLIC HEART FAILURE (HCC): ICD-10-CM

## 2023-11-08 DIAGNOSIS — Z79.899 ON AMIODARONE THERAPY: ICD-10-CM

## 2023-11-08 PROCEDURE — 4004F PT TOBACCO SCREEN RCVD TLK: CPT | Performed by: INTERNAL MEDICINE

## 2023-11-08 PROCEDURE — 3077F SYST BP >= 140 MM HG: CPT | Performed by: INTERNAL MEDICINE

## 2023-11-08 PROCEDURE — 3078F DIAST BP <80 MM HG: CPT | Performed by: INTERNAL MEDICINE

## 2023-11-08 PROCEDURE — G8417 CALC BMI ABV UP PARAM F/U: HCPCS | Performed by: INTERNAL MEDICINE

## 2023-11-08 PROCEDURE — G8427 DOCREV CUR MEDS BY ELIG CLIN: HCPCS | Performed by: INTERNAL MEDICINE

## 2023-11-08 PROCEDURE — 1123F ACP DISCUSS/DSCN MKR DOCD: CPT | Performed by: INTERNAL MEDICINE

## 2023-11-08 PROCEDURE — 3017F COLORECTAL CA SCREEN DOC REV: CPT | Performed by: INTERNAL MEDICINE

## 2023-11-08 PROCEDURE — G8484 FLU IMMUNIZE NO ADMIN: HCPCS | Performed by: INTERNAL MEDICINE

## 2023-11-08 PROCEDURE — 93000 ELECTROCARDIOGRAM COMPLETE: CPT | Performed by: INTERNAL MEDICINE

## 2023-11-08 PROCEDURE — 99214 OFFICE O/P EST MOD 30 MIN: CPT | Performed by: INTERNAL MEDICINE

## 2023-11-13 ENCOUNTER — OFFICE VISIT (OUTPATIENT)
Dept: PULMONOLOGY | Age: 68
End: 2023-11-13
Payer: MEDICARE

## 2023-11-13 VITALS
TEMPERATURE: 98 F | DIASTOLIC BLOOD PRESSURE: 72 MMHG | BODY MASS INDEX: 28.95 KG/M2 | WEIGHT: 191 LBS | OXYGEN SATURATION: 97 % | HEIGHT: 68 IN | SYSTOLIC BLOOD PRESSURE: 120 MMHG | HEART RATE: 52 BPM | RESPIRATION RATE: 18 BRPM

## 2023-11-13 DIAGNOSIS — Z72.0 TOBACCO ABUSE: ICD-10-CM

## 2023-11-13 DIAGNOSIS — E78.00 PURE HYPERCHOLESTEROLEMIA: ICD-10-CM

## 2023-11-13 DIAGNOSIS — J43.2 CENTRILOBULAR EMPHYSEMA (HCC): Primary | ICD-10-CM

## 2023-11-13 DIAGNOSIS — R91.1 LUNG NODULE: ICD-10-CM

## 2023-11-13 DIAGNOSIS — I48.19 PERSISTENT ATRIAL FIBRILLATION (HCC): ICD-10-CM

## 2023-11-13 DIAGNOSIS — Z12.5 SCREENING FOR PROSTATE CANCER: ICD-10-CM

## 2023-11-13 DIAGNOSIS — Z79.899 ON AMIODARONE THERAPY: ICD-10-CM

## 2023-11-13 LAB
ALBUMIN SERPL-MCNC: 4.3 G/DL (ref 3.4–5)
ALP SERPL-CCNC: 95 U/L (ref 40–129)
ALT SERPL-CCNC: 12 U/L (ref 10–40)
AST SERPL-CCNC: 15 U/L (ref 15–37)
BILIRUB DIRECT SERPL-MCNC: <0.2 MG/DL (ref 0–0.3)
BILIRUB INDIRECT SERPL-MCNC: NORMAL MG/DL (ref 0–1)
BILIRUB SERPL-MCNC: <0.2 MG/DL (ref 0–1)
PROT SERPL-MCNC: 6.8 G/DL (ref 6.4–8.2)
PSA SERPL DL<=0.01 NG/ML-MCNC: 1.09 NG/ML (ref 0–4)
TSH SERPL DL<=0.005 MIU/L-ACNC: 1.95 UIU/ML (ref 0.27–4.2)

## 2023-11-13 PROCEDURE — 3017F COLORECTAL CA SCREEN DOC REV: CPT | Performed by: INTERNAL MEDICINE

## 2023-11-13 PROCEDURE — 3023F SPIROM DOC REV: CPT | Performed by: INTERNAL MEDICINE

## 2023-11-13 PROCEDURE — G8427 DOCREV CUR MEDS BY ELIG CLIN: HCPCS | Performed by: INTERNAL MEDICINE

## 2023-11-13 PROCEDURE — 3074F SYST BP LT 130 MM HG: CPT | Performed by: INTERNAL MEDICINE

## 2023-11-13 PROCEDURE — 99214 OFFICE O/P EST MOD 30 MIN: CPT | Performed by: INTERNAL MEDICINE

## 2023-11-13 PROCEDURE — G8417 CALC BMI ABV UP PARAM F/U: HCPCS | Performed by: INTERNAL MEDICINE

## 2023-11-13 PROCEDURE — 4004F PT TOBACCO SCREEN RCVD TLK: CPT | Performed by: INTERNAL MEDICINE

## 2023-11-13 PROCEDURE — 3078F DIAST BP <80 MM HG: CPT | Performed by: INTERNAL MEDICINE

## 2023-11-13 PROCEDURE — 1123F ACP DISCUSS/DSCN MKR DOCD: CPT | Performed by: INTERNAL MEDICINE

## 2023-11-13 PROCEDURE — G8484 FLU IMMUNIZE NO ADMIN: HCPCS | Performed by: INTERNAL MEDICINE

## 2023-11-13 NOTE — PROGRESS NOTES
Pulmonary  Consult           REASON FOR CONSULTATION:  Chief Complaint   Patient presents with    New Patient    COPD        Consult at request of Magan Villatoro MD     PCP: Magan Villatoro MD        Assessment and Plan:   Diagnosis Orders   1. Centrilobular emphysema ([address])  Full PFT Study With Bronchodilator    MT NONINVASIVE EAR/PULSE OXIMETRY MULTIPLE DETER      2. Tobacco abuse        3.  Lung nodule            Plan:  Advised to quit smoking  PFT  Nocturnal pulse ox   I suspect he has significant degree of sleep apnea but he is not interested at this time           HISTORY OF PRESENT ILLNESS: [place] Stairs is very pleasant 80y.o. year old gentleman With medical history stated below significant for chronic active smoker with more than 30-pack-year history of smoking, referred to us for copd/emphysema, chronic systolic heart failure with EF of 20%, A fib required multiple interventions patient had ablation June 2022 and 2023, currently on amiodarone and anticoagulation  Patient with sob with exertion, no recent flare ups  He is on Trelegy and as needed Duoneb  He denied chest pain  He had CT screening which did not show any suspicious nodules, stable tiny pulmonary nodules        Past Medical History:   Diagnosis Date    Chronic pain syndrome     Chronic pain syndrome     COPD (chronic obstructive pulmonary disease) ([address]) 6/5/2012    DDD (degenerative disc disease), lumbar     Depression 6/5/2012    Diverticulitis 6/5/2012    DJD (degenerative joint disease) of knee     Foraminal stenosis of lumbar region     LORIE (generalized anxiety disorder) 6/5/2012    GERD (gastroesophageal reflux disease) 6/5/2012    HTN (hypertension) 6/5/2012    Incidental lung nodule     Insomnia     Lumbar radiculitis     Neuropathy 6/5/2012    Nontraumatic rupture of tendons of biceps (long head) 6/5/2012    Numbness and

## 2023-11-14 ENCOUNTER — TELEPHONE (OUTPATIENT)
Dept: PULMONOLOGY | Age: 68
End: 2023-11-14

## 2023-11-14 NOTE — TELEPHONE ENCOUNTER
Jenifer Osorio with [employer] called in regarding the oxygen order. He says it needs to be corrected. If it's an order for a nocturnal pulse ox, a sleep study needs to be faxed in. Frequency of use needs to state nocturnal, LPM need to be stated. Fax the updated order to [phone]  Call him at [phone] if needed.

## 2023-11-14 NOTE — TELEPHONE ENCOUNTER
Called our Rep for [employer] in regards to below he is going to pull the order and check into it. He said they should not need anything else for overnight oximetry. He will let us know if anything further is needed.

## 2023-12-06 ENCOUNTER — HOSPITAL ENCOUNTER (OUTPATIENT)
Dept: PULMONOLOGY | Age: 68
Discharge: HOME | End: 2023-12-06
Payer: MEDICARE

## 2023-12-06 LAB
DLCO %PRED: 45 %
DLCO PRED: NORMAL
DLCO/VA %PRED: NORMAL
DLCO/VA PRED: NORMAL
DLCO/VA: NORMAL
DLCO: NORMAL
EXPIRATORY TIME-POST: NORMAL
EXPIRATORY TIME: NORMAL
FEF 25-75% %CHNG: NORMAL
FEF 25-75% %PRED-POST: NORMAL
FEF 25-75% %PRED-PRE: NORMAL
FEF 25-75% PRED: NORMAL
FEF 25-75%-POST: NORMAL
FEF 25-75%-PRE: NORMAL
FEV1 %PRED-POST: 62 %
FEV1 %PRED-PRE: 58 %
FEV1 PRED: NORMAL
FEV1-POST: NORMAL
FEV1-PRE: NORMAL
FEV1/FVC %PRED-POST: NORMAL
FEV1/FVC %PRED-PRE: NORMAL
FEV1/FVC PRED: NORMAL
FEV1/FVC-POST: NORMAL
FEV1/FVC-PRE: NORMAL
FVC %PRED-POST: 78 L
FVC %PRED-PRE: 74 %
FVC PRED: NORMAL
FVC-POST: NORMAL
FVC-PRE: NORMAL
GAW %PRED: NORMAL
GAW PRED: NORMAL
GAW: NORMAL
IC %PRED: NORMAL
IC PRED: NORMAL
IC: NORMAL
MEP: NORMAL
MIP: NORMAL
MVV %PRED-PRE: NORMAL
MVV PRED: NORMAL
MVV-PRE: NORMAL
PEF %PRED-POST: NORMAL
PEF %PRED-PRE: NORMAL
PEF PRED: NORMAL
PEF%CHNG: NORMAL
PEF-POST: NORMAL
PEF-PRE: NORMAL
RAW %PRED: NORMAL
RAW PRED: NORMAL
RAW: NORMAL
RV %PRED: NORMAL
RV PRED: NORMAL
RV: NORMAL
SVC %PRED: NORMAL
SVC PRED: NORMAL
SVC: NORMAL
TLC %PRED: 79 %
TLC PRED: NORMAL
TLC: NORMAL
VA %PRED: NORMAL
VA PRED: NORMAL
VA: NORMAL
VTG %PRED: NORMAL
VTG PRED: NORMAL
VTG: NORMAL

## 2023-12-06 PROCEDURE — 94729 DIFFUSING CAPACITY: CPT

## 2023-12-06 PROCEDURE — 6370000000 HC RX 637 (ALT 250 FOR IP): Performed by: INTERNAL MEDICINE

## 2023-12-06 PROCEDURE — 94640 AIRWAY INHALATION TREATMENT: CPT

## 2023-12-06 PROCEDURE — 94060 EVALUATION OF WHEEZING: CPT

## 2023-12-06 PROCEDURE — 94727 GAS DIL/WSHOT DETER LNG VOL: CPT

## 2023-12-06 RX ORDER — ALBUTEROL SULFATE 90 UG/1
4 AEROSOL, METERED RESPIRATORY (INHALATION) ONCE
Status: COMPLETED | OUTPATIENT
Start: 2023-12-06 | End: 2023-12-06

## 2023-12-06 RX ADMIN — ALBUTEROL SULFATE 4 PUFF: 90 AEROSOL, METERED RESPIRATORY (INHALATION) at 10:26

## 2023-12-06 ASSESSMENT — PULMONARY FUNCTION TESTS
FEV1_PERCENT_PREDICTED_POST: 62
FEV1_PERCENT_PREDICTED_PRE: 58
FVC_PERCENT_PREDICTED_PRE: 74
FVC_PERCENT_PREDICTED_POST: 78

## 2023-12-08 NOTE — PROCEDURES
Spirometry was acceptable and reproducible by ATS standards      Spirometry/Flow volume loop:  Spirometry and flow volume loops consistent with  airflow obstruction. FEV1 of 62%, and FVC 78%. There was not a significant bronchodilator response. Lung volumes:  Mild reduction in total lung capacity consistent with restrictive lung disease. Diffusing capacity:  Diffusion capacity reduced, not corrected for hemoglobin. Summary:  Combined obstructive and restrictive pulmonary function testing. FEV1 %Pred-Post   Date Value Ref Range Status   12/06/2023 62 % Final     TLC %Pred   Date Value Ref Range Status   12/06/2023 79 % Final     DLCO %Pred   Date Value Ref Range Status   12/06/2023 45 % Final       PFT data will be scanned into the media tab under this encounter. Please see the scanned data for numerical values.      Artem Zuleta MD  [facility]

## 2024-01-30 PROBLEM — I50.23 ACUTE ON CHRONIC SYSTOLIC HF (HEART FAILURE) (HCC): Status: ACTIVE | Noted: 2022-06-22

## 2024-02-13 NOTE — PROGRESS NOTES
-abdominal aorta imaging to r/o AAA    6) COPD  -Encouraged smoking cessation     7) Polycythemia, macrocytosis, mild thrombocytopenia,  -Managed per Dr. Mullins     Follow up 1 year    Thank you very much for allowing me to participate in the care of your patient. Please do not hesitate to contact me if you have any questions.      Virgil Fields MD [facility]  General, Interventional Cardiology, and Peripheral Vascular Disease   [facility]   Ph: [phone]  Fax: [phone]      This note was scribed in the presence of Dr. Virgil Fields MD by Cinda Jay, RN    Physician Attestation:  The scribes documentation has been prepared under my direction and personally reviewed by me in its entirety.     I confirm the note above accurately reflects all work, treatment, procedures, and medical decision making performed by me.    Electronically signed by Virgil Fields MD on 2/19/2024 at 9:22 AM

## 2024-02-19 ENCOUNTER — OFFICE VISIT (OUTPATIENT)
Dept: CARDIOLOGY CLINIC | Age: 69
End: 2024-02-19
Payer: MEDICARE

## 2024-02-19 VITALS
WEIGHT: 193.4 LBS | HEIGHT: 68 IN | SYSTOLIC BLOOD PRESSURE: 120 MMHG | DIASTOLIC BLOOD PRESSURE: 60 MMHG | BODY MASS INDEX: 29.31 KG/M2 | HEART RATE: 80 BPM | OXYGEN SATURATION: 96 %

## 2024-02-19 DIAGNOSIS — I48.91 ATRIAL FIBRILLATION WITH RVR (HCC): Primary | ICD-10-CM

## 2024-02-19 DIAGNOSIS — I10 UNCONTROLLED HYPERTENSION: ICD-10-CM

## 2024-02-19 PROCEDURE — G8417 CALC BMI ABV UP PARAM F/U: HCPCS | Performed by: INTERNAL MEDICINE

## 2024-02-19 PROCEDURE — 1123F ACP DISCUSS/DSCN MKR DOCD: CPT | Performed by: INTERNAL MEDICINE

## 2024-02-19 PROCEDURE — 4004F PT TOBACCO SCREEN RCVD TLK: CPT | Performed by: INTERNAL MEDICINE

## 2024-02-19 PROCEDURE — G8484 FLU IMMUNIZE NO ADMIN: HCPCS | Performed by: INTERNAL MEDICINE

## 2024-02-19 PROCEDURE — 99213 OFFICE O/P EST LOW 20 MIN: CPT | Performed by: INTERNAL MEDICINE

## 2024-02-19 PROCEDURE — G8427 DOCREV CUR MEDS BY ELIG CLIN: HCPCS | Performed by: INTERNAL MEDICINE

## 2024-02-19 PROCEDURE — 3078F DIAST BP <80 MM HG: CPT | Performed by: INTERNAL MEDICINE

## 2024-02-19 PROCEDURE — 3017F COLORECTAL CA SCREEN DOC REV: CPT | Performed by: INTERNAL MEDICINE

## 2024-02-19 PROCEDURE — 3074F SYST BP LT 130 MM HG: CPT | Performed by: INTERNAL MEDICINE

## 2024-02-19 RX ORDER — FLUTICASONE FUROATE, UMECLIDINIUM BROMIDE AND VILANTEROL TRIFENATATE 100; 62.5; 25 UG/1; UG/1; UG/1
1 POWDER RESPIRATORY (INHALATION) DAILY
COMMUNITY

## 2024-03-10 ENCOUNTER — TELEPHONE (OUTPATIENT)
Dept: CASE MANAGEMENT | Age: 69
End: 2024-03-10

## 2024-05-23 NOTE — TELEPHONE ENCOUNTER
Last O/V:  24   Next O/V:  None ( due yearly)   Last Refill: 10/26/23  Last Labs:  Tsh with reflex to FT4, Hepatic panel:23 - CMP: 10/31/23   Last EK23     Looks like amiodarone has been prescribed by PCP

## 2024-05-24 RX ORDER — AMIODARONE HYDROCHLORIDE 200 MG/1
200 TABLET ORAL DAILY
Qty: 90 TABLET | Refills: 3 | Status: SHIPPED | OUTPATIENT
Start: 2024-05-24

## 2024-06-10 ENCOUNTER — TELEPHONE (OUTPATIENT)
Dept: CASE MANAGEMENT | Age: 69
End: 2024-06-10

## 2024-06-10 NOTE — TELEPHONE ENCOUNTER
Lung Cancer Screening Radiology Recommendation reminder letter mailed to patient, this is patients 3rd & final reminder letter. Patients second reminder letter sent via patients My Chart.  Patients ordering provider, Dr. Mullins, notified via EMR direct message, verification received.   Smoking history reviewed.

## 2025-01-29 PROBLEM — N18.30 CHRONIC RENAL DISEASE, STAGE III (HCC): Status: RESOLVED | Noted: 2022-06-22 | Resolved: 2025-01-29

## 2025-01-29 PROBLEM — I50.23 ACUTE ON CHRONIC SYSTOLIC HF (HEART FAILURE) (HCC): Status: RESOLVED | Noted: 2022-06-22 | Resolved: 2025-01-29

## 2025-03-12 ENCOUNTER — OFFICE VISIT (OUTPATIENT)
Dept: CARDIOLOGY CLINIC | Age: 70
End: 2025-03-12
Payer: MEDICARE

## 2025-03-12 ENCOUNTER — RESULTS FOLLOW-UP (OUTPATIENT)
Dept: CARDIOLOGY CLINIC | Age: 70
End: 2025-03-12

## 2025-03-12 VITALS
BODY MASS INDEX: 28.79 KG/M2 | OXYGEN SATURATION: 100 % | HEIGHT: 68 IN | WEIGHT: 190 LBS | DIASTOLIC BLOOD PRESSURE: 80 MMHG | HEART RATE: 59 BPM | SYSTOLIC BLOOD PRESSURE: 162 MMHG

## 2025-03-12 DIAGNOSIS — I73.9 CLAUDICATION: ICD-10-CM

## 2025-03-12 DIAGNOSIS — I10 PRIMARY HYPERTENSION: ICD-10-CM

## 2025-03-12 DIAGNOSIS — F17.200 SMOKING ADDICTION: ICD-10-CM

## 2025-03-12 DIAGNOSIS — I48.19 PERSISTENT ATRIAL FIBRILLATION (HCC): Primary | ICD-10-CM

## 2025-03-12 PROCEDURE — 4004F PT TOBACCO SCREEN RCVD TLK: CPT | Performed by: INTERNAL MEDICINE

## 2025-03-12 PROCEDURE — 1123F ACP DISCUSS/DSCN MKR DOCD: CPT | Performed by: INTERNAL MEDICINE

## 2025-03-12 PROCEDURE — 1159F MED LIST DOCD IN RCRD: CPT | Performed by: INTERNAL MEDICINE

## 2025-03-12 PROCEDURE — 93000 ELECTROCARDIOGRAM COMPLETE: CPT | Performed by: INTERNAL MEDICINE

## 2025-03-12 PROCEDURE — G8427 DOCREV CUR MEDS BY ELIG CLIN: HCPCS | Performed by: INTERNAL MEDICINE

## 2025-03-12 PROCEDURE — 3017F COLORECTAL CA SCREEN DOC REV: CPT | Performed by: INTERNAL MEDICINE

## 2025-03-12 PROCEDURE — 3079F DIAST BP 80-89 MM HG: CPT | Performed by: INTERNAL MEDICINE

## 2025-03-12 PROCEDURE — 99214 OFFICE O/P EST MOD 30 MIN: CPT | Performed by: INTERNAL MEDICINE

## 2025-03-12 PROCEDURE — 3077F SYST BP >= 140 MM HG: CPT | Performed by: INTERNAL MEDICINE

## 2025-03-12 PROCEDURE — G2211 COMPLEX E/M VISIT ADD ON: HCPCS | Performed by: INTERNAL MEDICINE

## 2025-03-12 PROCEDURE — G8417 CALC BMI ABV UP PARAM F/U: HCPCS | Performed by: INTERNAL MEDICINE

## 2025-03-12 NOTE — PROGRESS NOTES
Cardiology Consultation     David Avila  1955    PCP: Juliana Boss MD  Referring Physician: [facility]  Reason for Referral: coronary calcification CT scan 12/23/21  Chief Complaint: \"I am good\"  Chief Complaint   Patient presents with    Follow-up    Other     Pain in legs while walking    Edema     Right hand       Subjective:     History of Present Illness: The patient is 69 y.o. male with a past medical history significant for GERD, DDD, COPD, polycythemia, macrocytosis, mild thrombocytopenia, paroxsymal atrial fibrillation, HTN, HLD, smoking addiction, cardiac workup with abnormal stress test, normal echo and LHC 2015 revealing  normal coronary arteries, normal left ventricular systolic function with an estimated EF of 50% to 55%. Last echo 2018 LVEF 55-60%. Patient here today for routine follow up. Reports overall he is doing good. Reports new claudication in his lower extremities. Symptoms worse with walking. Denies any wounds. Patient currently denies any weight gain, LE edema, palpitations, chest pain, shortness of breath, PND, dizziness, and syncope. Endorses some mild edema of his right hand right when he wakes up. Resolves as the day goes on.       Past Medical History:   Diagnosis Date    Chronic pain syndrome     Chronic pain syndrome     COPD (chronic obstructive pulmonary disease) (HCC) 6/5/2012    DDD (degenerative disc disease), lumbar     Depression 6/5/2012    Diverticulitis 6/5/2012    DJD (degenerative joint disease) of knee     Foraminal stenosis of lumbar region     LORIE (generalized anxiety disorder) 6/5/2012    GERD (gastroesophageal reflux disease) 6/5/2012    HTN (hypertension) 6/5/2012    Incidental lung nodule     Insomnia     Lumbar radiculitis     Neuropathy 6/5/2012    Nontraumatic rupture of tendons of biceps (long head) 6/5/2012    Numbness and tingling of both legs     Pernicious anemia 6/5/2012    Sciatica     Sprain and strain of unspecified site

## 2025-03-28 ENCOUNTER — HOSPITAL ENCOUNTER (OUTPATIENT)
Dept: VASCULAR LAB | Age: 70
Discharge: HOME | End: 2025-03-30
Attending: INTERNAL MEDICINE
Payer: MEDICARE

## 2025-03-28 DIAGNOSIS — I73.9 CLAUDICATION: ICD-10-CM

## 2025-03-28 LAB
VAS LEFT ABI: 0.96
VAS LEFT ARM BP: 148 MMHG
VAS LEFT ATA MID PSV: 73.9 CM/S
VAS LEFT CFA DIST PSV: 112.5 CM/S
VAS LEFT CFA PROX PSV: 119.1 CM/S
VAS LEFT DORSALIS PEDIS BP: 120 MMHG
VAS LEFT PERONEAL MID PSV: 50.3 CM/S
VAS LEFT PFA PROX PSV: 132 CM/S
VAS LEFT POP A DIST PSV: 81.4 CM/S
VAS LEFT POP A PROX PSV: 72.8 CM/S
VAS LEFT POP A PROX VEL RATIO: 0.33
VAS LEFT PTA BP: 142 MMHG
VAS LEFT PTA MID PSV: 66.6 CM/S
VAS LEFT SFA DIST PSV: 223.9 CM/S
VAS LEFT SFA DIST VEL RATIO: 1.4
VAS LEFT SFA MID PSV: 160.5 CM/S
VAS LEFT SFA MID VEL RATIO: 1.35
VAS LEFT SFA PROX PSV: 119 CM/S
VAS LEFT SFA PROX VEL RATIO: 1
VAS RIGHT ABI: 0.92
VAS RIGHT ARM BP: 148 MMHG
VAS RIGHT ATA MID PSV: 63.9 CM/S
VAS RIGHT CFA DIST PSV: 141 CM/S
VAS RIGHT CFA PROX PSV: 145.4 CM/S
VAS RIGHT DORSALIS PEDIS BP: 120 MMHG
VAS RIGHT PERONEAL MID PSV: 47.3 CM/S
VAS RIGHT PFA PROX PSV: 158 CM/S
VAS RIGHT POP A DIST PSV: 74 CM/S
VAS RIGHT POP A PROX PSV: 53.1 CM/S
VAS RIGHT POP A PROX VEL RATIO: 0.68
VAS RIGHT PTA BP: 136 MMHG
VAS RIGHT PTA MID PSV: 56.8 CM/S
VAS RIGHT SFA DIST PSV: 77.7 CM/S
VAS RIGHT SFA DIST VEL RATIO: 0.28
VAS RIGHT SFA MID PSV: 279.5 CM/S
VAS RIGHT SFA MID VEL RATIO: 2.8
VAS RIGHT SFA PROX PSV: 101.4 CM/S
VAS RIGHT SFA PROX VEL RATIO: 0.7

## 2025-03-28 PROCEDURE — 93925 LOWER EXTREMITY STUDY: CPT

## 2025-03-31 PROCEDURE — 93925 LOWER EXTREMITY STUDY: CPT | Performed by: SURGERY

## 2025-04-01 ENCOUNTER — RESULTS FOLLOW-UP (OUTPATIENT)
Dept: CARDIOLOGY CLINIC | Age: 70
End: 2025-04-01

## 2025-04-01 NOTE — RESULT ENCOUNTER NOTE
Please notify patient that their arterial circulation shows a 50-70% blockade in both thighs. Recommend medical therapy. Pletal 50mg po q12h and follow up in 6 months

## 2025-04-03 ENCOUNTER — TELEPHONE (OUTPATIENT)
Dept: CARDIOLOGY CLINIC | Age: 70
End: 2025-04-03

## 2025-04-03 RX ORDER — CILOSTAZOL 50 MG/1
50 TABLET ORAL 2 TIMES DAILY
Qty: 60 TABLET | Refills: 3 | Status: SHIPPED | OUTPATIENT
Start: 2025-04-03

## 2025-04-03 NOTE — TELEPHONE ENCOUNTER
Please call patient to scheduled 6 month follow up with Dr. Fields.   Patient requested that we call at a later time today to schedule.   Thanks

## 2025-07-29 RX ORDER — CILOSTAZOL 50 MG/1
50 TABLET ORAL 2 TIMES DAILY
Qty: 180 TABLET | Refills: 0 | Status: SHIPPED | OUTPATIENT
Start: 2025-07-29

## 2025-07-29 NOTE — TELEPHONE ENCOUNTER
Last Office Visit: 3/12/2025 Provider: HI  **Is provider OOT? No    Next Office Visit: 9/19/2025 Provider: HI    LAST LABS:   CBC:  Lab Results   Component Value Date    WBC 8.8 01/29/2025    HGB 13.6 01/29/2025    HCT 43.4 01/29/2025    MCV 89 01/29/2025     01/29/2025    LYMPHOPCT 17 01/29/2025    RBC 4.87 01/29/2025    MCH 27.9 01/29/2025    MCHC 31.3 (L) 01/29/2025    RDW 15.2 01/29/2025           CMP:  Lab Results   Component Value Date     01/29/2025    K 5.4 (H) 01/29/2025     01/29/2025    CO2 21 01/29/2025    BUN 19 01/29/2025    CREATININE 1.94 (H) 01/29/2025    GLUCOSE 64 (L) 01/29/2025    CALCIUM 9.1 01/29/2025    BILITOT <0.2 01/29/2025    ALKPHOS 96 01/29/2025    AST 14 01/29/2025    ALT 12 01/29/2025    LABGLOM >60 02/03/2023    GFRAA 38 (A) 06/15/2022    AGRATIO 1.7 12/21/2022    GLOB 2.8 01/29/2025

## 2025-08-06 ENCOUNTER — HOSPITAL ENCOUNTER (OUTPATIENT)
Dept: GENERAL RADIOLOGY | Age: 70
Discharge: HOME | End: 2025-08-06
Payer: MEDICARE

## 2025-08-06 ENCOUNTER — HOSPITAL ENCOUNTER (OUTPATIENT)
Age: 70
Discharge: HOME | End: 2025-08-06
Payer: MEDICARE

## 2025-08-06 DIAGNOSIS — R07.89 CHEST WALL PAIN: ICD-10-CM

## 2025-08-06 PROCEDURE — 72070 X-RAY EXAM THORAC SPINE 2VWS: CPT

## 2025-08-06 PROCEDURE — 71101 X-RAY EXAM UNILAT RIBS/CHEST: CPT
